# Patient Record
Sex: MALE | Race: WHITE | NOT HISPANIC OR LATINO | Employment: OTHER | URBAN - METROPOLITAN AREA
[De-identification: names, ages, dates, MRNs, and addresses within clinical notes are randomized per-mention and may not be internally consistent; named-entity substitution may affect disease eponyms.]

---

## 2017-09-07 ENCOUNTER — OFFICE VISIT (OUTPATIENT)
Dept: FAMILY MEDICINE CLINIC | Facility: CLINIC | Age: 67
End: 2017-09-07

## 2017-09-07 VITALS
SYSTOLIC BLOOD PRESSURE: 138 MMHG | HEIGHT: 69 IN | OXYGEN SATURATION: 98 % | RESPIRATION RATE: 14 BRPM | TEMPERATURE: 98.3 F | DIASTOLIC BLOOD PRESSURE: 88 MMHG | WEIGHT: 185.2 LBS | BODY MASS INDEX: 27.43 KG/M2 | HEART RATE: 79 BPM

## 2017-09-07 DIAGNOSIS — E78.5 HYPERLIPIDEMIA, UNSPECIFIED HYPERLIPIDEMIA TYPE: ICD-10-CM

## 2017-09-07 DIAGNOSIS — N40.1 BENIGN NON-NODULAR PROSTATIC HYPERPLASIA WITH LOWER URINARY TRACT SYMPTOMS: ICD-10-CM

## 2017-09-07 DIAGNOSIS — I10 ESSENTIAL HYPERTENSION: Primary | ICD-10-CM

## 2017-09-07 LAB
ALBUMIN SERPL-MCNC: 4.6 G/DL (ref 3.2–4.8)
ALBUMIN/GLOB SERPL: 1.6 G/DL (ref 1.5–2.5)
ALP SERPL-CCNC: 93 U/L (ref 25–100)
ALT SERPL-CCNC: 20 U/L (ref 7–40)
AST SERPL-CCNC: 20 U/L (ref 0–33)
BILIRUB SERPL-MCNC: 0.8 MG/DL (ref 0.3–1.2)
BUN SERPL-MCNC: 18 MG/DL (ref 9–23)
BUN/CREAT SERPL: 18 (ref 7–25)
CALCIUM SERPL-MCNC: 10.1 MG/DL (ref 8.7–10.4)
CHLORIDE SERPL-SCNC: 100 MMOL/L (ref 99–109)
CHOLEST SERPL-MCNC: 167 MG/DL (ref 0–200)
CO2 SERPL-SCNC: 29 MMOL/L (ref 20–31)
CREAT SERPL-MCNC: 1 MG/DL (ref 0.6–1.3)
GLOBULIN SER CALC-MCNC: 2.9 GM/DL
GLUCOSE SERPL-MCNC: 102 MG/DL (ref 70–100)
HDLC SERPL-MCNC: 40 MG/DL (ref 40–60)
LDLC SERPL CALC-MCNC: 83 MG/DL (ref 0–100)
POTASSIUM SERPL-SCNC: 4 MMOL/L (ref 3.5–5.5)
PROT SERPL-MCNC: 7.5 G/DL (ref 5.7–8.2)
PSA SERPL-MCNC: 0.84 NG/ML (ref 0–4)
SODIUM SERPL-SCNC: 137 MMOL/L (ref 132–146)
TRIGL SERPL-MCNC: 220 MG/DL (ref 0–150)
VLDLC SERPL CALC-MCNC: 44 MG/DL

## 2017-09-07 PROCEDURE — 99213 OFFICE O/P EST LOW 20 MIN: CPT | Performed by: PHYSICIAN ASSISTANT

## 2017-09-07 RX ORDER — QUINAPRIL 40 MG/1
40 TABLET ORAL DAILY
Qty: 30 TABLET | Refills: 11 | Status: SHIPPED | OUTPATIENT
Start: 2017-09-07 | End: 2017-10-05 | Stop reason: SDUPTHER

## 2017-09-07 RX ORDER — SIMVASTATIN 40 MG
40 TABLET ORAL NIGHTLY
Qty: 30 TABLET | Refills: 11 | Status: SHIPPED | OUTPATIENT
Start: 2017-09-07 | End: 2018-08-20 | Stop reason: SDUPTHER

## 2017-09-07 RX ORDER — HYDROCHLOROTHIAZIDE 25 MG/1
25 TABLET ORAL DAILY
Qty: 30 TABLET | Refills: 11 | Status: SHIPPED | OUTPATIENT
Start: 2017-09-07 | End: 2018-09-17 | Stop reason: SDUPTHER

## 2017-09-07 RX ORDER — ASPIRIN 81 MG/1
81 TABLET ORAL DAILY
COMMUNITY
End: 2019-10-07

## 2017-09-07 RX ORDER — FINASTERIDE 5 MG/1
5 TABLET, FILM COATED ORAL DAILY
Qty: 30 TABLET | Refills: 11 | Status: SHIPPED | OUTPATIENT
Start: 2017-09-07 | End: 2018-09-17 | Stop reason: SDUPTHER

## 2017-09-07 NOTE — PROGRESS NOTES
Subjective   Rhett sIaacs is a 67 y.o. male    History of Present Illness  Patient 67-year-old white male comes in follow-up hypertension doing well no problems or complaints, taking medication as prescribed blood pressure running 140/80.    Patient comes in follow-up of BPH with obstruction symptoms takes Proscar everyday symptoms are controlled medication no dysuria and increased frequency no burning upon urination    Patient comes in follow-up hyperlipidemia takes simvastatin everyday has been trying to watch his diet and exercise no problem or complaints on medication      The following portions of the patient's history were reviewed and updated as appropriate: allergies, current medications, past social history and problem list    Review of Systems   Constitutional: Negative for appetite change, diaphoresis, fatigue and unexpected weight change.   Eyes: Negative for visual disturbance.   Respiratory: Negative for cough, chest tightness and shortness of breath.    Cardiovascular: Negative for chest pain, palpitations and leg swelling.   Gastrointestinal: Negative for diarrhea, nausea and vomiting.   Endocrine: Negative for polydipsia, polyphagia and polyuria.   Skin: Negative for color change and rash.   Neurological: Negative for dizziness, syncope, weakness, light-headedness, numbness and headaches.       Objective     Vitals:    09/07/17 0836   BP: 138/88   Pulse: 79   Resp: 14   Temp: 98.3 °F (36.8 °C)   SpO2: 98%       Physical Exam   Constitutional: He appears well-developed and well-nourished.   Neck: Neck supple. No JVD present. No thyromegaly present.   Cardiovascular: Normal rate, regular rhythm, normal heart sounds, intact distal pulses and normal pulses.    No murmur heard.  Pulmonary/Chest: Effort normal and breath sounds normal. No respiratory distress.   Abdominal: Soft. Bowel sounds are normal. There is no hepatosplenomegaly. There is no tenderness.   Musculoskeletal: He exhibits no edema.    Lymphadenopathy:     He has no cervical adenopathy.   Neurological: No sensory deficit.   Skin: Skin is warm and dry. He is not diaphoretic.   Nursing note and vitals reviewed.      Assessment/Plan     Diagnoses and all orders for this visit:    Essential hypertension  -     quinapril (ACCUPRIL) 40 MG tablet; Take 1 tablet by mouth Daily.  -     hydrochlorothiazide (HYDRODIURIL) 25 MG tablet; Take 1 tablet by mouth Daily.  -     Comprehensive Metabolic Panel    Hyperlipidemia, unspecified hyperlipidemia type  -     simvastatin (ZOCOR) 40 MG tablet; Take 1 tablet by mouth Every Night.  -     Lipid Panel    Benign non-nodular prostatic hyperplasia with lower urinary tract symptoms  -     finasteride (PROSCAR) 5 MG tablet; Take 1 tablet by mouth Daily.  -     PSA; Future    Other orders  -     aspirin 81 MG EC tablet; Take 81 mg by mouth Daily.

## 2017-09-12 ENCOUNTER — RESULTS ENCOUNTER (OUTPATIENT)
Dept: FAMILY MEDICINE CLINIC | Facility: CLINIC | Age: 67
End: 2017-09-12

## 2017-09-12 DIAGNOSIS — N40.1 BENIGN NON-NODULAR PROSTATIC HYPERPLASIA WITH LOWER URINARY TRACT SYMPTOMS: ICD-10-CM

## 2017-09-19 DIAGNOSIS — I10 ESSENTIAL HYPERTENSION: ICD-10-CM

## 2017-09-19 RX ORDER — METOPROLOL SUCCINATE 100 MG/1
TABLET, EXTENDED RELEASE ORAL
Qty: 30 TABLET | Refills: 11 | Status: CANCELLED | OUTPATIENT
Start: 2017-09-19

## 2017-09-21 ENCOUNTER — TELEPHONE (OUTPATIENT)
Dept: FAMILY MEDICINE CLINIC | Facility: CLINIC | Age: 67
End: 2017-09-21

## 2017-09-21 DIAGNOSIS — I10 ESSENTIAL HYPERTENSION: ICD-10-CM

## 2017-09-21 RX ORDER — METOPROLOL SUCCINATE 100 MG/1
100 TABLET, EXTENDED RELEASE ORAL DAILY
Qty: 30 TABLET | Refills: 11 | Status: SHIPPED | OUTPATIENT
Start: 2017-09-21 | End: 2018-09-17 | Stop reason: SDUPTHER

## 2017-09-21 NOTE — TELEPHONE ENCOUNTER
----- Message from Prerna Ny sent at 9/21/2017  9:22 AM EDT -----  Contact: PATIENT  MEDICATION WAS NOT CALLED IN WITH OTHERS LAST WEEK:    metoprolol succinate XL (TOPROL-XL) 100 MG 24 hr tablet 1 9/13/2016           Essential hypertension  - Primary        Pharmacy     [facility]/PHARMACY #[id number] - [address] [phone] [facility] [phone]

## 2017-09-22 DIAGNOSIS — N41.9 PROSTATITIS, UNSPECIFIED PROSTATITIS TYPE: ICD-10-CM

## 2017-09-22 DIAGNOSIS — I10 ESSENTIAL HYPERTENSION: ICD-10-CM

## 2017-09-22 RX ORDER — QUINAPRIL 40 MG/1
TABLET ORAL
Qty: 30 TABLET | Refills: 11 | Status: SHIPPED | OUTPATIENT
Start: 2017-09-22 | End: 2018-09-17 | Stop reason: SDUPTHER

## 2017-09-22 RX ORDER — FINASTERIDE 5 MG/1
TABLET, FILM COATED ORAL
Qty: 30 TABLET | Refills: 10 | Status: SHIPPED | OUTPATIENT
Start: 2017-09-22 | End: 2017-10-05 | Stop reason: SDUPTHER

## 2017-09-22 RX ORDER — HYDROCHLOROTHIAZIDE 25 MG/1
TABLET ORAL
Qty: 30 TABLET | Refills: 10 | Status: SHIPPED | OUTPATIENT
Start: 2017-09-22 | End: 2017-10-05 | Stop reason: SDUPTHER

## 2017-10-05 ENCOUNTER — OFFICE VISIT (OUTPATIENT)
Dept: FAMILY MEDICINE CLINIC | Facility: CLINIC | Age: 67
End: 2017-10-05

## 2017-10-05 ENCOUNTER — LAB REQUISITION (OUTPATIENT)
Dept: LAB | Facility: HOSPITAL | Age: 67
End: 2017-10-05

## 2017-10-05 VITALS
SYSTOLIC BLOOD PRESSURE: 130 MMHG | TEMPERATURE: 97.8 F | HEIGHT: 69 IN | BODY MASS INDEX: 27.34 KG/M2 | RESPIRATION RATE: 16 BRPM | DIASTOLIC BLOOD PRESSURE: 78 MMHG | OXYGEN SATURATION: 98 % | WEIGHT: 184.6 LBS | HEART RATE: 73 BPM

## 2017-10-05 DIAGNOSIS — Z00.00 MEDICARE ANNUAL WELLNESS VISIT, INITIAL: ICD-10-CM

## 2017-10-05 DIAGNOSIS — Z00.00 ROUTINE GENERAL MEDICAL EXAMINATION AT HEALTH CARE FACILITY: Primary | ICD-10-CM

## 2017-10-05 DIAGNOSIS — Z12.11 SCREEN FOR COLON CANCER: ICD-10-CM

## 2017-10-05 DIAGNOSIS — Z23 IMMUNIZATION DUE: ICD-10-CM

## 2017-10-05 DIAGNOSIS — Z00.00 ROUTINE GENERAL MEDICAL EXAMINATION AT A HEALTH CARE FACILITY: ICD-10-CM

## 2017-10-05 LAB
BILIRUB BLD-MCNC: NEGATIVE MG/DL
CLARITY, POC: CLEAR
COLOR UR: YELLOW
GLUCOSE UR STRIP-MCNC: NEGATIVE MG/DL
KETONES UR QL: NEGATIVE
LEUKOCYTE EST, POC: NEGATIVE
NITRITE UR-MCNC: NEGATIVE MG/ML
PH UR: 8 [PH] (ref 5–8)
PROT UR STRIP-MCNC: NEGATIVE MG/DL
RBC # UR STRIP: ABNORMAL /UL
SP GR UR: 1.01 (ref 1–1.03)
UROBILINOGEN UR QL: NORMAL

## 2017-10-05 PROCEDURE — 36415 COLL VENOUS BLD VENIPUNCTURE: CPT | Performed by: PHYSICIAN ASSISTANT

## 2017-10-05 PROCEDURE — 99397 PER PM REEVAL EST PAT 65+ YR: CPT | Performed by: PHYSICIAN ASSISTANT

## 2017-10-05 PROCEDURE — 93000 ELECTROCARDIOGRAM COMPLETE: CPT | Performed by: PHYSICIAN ASSISTANT

## 2017-10-05 PROCEDURE — 96160 PT-FOCUSED HLTH RISK ASSMT: CPT | Performed by: PHYSICIAN ASSISTANT

## 2017-10-05 PROCEDURE — G0439 PPPS, SUBSEQ VISIT: HCPCS | Performed by: PHYSICIAN ASSISTANT

## 2017-10-05 PROCEDURE — 81003 URINALYSIS AUTO W/O SCOPE: CPT | Performed by: PHYSICIAN ASSISTANT

## 2017-10-05 RX ORDER — MULTIVITAMIN WITH IRON
TABLET ORAL
COMMUNITY
End: 2022-10-05 | Stop reason: SDUPTHER

## 2017-10-05 NOTE — PROGRESS NOTES
QUICK REFERENCE INFORMATION:  The ABCs of the Annual Wellness Visit    Initial Medicare Wellness Visit    HEALTH RISK ASSESSMENT    1950    Recent Hospitalizations:  No hospitalization(s) within the last year..        Current Medical Providers:  Patient Care Team:  WINSTON Flores as PCP - General (Family Medicine)        Smoking Status:  History   Smoking Status   • Never Smoker   Smokeless Tobacco   • Never Used       Alcohol Consumption:  History   Alcohol Use No       Depression Screen:   PHQ-2/PHQ-9 Depression Screening 10/5/2017   Little interest or pleasure in doing things 0   Feeling down, depressed, or hopeless 0   Total Score 0       Health Habits and Functional and Cognitive Screening:  Functional & Cognitive Status 10/5/2017   Do you have difficulty preparing food and eating? No   Do you have difficulty bathing yourself? No   Do you have difficulty getting dressed? No   Do you have difficulty using the toilet? No   Do you have difficulty moving around from place to place? No   In the past year have you fallen or experienced a near fall? Yes   Do you need help using the phone?  No   Are you deaf or do you have serious difficulty hearing?  No   Do you need help with transportation? No   Do you need help shopping? No   Do you need help preparing meals?  No   Do you need help with housework?  No   Do you need help with laundry? No   Do you need help taking your medications? No   Do you need help managing money? Yes   Do you have difficulty concentrating, remembering or making decisions? No       Health Habits  Current Diet: Well Balanced Diet  Dental Exam: Up to date  Eye Exam: Up to date  Exercise (times per week): 2 times per week  Current Exercise Activities Include: Bicycling Outdoors (Walking)          Does the patient have evidence of cognitive impairment? Yes    Asiprin use counseling: Taking ASA appropriately as indicated      Recent Lab Results:    Visual Acuity:  No exam data  present    Age-appropriate Screening Schedule:  Refer to the list below for future screening recommendations based on patient's age, sex and/or medical conditions. Orders for these recommended tests are listed in the plan section. The patient has been provided with a written plan.    Health Maintenance   Topic Date Due   • TDAP/TD VACCINES (1 - Tdap) 04/02/1968   • COLONOSCOPY  09/13/2016   • ZOSTER VACCINE  09/13/2016   • INFLUENZA VACCINE  08/01/2017   • PNEUMOCOCCAL VACCINES (65+ LOW/MEDIUM RISK) (2 of 2 - PPSV23) 09/13/2017   • LIPID PANEL  09/07/2018        Subjective   History of Present Illness    Rhett Isaacs is a 67 y.o. male who presents for an Annual Wellness Visit.    The following portions of the patient's history were reviewed and updated as appropriate: allergies, past family history, past medical history, past social history, past surgical history and problem list.    Outpatient Medications Prior to Visit   Medication Sig Dispense Refill   • aspirin 81 MG EC tablet Take 81 mg by mouth Daily.     • finasteride (PROSCAR) 5 MG tablet Take 1 tablet by mouth Daily. 30 tablet 11   • hydrochlorothiazide (HYDRODIURIL) 25 MG tablet Take 1 tablet by mouth Daily. 30 tablet 11   • metoprolol succinate XL (TOPROL-XL) 100 MG 24 hr tablet Take 1 tablet by mouth Daily. 30 tablet 11   • quinapril (ACCUPRIL) 40 MG tablet TAKE 1 TABLET BY MOUTH DAILY. 30 tablet 11   • simvastatin (ZOCOR) 40 MG tablet Take 1 tablet by mouth Every Night. 30 tablet 11   • finasteride (PROSCAR) 5 MG tablet TAKE 1 TABLET BY MOUTH DAILY. 30 tablet 10   • hydrochlorothiazide (HYDRODIURIL) 25 MG tablet TAKE 1 TABLET BY MOUTH DAILY. 30 tablet 10   • quinapril (ACCUPRIL) 40 MG tablet Take 1 tablet by mouth Daily. 30 tablet 11     No facility-administered medications prior to visit.        Patient Active Problem List   Diagnosis   • Benign hypertrophy of prostate   • Mixed hyperlipidemia   • Essential hypertension, benign       Advance Care  Planning:  has an advance directive - a copy HAS NOT been provided. Have asked the patient to send this to us to add to record.    Identification of Risk Factors:  Risk factors include: weight , cardiovascular risk and inactivity.    Review of Systems   Constitutional: Negative.    HENT: Negative.    Eyes: Negative.    Respiratory: Negative.    Cardiovascular: Negative.    Gastrointestinal: Negative.    Endocrine: Negative.    Genitourinary: Negative.    Musculoskeletal: Negative.    Skin: Negative.    Allergic/Immunologic: Negative.    Neurological: Negative.    Hematological: Negative.    Psychiatric/Behavioral: Negative.    All other systems reviewed and are negative.      Compared to one year ago, the patient feels his physical health is better.  Compared to one year ago, the patient feels his mental health is better.    Objective     Physical Exam   Constitutional: He is oriented to person, place, and time. He appears well-developed and well-nourished.   HENT:   Head: Normocephalic and atraumatic.   Right Ear: External ear normal.   Left Ear: External ear normal.   Nose: Nose normal.   Mouth/Throat: Oropharynx is clear and moist.   Eyes: Conjunctivae and EOM are normal. Pupils are equal, round, and reactive to light.   Neck: Normal range of motion. Neck supple. No thyromegaly present.   Cardiovascular: Normal rate, regular rhythm, normal heart sounds and intact distal pulses.    No murmur heard.  Pulmonary/Chest: Effort normal and breath sounds normal.   Abdominal: Soft. Bowel sounds are normal. He exhibits no mass. There is no tenderness.   Genitourinary: Rectum normal, prostate normal and penis normal.   Musculoskeletal: Normal range of motion. He exhibits no edema.   Neurological: He is alert and oriented to person, place, and time. He has normal reflexes. No cranial nerve deficit.   Skin: Skin is warm and dry.   Psychiatric: He has a normal mood and affect.       ECG 12 Lead  Date/Time: 10/5/2017 8:42  "AM  Performed by: MASSIEL VIGIL  Authorized by: MASSIEL VIGIL   Rhythm: sinus rhythm  Rate: normal  ST Segments: ST segments normal  T Waves: T waves normal  QRS axis: normal  Clinical impression: normal ECG          Vitals:    10/05/17 0946   BP: 130/78   Pulse: 73   Resp: 16   Temp: 97.8 °F (36.6 °C)   TempSrc: Oral   SpO2: 98%   Weight: 184 lb 9.6 oz (83.7 kg)   Height: 69\" (175.3 cm)   PainSc: 0-No pain       Body mass index is 27.26 kg/(m^2).  Discussed the patient's BMI with him. The BMI is in the acceptable range.    Assessment/Plan   Patient Self-Management and Personalized Health Advice  The patient has been provided with information about: diet, exercise, weight management and fall prevention and preventive services including:   · Advance directive, Colorectal cancer screening, colonoscopy referral placed, Exercise counseling provided, Fall Risk assessment done.    Visit Diagnoses:    ICD-10-CM ICD-9-CM   1. Routine general medical examination at [facility] care facility Z00.00 V70.0       Orders Placed This Encounter   Procedures   • POCT urinalysis dipstick, automated       Outpatient Encounter Prescriptions as of 10/5/2017   Medication Sig Dispense Refill   • aspirin 81 MG EC tablet Take 81 mg by mouth Daily.     • finasteride (PROSCAR) 5 MG tablet Take 1 tablet by mouth Daily. 30 tablet 11   • hydrochlorothiazide (HYDRODIURIL) 25 MG tablet Take 1 tablet by mouth Daily. 30 tablet 11   • Magnesium 250 MG tablet Take  by mouth.     • metoprolol succinate XL (TOPROL-XL) 100 MG 24 hr tablet Take 1 tablet by mouth Daily. 30 tablet 11   • quinapril (ACCUPRIL) 40 MG tablet TAKE 1 TABLET BY MOUTH DAILY. 30 tablet 11   • simvastatin (ZOCOR) 40 MG tablet Take 1 tablet by mouth Every Night. 30 tablet 11   • [DISCONTINUED] finasteride (PROSCAR) 5 MG tablet TAKE 1 TABLET BY MOUTH DAILY. 30 tablet 10   • [DISCONTINUED] hydrochlorothiazide (HYDRODIURIL) 25 MG tablet TAKE 1 TABLET BY MOUTH DAILY. 30 " tablet 10   • [DISCONTINUED] quinapril (ACCUPRIL) 40 MG tablet Take 1 tablet by mouth Daily. 30 tablet 11     No facility-administered encounter medications on file as of 10/5/2017.      Reviewed use of high risk medication in the elderly: yes  Reviewed for potential of harmful drug interactions in the elderly: yes    Follow Up:  No Follow-up on file.     An After Visit Summary and PPPS with all of these plans were given to the patient.

## 2017-10-06 LAB — HCV AB S/CO SERPL IA: <0.1 S/CO RATIO (ref 0–0.9)

## 2018-04-12 ENCOUNTER — OFFICE VISIT (OUTPATIENT)
Dept: FAMILY MEDICINE CLINIC | Facility: CLINIC | Age: 68
End: 2018-04-12

## 2018-04-12 VITALS
OXYGEN SATURATION: 99 % | HEART RATE: 74 BPM | DIASTOLIC BLOOD PRESSURE: 80 MMHG | SYSTOLIC BLOOD PRESSURE: 134 MMHG | WEIGHT: 181 LBS | TEMPERATURE: 97.3 F | RESPIRATION RATE: 16 BRPM | BODY MASS INDEX: 26.81 KG/M2 | HEIGHT: 69 IN

## 2018-04-12 DIAGNOSIS — R31.9 HEMATURIA, UNSPECIFIED TYPE: Primary | ICD-10-CM

## 2018-04-12 LAB
BILIRUB BLD-MCNC: NEGATIVE MG/DL
CLARITY, POC: CLEAR
COLOR UR: ABNORMAL
GLUCOSE UR STRIP-MCNC: NEGATIVE MG/DL
KETONES UR QL: NEGATIVE
LEUKOCYTE EST, POC: NEGATIVE
NITRITE UR-MCNC: NEGATIVE MG/ML
PH UR: 7 [PH] (ref 5–8)
PROT UR STRIP-MCNC: NEGATIVE MG/DL
RBC # UR STRIP: ABNORMAL /UL
SP GR UR: 1 (ref 1–1.03)
UROBILINOGEN UR QL: NORMAL

## 2018-04-12 PROCEDURE — 99214 OFFICE O/P EST MOD 30 MIN: CPT | Performed by: PHYSICIAN ASSISTANT

## 2018-04-12 PROCEDURE — 81003 URINALYSIS AUTO W/O SCOPE: CPT | Performed by: PHYSICIAN ASSISTANT

## 2018-04-12 RX ORDER — CIPROFLOXACIN 500 MG/1
500 TABLET, FILM COATED ORAL 2 TIMES DAILY
Qty: 20 TABLET | Refills: 0 | Status: SHIPPED | OUTPATIENT
Start: 2018-04-12 | End: 2018-09-13

## 2018-04-12 NOTE — PROGRESS NOTES
Subjective   Rhett Isaacs is a 68 y.o. male  Blood in Urine (Had sharp pain when urinating several times last week with hematuria but has since resolved)      History of Present Illness  Patient is a 68-year-old white male comes in complaining of flank pain dysuria ×3 days he did pass some blood states she has some low back pain he does have a history of benign hematuria in the past.  Patient states she has burning sensation a small clot was passed      The following portions of the patient's history were reviewed and updated as appropriate: allergies, current medications, past social history and problem list    Review of Systems   Constitutional: Negative for fatigue and unexpected weight change.   Respiratory: Negative for cough, chest tightness and shortness of breath.    Cardiovascular: Negative for chest pain, palpitations and leg swelling.   Gastrointestinal: Negative for nausea.   Genitourinary: Positive for dysuria, frequency, hematuria and urgency.   Skin: Negative for color change and rash.   Neurological: Negative for dizziness, syncope, weakness and headaches.       Objective     Vitals:    04/12/18 1122   BP: 134/80   Pulse: 74   Resp: 16   Temp: 97.3 °F (36.3 °C)   SpO2: 99%       Physical Exam   Constitutional: He appears well-developed and well-nourished.   Neck: Neck supple. No JVD present. No thyromegaly present.   Cardiovascular: Normal rate, regular rhythm, normal heart sounds, intact distal pulses and normal pulses.    No murmur heard.  Pulmonary/Chest: Effort normal and breath sounds normal. No respiratory distress.   Abdominal: Soft. Bowel sounds are normal. There is no hepatosplenomegaly. There is no tenderness.   Musculoskeletal: He exhibits no edema.   Lymphadenopathy:     He has no cervical adenopathy.   Neurological: No sensory deficit.   Skin: Skin is warm and dry. He is not diaphoretic.   Nursing note and vitals reviewed.      Assessment/Plan     Diagnoses and all orders for this  visit:    Hematuria, unspecified type  -     POCT urinalysis dipstick, automated  -     ciprofloxacin (CIPRO) 500 MG tablet; Take 1 tablet by mouth 2 (Two) Times a Day.    Follow-up as needed

## 2018-08-18 DIAGNOSIS — N41.9 PROSTATITIS, UNSPECIFIED PROSTATITIS TYPE: ICD-10-CM

## 2018-08-18 DIAGNOSIS — I10 ESSENTIAL HYPERTENSION: ICD-10-CM

## 2018-08-20 DIAGNOSIS — R31.9 HEMATURIA, UNSPECIFIED TYPE: ICD-10-CM

## 2018-08-20 DIAGNOSIS — E78.5 HYPERLIPIDEMIA, UNSPECIFIED HYPERLIPIDEMIA TYPE: ICD-10-CM

## 2018-08-20 RX ORDER — CIPROFLOXACIN 500 MG/1
500 TABLET, FILM COATED ORAL 2 TIMES DAILY
Qty: 20 TABLET | Refills: 0 | OUTPATIENT
Start: 2018-08-20

## 2018-08-20 RX ORDER — HYDROCHLOROTHIAZIDE 25 MG/1
TABLET ORAL
Qty: 30 TABLET | Refills: 2 | Status: SHIPPED | OUTPATIENT
Start: 2018-08-20 | End: 2018-09-17 | Stop reason: SDUPTHER

## 2018-08-20 RX ORDER — FINASTERIDE 5 MG/1
TABLET, FILM COATED ORAL
Qty: 30 TABLET | Refills: 2 | Status: SHIPPED | OUTPATIENT
Start: 2018-08-20 | End: 2018-09-17 | Stop reason: SDUPTHER

## 2018-08-20 RX ORDER — SIMVASTATIN 40 MG
40 TABLET ORAL NIGHTLY
Qty: 30 TABLET | Refills: 2 | Status: SHIPPED | OUTPATIENT
Start: 2018-08-20 | End: 2018-09-17 | Stop reason: SDUPTHER

## 2018-09-13 ENCOUNTER — OFFICE VISIT (OUTPATIENT)
Dept: FAMILY MEDICINE CLINIC | Facility: CLINIC | Age: 68
End: 2018-09-13

## 2018-09-13 ENCOUNTER — LAB REQUISITION (OUTPATIENT)
Dept: LAB | Facility: HOSPITAL | Age: 68
End: 2018-09-13

## 2018-09-13 VITALS
SYSTOLIC BLOOD PRESSURE: 136 MMHG | HEIGHT: 69 IN | OXYGEN SATURATION: 98 % | BODY MASS INDEX: 26.25 KG/M2 | RESPIRATION RATE: 14 BRPM | HEART RATE: 70 BPM | WEIGHT: 177.2 LBS | DIASTOLIC BLOOD PRESSURE: 84 MMHG

## 2018-09-13 DIAGNOSIS — Z00.00 ROUTINE GENERAL MEDICAL EXAMINATION AT A HEALTH CARE FACILITY: ICD-10-CM

## 2018-09-13 DIAGNOSIS — I10 ESSENTIAL HYPERTENSION, BENIGN: Primary | ICD-10-CM

## 2018-09-13 DIAGNOSIS — N40.1 BPH WITH OBSTRUCTION/LOWER URINARY TRACT SYMPTOMS: ICD-10-CM

## 2018-09-13 DIAGNOSIS — N13.8 BPH WITH OBSTRUCTION/LOWER URINARY TRACT SYMPTOMS: ICD-10-CM

## 2018-09-13 DIAGNOSIS — E78.2 MIXED HYPERLIPIDEMIA: ICD-10-CM

## 2018-09-13 LAB
ALBUMIN SERPL-MCNC: 4.62 G/DL (ref 3.2–4.8)
ALBUMIN/GLOB SERPL: 2 G/DL (ref 1.5–2.5)
ALP SERPL-CCNC: 87 U/L (ref 25–100)
ALT SERPL-CCNC: 20 U/L (ref 7–40)
AST SERPL-CCNC: 20 U/L (ref 0–33)
BASOPHILS # BLD AUTO: 0.02 10*3/MM3 (ref 0–0.2)
BASOPHILS NFR BLD AUTO: 0.2 % (ref 0–1)
BILIRUB SERPL-MCNC: 0.9 MG/DL (ref 0.3–1.2)
BUN SERPL-MCNC: 21 MG/DL (ref 9–23)
BUN/CREAT SERPL: 19.6 (ref 7–25)
CALCIUM SERPL-MCNC: 9.7 MG/DL (ref 8.7–10.4)
CHLORIDE SERPL-SCNC: 102 MMOL/L (ref 99–109)
CHOLEST SERPL-MCNC: 138 MG/DL (ref 0–200)
CO2 SERPL-SCNC: 29 MMOL/L (ref 20–31)
CREAT SERPL-MCNC: 1.07 MG/DL (ref 0.6–1.3)
EOSINOPHIL # BLD AUTO: 0.08 10*3/MM3 (ref 0–0.3)
EOSINOPHIL NFR BLD AUTO: 0.9 % (ref 0–3)
ERYTHROCYTE [DISTWIDTH] IN BLOOD BY AUTOMATED COUNT: 14.1 % (ref 11.3–14.5)
GLOBULIN SER CALC-MCNC: 2.3 GM/DL
GLUCOSE SERPL-MCNC: 102 MG/DL (ref 70–100)
HCT VFR BLD AUTO: 44.8 % (ref 38.9–50.9)
HDLC SERPL-MCNC: 43 MG/DL (ref 40–60)
HGB BLD-MCNC: 14.6 G/DL (ref 13.1–17.5)
IMM GRANULOCYTES # BLD: 0.04 10*3/MM3 (ref 0–0.03)
IMM GRANULOCYTES NFR BLD: 0.5 % (ref 0–0.6)
LDLC SERPL CALC-MCNC: 66 MG/DL (ref 0–100)
LYMPHOCYTES # BLD AUTO: 1.85 10*3/MM3 (ref 0.6–4.8)
LYMPHOCYTES NFR BLD AUTO: 21 % (ref 24–44)
MCH RBC QN AUTO: 29 PG (ref 27–31)
MCHC RBC AUTO-ENTMCNC: 32.6 G/DL (ref 32–36)
MCV RBC AUTO: 88.9 FL (ref 80–99)
MONOCYTES # BLD AUTO: 0.79 10*3/MM3 (ref 0–1)
MONOCYTES NFR BLD AUTO: 9 % (ref 0–12)
NEUTROPHILS # BLD AUTO: 6.03 10*3/MM3 (ref 1.5–8.3)
NEUTROPHILS NFR BLD AUTO: 68.4 % (ref 41–71)
PLATELET # BLD AUTO: 245 10*3/MM3 (ref 150–450)
POTASSIUM SERPL-SCNC: 4.4 MMOL/L (ref 3.5–5.5)
PROT SERPL-MCNC: 6.9 G/DL (ref 5.7–8.2)
PSA SERPL-MCNC: 1.05 NG/ML (ref 0–4)
RBC # BLD AUTO: 5.04 10*6/MM3 (ref 4.2–5.76)
SODIUM SERPL-SCNC: 138 MMOL/L (ref 132–146)
TRIGL SERPL-MCNC: 143 MG/DL (ref 0–150)
TSH SERPL DL<=0.005 MIU/L-ACNC: 0.95 MIU/ML (ref 0.35–5.35)
VLDLC SERPL CALC-MCNC: 28.6 MG/DL
WBC # BLD AUTO: 8.81 10*3/MM3 (ref 3.5–10.8)

## 2018-09-13 PROCEDURE — 36415 COLL VENOUS BLD VENIPUNCTURE: CPT | Performed by: PHYSICIAN ASSISTANT

## 2018-09-13 PROCEDURE — 99213 OFFICE O/P EST LOW 20 MIN: CPT | Performed by: PHYSICIAN ASSISTANT

## 2018-09-14 NOTE — PROGRESS NOTES
Subjective   Rhett Isaacs is a 68 y.o. male  Hypertension (RF metoprolol, HCTZ, quinapril); Hyperlipidemia (RF simvastatin. Fasting for labs.); and Benign Prostatic Hypertrophy (RF finasteride)      History of Present Illness  Patient 68-year-old white male who comes in complaining of fevers breath chest pain no problems or complaints meds working well needs refill of blood pressure medication labs, patient also has history of hyperlipidemia which is controlled with diet and exercise patient would like to try something other than Flomax for BPH symptoms and evidence increased frequency trouble stopping starting  The following portions of the patient's history were reviewed and updated as appropriate: allergies, current medications, past social history and problem list    Review of Systems   Constitutional: Negative for appetite change, diaphoresis, fatigue and unexpected weight change.   Eyes: Negative for visual disturbance.   Respiratory: Negative for cough, chest tightness and shortness of breath.    Cardiovascular: Negative for chest pain, palpitations and leg swelling.   Gastrointestinal: Negative for diarrhea, nausea and vomiting.   Endocrine: Negative for polydipsia, polyphagia and polyuria.   Skin: Negative for color change and rash.   Neurological: Negative for dizziness, syncope, weakness, light-headedness, numbness and headaches.       Objective     Vitals:    09/13/18 0950   BP: 136/84   Pulse: 70   Resp: 14   SpO2: 98%       Physical Exam   Constitutional: He appears well-developed and well-nourished.   Neck: Neck supple. No JVD present. No thyromegaly present.   Cardiovascular: Normal rate, regular rhythm, normal heart sounds, intact distal pulses and normal pulses.    No murmur heard.  Pulmonary/Chest: Effort normal and breath sounds normal. No respiratory distress.   Abdominal: Soft. Bowel sounds are normal. There is no hepatosplenomegaly. There is no tenderness.   Musculoskeletal: He exhibits  no edema.   Lymphadenopathy:     He has no cervical adenopathy.   Neurological: No sensory deficit.   Skin: Skin is warm and dry. He is not diaphoretic.   Nursing note and vitals reviewed.      Assessment/Plan     Diagnoses and all orders for this visit:    Essential hypertension, benign  -     Comprehensive Metabolic Panel; Future  -     Lipid Panel; Future  -     TSH; Future  -     CBC & Differential; Future  -     Comprehensive Metabolic Panel  -     TSH  -     CBC & Differential    Mixed hyperlipidemia  -     Lipid Panel    BPH with obstruction/lower urinary tract symptoms  -     Cancel: PSA DIAGNOSTIC; Future  -     PSA DIAGNOSTIC

## 2018-09-17 ENCOUNTER — TELEPHONE (OUTPATIENT)
Dept: FAMILY MEDICINE CLINIC | Facility: CLINIC | Age: 68
End: 2018-09-17

## 2018-09-17 DIAGNOSIS — I10 ESSENTIAL HYPERTENSION: ICD-10-CM

## 2018-09-17 DIAGNOSIS — E78.5 HYPERLIPIDEMIA, UNSPECIFIED HYPERLIPIDEMIA TYPE: ICD-10-CM

## 2018-09-17 DIAGNOSIS — N40.1 BENIGN NON-NODULAR PROSTATIC HYPERPLASIA WITH LOWER URINARY TRACT SYMPTOMS: ICD-10-CM

## 2018-09-17 RX ORDER — SIMVASTATIN 40 MG
40 TABLET ORAL NIGHTLY
Qty: 30 TABLET | Refills: 11 | Status: SHIPPED | OUTPATIENT
Start: 2018-09-17 | End: 2019-10-07 | Stop reason: SDUPTHER

## 2018-09-17 RX ORDER — METOPROLOL SUCCINATE 100 MG/1
100 TABLET, EXTENDED RELEASE ORAL DAILY
Qty: 30 TABLET | Refills: 11 | Status: SHIPPED | OUTPATIENT
Start: 2018-09-17 | End: 2019-09-03 | Stop reason: SDUPTHER

## 2018-09-17 RX ORDER — FINASTERIDE 5 MG/1
5 TABLET, FILM COATED ORAL DAILY
Qty: 30 TABLET | Refills: 11 | Status: SHIPPED | OUTPATIENT
Start: 2018-09-17 | End: 2019-10-07 | Stop reason: SDUPTHER

## 2018-09-17 RX ORDER — QUINAPRIL 40 MG/1
40 TABLET ORAL DAILY
Qty: 30 TABLET | Refills: 11 | Status: SHIPPED | OUTPATIENT
Start: 2018-09-17 | End: 2019-09-03 | Stop reason: SDUPTHER

## 2018-09-17 RX ORDER — HYDROCHLOROTHIAZIDE 25 MG/1
25 TABLET ORAL DAILY
Qty: 30 TABLET | Refills: 11 | Status: SHIPPED | OUTPATIENT
Start: 2018-09-17 | End: 2019-10-07 | Stop reason: SDUPTHER

## 2018-09-17 NOTE — TELEPHONE ENCOUNTER
----- Message from Maritza Carrion sent at 9/17/2018  8:33 AM EDT -----  Contact: PT  SAW NICK Thursday, WAS TO RENEW ALL MEDS. [facility] DOESN'T HAVE ANYTHING.     finasteride (PROSCAR) 5 MG tablet  hydrochlorothiazide (HYDRODIURIL) 25 MG tablet  metoprolol succinate XL (TOPROL-XL) 100 MG 24 hr tablet  quinapril (ACCUPRIL) 40 MG tablet  simvastatin (ZOCOR) 40 MG tablet    [facility]/pharmacy #[id number] - [address] - [phone]  - [phone] (Phone)  [phone] (Fax)

## 2018-09-19 DIAGNOSIS — I10 ESSENTIAL HYPERTENSION: ICD-10-CM

## 2018-09-20 RX ORDER — METOPROLOL SUCCINATE 100 MG/1
100 TABLET, EXTENDED RELEASE ORAL DAILY
Qty: 30 TABLET | Refills: 11 | Status: SHIPPED | OUTPATIENT
Start: 2018-09-20 | End: 2019-09-03 | Stop reason: SDUPTHER

## 2019-09-03 DIAGNOSIS — I10 ESSENTIAL HYPERTENSION: ICD-10-CM

## 2019-09-03 RX ORDER — QUINAPRIL 40 MG/1
40 TABLET ORAL DAILY
Qty: 30 TABLET | Refills: 0 | Status: SHIPPED | OUTPATIENT
Start: 2019-09-03 | End: 2019-09-18 | Stop reason: SDUPTHER

## 2019-09-03 RX ORDER — METOPROLOL SUCCINATE 100 MG/1
100 TABLET, EXTENDED RELEASE ORAL DAILY
Qty: 30 TABLET | Refills: 0 | Status: SHIPPED | OUTPATIENT
Start: 2019-09-03 | End: 2019-10-07 | Stop reason: SDUPTHER

## 2019-09-15 DIAGNOSIS — I10 ESSENTIAL HYPERTENSION: ICD-10-CM

## 2019-09-18 RX ORDER — QUINAPRIL 40 MG/1
TABLET ORAL
Qty: 30 TABLET | Refills: 11 | Status: SHIPPED | OUTPATIENT
Start: 2019-09-18 | End: 2019-10-07 | Stop reason: SDUPTHER

## 2019-10-07 ENCOUNTER — OFFICE VISIT (OUTPATIENT)
Dept: FAMILY MEDICINE CLINIC | Facility: CLINIC | Age: 69
End: 2019-10-07

## 2019-10-07 ENCOUNTER — LAB REQUISITION (OUTPATIENT)
Dept: LAB | Facility: HOSPITAL | Age: 69
End: 2019-10-07

## 2019-10-07 VITALS
WEIGHT: 180.2 LBS | HEIGHT: 69 IN | DIASTOLIC BLOOD PRESSURE: 82 MMHG | TEMPERATURE: 98 F | HEART RATE: 65 BPM | RESPIRATION RATE: 16 BRPM | BODY MASS INDEX: 26.69 KG/M2 | SYSTOLIC BLOOD PRESSURE: 132 MMHG | OXYGEN SATURATION: 98 %

## 2019-10-07 DIAGNOSIS — Z00.00 MEDICARE ANNUAL WELLNESS VISIT, SUBSEQUENT: ICD-10-CM

## 2019-10-07 DIAGNOSIS — N40.1 BENIGN NON-NODULAR PROSTATIC HYPERPLASIA WITH LOWER URINARY TRACT SYMPTOMS: ICD-10-CM

## 2019-10-07 DIAGNOSIS — I10 ESSENTIAL HYPERTENSION: ICD-10-CM

## 2019-10-07 DIAGNOSIS — Z00.00 ROUTINE GENERAL MEDICAL EXAMINATION AT A HEALTH CARE FACILITY: Primary | ICD-10-CM

## 2019-10-07 DIAGNOSIS — Z00.00 ROUTINE GENERAL MEDICAL EXAMINATION AT A HEALTH CARE FACILITY: ICD-10-CM

## 2019-10-07 DIAGNOSIS — E78.5 HYPERLIPIDEMIA, UNSPECIFIED HYPERLIPIDEMIA TYPE: ICD-10-CM

## 2019-10-07 LAB
BILIRUB BLD-MCNC: NEGATIVE MG/DL
CLARITY, POC: CLEAR
COLOR UR: YELLOW
GLUCOSE UR STRIP-MCNC: NEGATIVE MG/DL
KETONES UR QL: NEGATIVE
LEUKOCYTE EST, POC: NEGATIVE
NITRITE UR-MCNC: NEGATIVE MG/ML
PH UR: 6.5 [PH] (ref 5–8)
PROT UR STRIP-MCNC: NEGATIVE MG/DL
RBC # UR STRIP: ABNORMAL /UL
SP GR UR: 1.01 (ref 1–1.03)
UROBILINOGEN UR QL: NORMAL

## 2019-10-07 PROCEDURE — 36415 COLL VENOUS BLD VENIPUNCTURE: CPT | Performed by: PHYSICIAN ASSISTANT

## 2019-10-07 PROCEDURE — 99397 PER PM REEVAL EST PAT 65+ YR: CPT | Performed by: PHYSICIAN ASSISTANT

## 2019-10-07 PROCEDURE — 96160 PT-FOCUSED HLTH RISK ASSMT: CPT | Performed by: PHYSICIAN ASSISTANT

## 2019-10-07 PROCEDURE — G0439 PPPS, SUBSEQ VISIT: HCPCS | Performed by: PHYSICIAN ASSISTANT

## 2019-10-07 PROCEDURE — 93000 ELECTROCARDIOGRAM COMPLETE: CPT | Performed by: PHYSICIAN ASSISTANT

## 2019-10-07 PROCEDURE — 81003 URINALYSIS AUTO W/O SCOPE: CPT | Performed by: PHYSICIAN ASSISTANT

## 2019-10-07 RX ORDER — QUINAPRIL 40 MG/1
40 TABLET ORAL DAILY
Qty: 90 TABLET | Refills: 3 | Status: SHIPPED | OUTPATIENT
Start: 2019-10-07 | End: 2020-09-23 | Stop reason: SDUPTHER

## 2019-10-07 RX ORDER — SIMVASTATIN 40 MG
40 TABLET ORAL NIGHTLY
Qty: 90 TABLET | Refills: 3 | Status: SHIPPED | OUTPATIENT
Start: 2019-10-07 | End: 2020-09-23 | Stop reason: SDUPTHER

## 2019-10-07 RX ORDER — METOPROLOL SUCCINATE 100 MG/1
100 TABLET, EXTENDED RELEASE ORAL DAILY
Qty: 90 TABLET | Refills: 3 | Status: SHIPPED | OUTPATIENT
Start: 2019-10-07 | End: 2020-09-23 | Stop reason: SDUPTHER

## 2019-10-07 RX ORDER — HYDROCHLOROTHIAZIDE 25 MG/1
25 TABLET ORAL DAILY
Qty: 90 TABLET | Refills: 3 | Status: SHIPPED | OUTPATIENT
Start: 2019-10-07 | End: 2020-09-23 | Stop reason: SDUPTHER

## 2019-10-07 RX ORDER — FINASTERIDE 5 MG/1
5 TABLET, FILM COATED ORAL DAILY
Qty: 90 TABLET | Refills: 3 | Status: SHIPPED | OUTPATIENT
Start: 2019-10-07 | End: 2020-09-23 | Stop reason: SDUPTHER

## 2019-10-07 NOTE — PROGRESS NOTES
The ABCs of the Annual Wellness Visit  Subsequent Medicare Wellness Visit    Chief Complaint   Patient presents with   • Annual Exam     Subsequent Medicare Wellness. UTD on colonoscopy. Will get flu and [id number] at pharmacy.       Subjective   History of Present Illness:  Rhett Isaacs is a 69 y.o. male who presents for a Subsequent Medicare Wellness Visit.    HEALTH RISK ASSESSMENT    Recent Hospitalizations:  No hospitalization(s) within the last year.    Current Medical Providers:  Patient Care Team:  Micky Archibald PA as PCP - General (Family Medicine)    Smoking Status:  Social History     Tobacco Use   Smoking Status Never Smoker   Smokeless Tobacco Never Used       Alcohol Consumption:  Social History     Substance and Sexual Activity   Alcohol Use No       Depression Screen:   PHQ-2/PHQ-9 Depression Screening 10/7/2019   Little interest or pleasure in doing things 0   Feeling down, depressed, or hopeless 0   Total Score 0       Fall Risk Screen:  JANI Fall Risk Assessment was completed, and patient is at LOW risk for falls.Assessment completed on:10/7/2019    Health Habits and Functional and Cognitive Screening:  Functional & Cognitive Status 10/7/2019   Do you have difficulty preparing food and eating? No   Do you have difficulty bathing yourself, getting dressed or grooming yourself? No   Do you have difficulty using the toilet? No   Do you have difficulty moving around from place to place? No   Do you have trouble with steps or getting out of a bed or a chair? No   Current Diet Well Balanced Diet   Dental Exam Not up to date   Eye Exam Not up to date   Exercise (times per week) 0 times per week   Current Exercise Activities Include None   Do you need help using the phone?  No   Are you deaf or do you have serious difficulty hearing?  No   Do you need help with transportation? No   Do you need help shopping? No   Do you need help preparing meals?  No   Do you need help with housework?   No   Do you need help with laundry? No   Do you need help taking your medications? No   Do you need help managing money? No   Do you ever drive or ride in a car without wearing a seat belt? No   Have you felt unusual stress, anger or loneliness in the last month? No   Who do you live with? Spouse   If you need help, do you have trouble finding someone available to you? No   Have you been bothered in the last four weeks by sexual problems? No   Do you have difficulty concentrating, remembering or making decisions? No         Does the patient have evidence of cognitive impairment? no  Asprin use counseling:Taking ASA appropriately as indicated    Age-appropriate Screening Schedule:  Refer to the list below for future screening recommendations based on patient's age, sex and/or medical conditions. Orders for these recommended tests are listed in the plan section. The patient has been provided with a written plan.    Health Maintenance   Topic Date Due   • INFLUENZA VACCINE  11/01/2019 (Originally 8/1/2019)   • ZOSTER VACCINE (2 of 2) 11/07/2019   • LIPID PANEL  10/07/2020   • TDAP/TD VACCINES (2 - Td) 10/01/2022   • COLONOSCOPY  10/23/2027   • PNEUMOCOCCAL VACCINES (65+ LOW/MEDIUM RISK)  Completed          The following portions of the patient's history were reviewed and updated as appropriate: allergies, current medications, past family history, past medical history, past social history, past surgical history and problem list.    Outpatient Medications Prior to Visit   Medication Sig Dispense Refill   • Magnesium 250 MG tablet Take  by mouth.     • aspirin 81 MG EC tablet Take 81 mg by mouth Daily.     • finasteride (PROSCAR) 5 MG tablet Take 1 tablet by mouth Daily. 30 tablet 11   • hydrochlorothiazide (HYDRODIURIL) 25 MG tablet Take 1 tablet by mouth Daily. 30 tablet 11   • metoprolol succinate XL (TOPROL-XL) 100 MG 24 hr tablet Take 1 tablet by mouth Daily. 30 tablet 0   • quinapril (ACCUPRIL) 40 MG tablet TAKE 1  "TABLET BY MOUTH EVERY DAY 30 tablet 11   • simvastatin (ZOCOR) 40 MG tablet Take 1 tablet by mouth Every Night. 30 tablet 11     No facility-administered medications prior to visit.        Patient Active Problem List   Diagnosis   • Benign hypertrophy of prostate   • Mixed hyperlipidemia   • Essential hypertension, benign       Advanced Care Planning:  Patient does not have an advance directive - information provided to the patient today    Review of Systems   Constitutional: Negative.    HENT: Negative.    Eyes: Negative.    Respiratory: Negative.    Cardiovascular: Negative.    Gastrointestinal: Negative.    Endocrine: Negative.    Genitourinary: Negative.    Musculoskeletal: Negative.    Skin: Negative.    Allergic/Immunologic: Negative.    Neurological: Negative.    Hematological: Negative.    Psychiatric/Behavioral: Negative.    All other systems reviewed and are negative.      Compared to one year ago, the patient feels his physical health is better.  Compared to one year ago, the patient feels his mental health is better.    Reviewed chart for potential of high risk medication in the elderly: yes  Reviewed chart for potential of harmful drug interactions in the elderly:yes    Objective         Vitals:    10/07/19 0956   BP: 132/82   Pulse: 65   Resp: 16   Temp: 98 °F (36.7 °C)   TempSrc: Oral   SpO2: 98%   Weight: 81.7 kg (180 lb 3.2 oz)   Height: 175.3 cm (69\")   PainSc: 0-No pain       Body mass index is 26.61 kg/m².  Discussed the patient's BMI with him. The BMI is in the acceptable range.    Physical Exam   Constitutional: He is oriented to person, place, and time. He appears well-developed and well-nourished.   HENT:   Head: Normocephalic and atraumatic.   Right Ear: External ear normal.   Left Ear: External ear normal.   Nose: Nose normal.   Mouth/Throat: Oropharynx is clear and moist.   Eyes: Conjunctivae and EOM are normal. Pupils are equal, round, and reactive to light.   Neck: Normal range of " motion. Neck supple. No thyromegaly present.   Cardiovascular: Normal rate, regular rhythm, normal heart sounds and intact distal pulses.   No murmur heard.  Pulmonary/Chest: Effort normal and breath sounds normal.   Abdominal: Soft. Bowel sounds are normal. He exhibits no mass. There is no tenderness.   Genitourinary: Rectum normal, prostate normal and penis normal.   Musculoskeletal: Normal range of motion. He exhibits no edema.   Neurological: He is alert and oriented to person, place, and time. He has normal reflexes. No cranial nerve deficit.   Skin: Skin is warm and dry.   Psychiatric: He has a normal mood and affect.     ECG 12 Lead  Date/Time: 10/7/2019 10:38 AM  Performed by: Micky Archibald PA  Authorized by: Micky Archibald PA   Comparison: not compared with previous ECG   Rhythm: sinus rhythm  Rate: normal  Conduction: conduction normal  ST Segments: ST segments normal  T Waves: T waves normal    Clinical impression: normal ECG                  Assessment/Plan   Medicare Risks and Personalized Health Plan  CMS Preventative Services Quick Reference  Advance Directive Discussion    The above risks/problems have been discussed with the patient.  Pertinent information has been shared with the patient in the After Visit Summary.  Follow up plans and orders are seen below in the Assessment/Plan Section.    Diagnoses and all orders for this visit:    1. Routine general medical examination at a health care facility (Primary)  -     POCT urinalysis dipstick, automated  -     TSH; Future  -     Lipid Panel; Future  -     Comprehensive Metabolic Panel; Future  -     CBC & Differential; Future    2. Medicare annual wellness visit, subsequent    3. Essential hypertension  -     metoprolol succinate XL (TOPROL-XL) 100 MG 24 hr tablet; Take 1 tablet by mouth Daily.  Dispense: 90 tablet; Refill: 3  -     quinapril (ACCUPRIL) 40 MG tablet; Take 1 tablet by mouth Daily.  Dispense: 90 tablet; Refill: 3  -      hydroCHLOROthiazide (HYDRODIURIL) 25 MG tablet; Take 1 tablet by mouth Daily.  Dispense: 90 tablet; Refill: 3    4. Hyperlipidemia, unspecified hyperlipidemia type  -     simvastatin (ZOCOR) 40 MG tablet; Take 1 tablet by mouth Every Night.  Dispense: 90 tablet; Refill: 3    5. Benign non-nodular prostatic hyperplasia with lower urinary tract symptoms  -     finasteride (PROSCAR) 5 MG tablet; Take 1 tablet by mouth Daily.  Dispense: 90 tablet; Refill: 3      Preventive medicine discussed, diet, exercise, healthy living discussed importance of losing weight exercise  Follow Up:  No Follow-up on file.     An After Visit Summary and PPPS were given to the patient.

## 2019-10-08 LAB
ALBUMIN SERPL-MCNC: 4.7 G/DL (ref 3.5–5.2)
ALBUMIN/GLOB SERPL: 1.8 G/DL
ALP SERPL-CCNC: 89 U/L (ref 39–117)
ALT SERPL-CCNC: 14 U/L (ref 1–41)
AST SERPL-CCNC: 15 U/L (ref 1–40)
BASOPHILS # BLD AUTO: 0.01 10*3/MM3 (ref 0–0.2)
BASOPHILS NFR BLD AUTO: 0.1 % (ref 0–1.5)
BILIRUB SERPL-MCNC: 0.5 MG/DL (ref 0.2–1.2)
BUN SERPL-MCNC: 14 MG/DL (ref 8–23)
BUN/CREAT SERPL: 14.4 (ref 7–25)
CALCIUM SERPL-MCNC: 10.1 MG/DL (ref 8.6–10.5)
CHLORIDE SERPL-SCNC: 99 MMOL/L (ref 98–107)
CHOLEST SERPL-MCNC: 138 MG/DL (ref 0–200)
CO2 SERPL-SCNC: 29.4 MMOL/L (ref 22–29)
CREAT SERPL-MCNC: 0.97 MG/DL (ref 0.76–1.27)
EOSINOPHIL # BLD AUTO: 0.06 10*3/MM3 (ref 0–0.4)
EOSINOPHIL NFR BLD AUTO: 0.9 % (ref 0.3–6.2)
ERYTHROCYTE [DISTWIDTH] IN BLOOD BY AUTOMATED COUNT: 13.7 % (ref 12.3–15.4)
GLOBULIN SER CALC-MCNC: 2.6 GM/DL
GLUCOSE SERPL-MCNC: 101 MG/DL (ref 65–99)
HCT VFR BLD AUTO: 43.2 % (ref 37.5–51)
HDLC SERPL-MCNC: 44 MG/DL (ref 40–60)
HGB BLD-MCNC: 14.4 G/DL (ref 13–17.7)
IMM GRANULOCYTES # BLD AUTO: 0.03 10*3/MM3 (ref 0–0.05)
IMM GRANULOCYTES NFR BLD AUTO: 0.4 % (ref 0–0.5)
LDLC SERPL CALC-MCNC: 66 MG/DL (ref 0–100)
LYMPHOCYTES # BLD AUTO: 1.66 10*3/MM3 (ref 0.7–3.1)
LYMPHOCYTES NFR BLD AUTO: 23.9 % (ref 19.6–45.3)
MCH RBC QN AUTO: 29.3 PG (ref 26.6–33)
MCHC RBC AUTO-ENTMCNC: 33.3 G/DL (ref 31.5–35.7)
MCV RBC AUTO: 87.8 FL (ref 79–97)
MONOCYTES # BLD AUTO: 0.68 10*3/MM3 (ref 0.1–0.9)
MONOCYTES NFR BLD AUTO: 9.8 % (ref 5–12)
NEUTROPHILS # BLD AUTO: 4.5 10*3/MM3 (ref 1.7–7)
NEUTROPHILS NFR BLD AUTO: 64.9 % (ref 42.7–76)
NRBC BLD AUTO-RTO: 0.1 /100 WBC (ref 0–0.2)
PLATELET # BLD AUTO: 233 10*3/MM3 (ref 140–450)
POTASSIUM SERPL-SCNC: 4 MMOL/L (ref 3.5–5.2)
PROT SERPL-MCNC: 7.3 G/DL (ref 6–8.5)
RBC # BLD AUTO: 4.92 10*6/MM3 (ref 4.14–5.8)
SODIUM SERPL-SCNC: 141 MMOL/L (ref 136–145)
TRIGL SERPL-MCNC: 141 MG/DL (ref 0–150)
TSH SERPL DL<=0.005 MIU/L-ACNC: 1.49 UIU/ML (ref 0.27–4.2)
VLDLC SERPL CALC-MCNC: 28.2 MG/DL
WBC # BLD AUTO: 6.94 10*3/MM3 (ref 3.4–10.8)

## 2019-10-08 NOTE — PROGRESS NOTES
I have reviewed the notes, assessments, and/or procedures performed by Jose Archibald PA-C, I concur with his documentation of Rhett Isaacs.

## 2019-10-11 ENCOUNTER — TELEPHONE (OUTPATIENT)
Dept: FAMILY MEDICINE CLINIC | Facility: CLINIC | Age: 69
End: 2019-10-11

## 2019-10-14 DIAGNOSIS — N40.0 BENIGN PROSTATIC HYPERPLASIA WITHOUT LOWER URINARY TRACT SYMPTOMS: Primary | ICD-10-CM

## 2019-10-19 ENCOUNTER — RESULTS ENCOUNTER (OUTPATIENT)
Dept: FAMILY MEDICINE CLINIC | Facility: CLINIC | Age: 69
End: 2019-10-19

## 2019-10-19 DIAGNOSIS — N40.0 BENIGN PROSTATIC HYPERPLASIA WITHOUT LOWER URINARY TRACT SYMPTOMS: ICD-10-CM

## 2019-11-11 DIAGNOSIS — N40.1 BPH WITH OBSTRUCTION/LOWER URINARY TRACT SYMPTOMS: Primary | ICD-10-CM

## 2019-11-11 DIAGNOSIS — N13.8 BPH WITH OBSTRUCTION/LOWER URINARY TRACT SYMPTOMS: Primary | ICD-10-CM

## 2019-11-16 ENCOUNTER — RESULTS ENCOUNTER (OUTPATIENT)
Dept: FAMILY MEDICINE CLINIC | Facility: CLINIC | Age: 69
End: 2019-11-16

## 2019-11-16 DIAGNOSIS — N40.1 BPH WITH OBSTRUCTION/LOWER URINARY TRACT SYMPTOMS: ICD-10-CM

## 2019-11-16 DIAGNOSIS — N13.8 BPH WITH OBSTRUCTION/LOWER URINARY TRACT SYMPTOMS: ICD-10-CM

## 2020-09-23 DIAGNOSIS — E78.5 HYPERLIPIDEMIA, UNSPECIFIED HYPERLIPIDEMIA TYPE: ICD-10-CM

## 2020-09-23 DIAGNOSIS — I10 ESSENTIAL HYPERTENSION: ICD-10-CM

## 2020-09-23 DIAGNOSIS — N40.1 BENIGN NON-NODULAR PROSTATIC HYPERPLASIA WITH LOWER URINARY TRACT SYMPTOMS: ICD-10-CM

## 2020-09-23 RX ORDER — QUINAPRIL 40 MG/1
40 TABLET ORAL DAILY
Qty: 90 TABLET | Refills: 3 | Status: SHIPPED | OUTPATIENT
Start: 2020-09-23 | End: 2021-09-24

## 2020-09-23 RX ORDER — METOPROLOL SUCCINATE 100 MG/1
100 TABLET, EXTENDED RELEASE ORAL DAILY
Qty: 90 TABLET | Refills: 3 | Status: SHIPPED | OUTPATIENT
Start: 2020-09-23 | End: 2021-09-24

## 2020-09-23 RX ORDER — FINASTERIDE 5 MG/1
5 TABLET, FILM COATED ORAL DAILY
Qty: 90 TABLET | Refills: 3 | Status: SHIPPED | OUTPATIENT
Start: 2020-09-23 | End: 2021-09-24

## 2020-09-23 RX ORDER — SIMVASTATIN 40 MG
40 TABLET ORAL NIGHTLY
Qty: 90 TABLET | Refills: 3 | Status: SHIPPED | OUTPATIENT
Start: 2020-09-23 | End: 2021-09-24

## 2020-09-23 RX ORDER — HYDROCHLOROTHIAZIDE 25 MG/1
25 TABLET ORAL DAILY
Qty: 90 TABLET | Refills: 3 | Status: SHIPPED | OUTPATIENT
Start: 2020-09-23 | End: 2021-09-24

## 2020-09-23 NOTE — TELEPHONE ENCOUNTER
PATIENT HAS WELLNESS SCHEDULED NEXT MTH WITH [facility], BUT NEEDS THE FOLLOWING REFILLS:     SIMVASTATIN 40 MG 1 DAILY  METOPROLOL 100 MG 1 DAILY  HYDROCHLOROTHIAZIDE 25 MG  QUINAPRIL 40 MG 1 DAILY  FINASTERIDE 5 MG    [facility]      JUSTICE: [phone]

## 2020-10-29 ENCOUNTER — OFFICE VISIT (OUTPATIENT)
Dept: FAMILY MEDICINE CLINIC | Facility: CLINIC | Age: 70
End: 2020-10-29

## 2020-10-29 ENCOUNTER — LAB (OUTPATIENT)
Dept: LAB | Facility: HOSPITAL | Age: 70
End: 2020-10-29

## 2020-10-29 VITALS
OXYGEN SATURATION: 99 % | HEIGHT: 69 IN | WEIGHT: 181.4 LBS | TEMPERATURE: 97.3 F | HEART RATE: 66 BPM | SYSTOLIC BLOOD PRESSURE: 132 MMHG | DIASTOLIC BLOOD PRESSURE: 76 MMHG | RESPIRATION RATE: 14 BRPM | BODY MASS INDEX: 26.87 KG/M2

## 2020-10-29 DIAGNOSIS — Z23 IMMUNIZATION DUE: ICD-10-CM

## 2020-10-29 DIAGNOSIS — N13.8 BPH WITH OBSTRUCTION/LOWER URINARY TRACT SYMPTOMS: ICD-10-CM

## 2020-10-29 DIAGNOSIS — N40.1 BPH WITH OBSTRUCTION/LOWER URINARY TRACT SYMPTOMS: ICD-10-CM

## 2020-10-29 DIAGNOSIS — Z00.00 ROUTINE GENERAL MEDICAL EXAMINATION AT A HEALTH CARE FACILITY: Primary | ICD-10-CM

## 2020-10-29 DIAGNOSIS — Z00.00 MEDICARE ANNUAL WELLNESS VISIT, SUBSEQUENT: ICD-10-CM

## 2020-10-29 LAB
BILIRUB BLD-MCNC: NEGATIVE MG/DL
CLARITY, POC: CLEAR
COLOR UR: YELLOW
GLUCOSE UR STRIP-MCNC: NEGATIVE MG/DL
KETONES UR QL: NEGATIVE
LEUKOCYTE EST, POC: NEGATIVE
NITRITE UR-MCNC: NEGATIVE MG/ML
PH UR: 7.5 [PH] (ref 5–8)
PROT UR STRIP-MCNC: ABNORMAL MG/DL
RBC # UR STRIP: ABNORMAL /UL
SP GR UR: 1.01 (ref 1–1.03)
UROBILINOGEN UR QL: NORMAL

## 2020-10-29 PROCEDURE — 93005 ELECTROCARDIOGRAM TRACING: CPT | Performed by: PHYSICIAN ASSISTANT

## 2020-10-29 PROCEDURE — 96160 PT-FOCUSED HLTH RISK ASSMT: CPT | Performed by: PHYSICIAN ASSISTANT

## 2020-10-29 PROCEDURE — G0439 PPPS, SUBSEQ VISIT: HCPCS | Performed by: PHYSICIAN ASSISTANT

## 2020-10-29 PROCEDURE — 81003 URINALYSIS AUTO W/O SCOPE: CPT | Performed by: PHYSICIAN ASSISTANT

## 2020-10-29 NOTE — PROGRESS NOTES
The ABCs of the Annual Wellness Visit  Subsequent Medicare Wellness Visit    Chief Complaint   Patient presents with   • Medicare Wellness-subsequent     Fasting for labs. UTD on colonoscopy. Due for [id number].        Subjective   History of Present Illness:  Rhett Isaacs is a 70 y.o. male who presents for a Subsequent Medicare Wellness Visit.    HEALTH RISK ASSESSMENT    Recent Hospitalizations:  No hospitalization(s) within the last year.    Current Medical Providers:  Patient Care Team:  Micky Archibald PA as PCP - General (Family Medicine)    Smoking Status:  Social History     Tobacco Use   Smoking Status Never Smoker   Smokeless Tobacco Never Used       Alcohol Consumption:  Social History     Substance and Sexual Activity   Alcohol Use No       Depression Screen:   PHQ-2/PHQ-9 Depression Screening 10/29/2020   Little interest or pleasure in doing things 0   Feeling down, depressed, or hopeless 0   Total Score 0       Fall Risk Screen:  JANI Fall Risk Assessment was completed, and patient is at LOW risk for falls.Assessment completed on:10/29/2020    Health Habits and Functional and Cognitive Screening:  Functional & Cognitive Status 10/29/2020   Do you have difficulty preparing food and eating? No   Do you have difficulty bathing yourself, getting dressed or grooming yourself? No   Do you have difficulty using the toilet? No   Do you have difficulty moving around from place to place? No   Do you have trouble with steps or getting out of a bed or a chair? No   Current Diet Well Balanced Diet   Dental Exam Up to date   Eye Exam Not up to date   Exercise (times per week) 3 times per week   Current Exercise Activities Include Yard Work   Do you need help using the phone?  No   Are you deaf or do you have serious difficulty hearing?  No   Do you need help with transportation? No   Do you need help shopping? No   Do you need help preparing meals?  No   Do you need help with housework?  No   Do you  need help with laundry? No   Do you need help taking your medications? No   Do you need help managing money? No   Do you ever drive or ride in a car without wearing a seat belt? No   Have you felt unusual stress, anger or loneliness in the last month? No   Who do you live with? Spouse   If you need help, do you have trouble finding someone available to you? No   Have you been bothered in the last four weeks by sexual problems? No   Do you have difficulty concentrating, remembering or making decisions? No         Does the patient have evidence of cognitive impairment? No    Asprin use counseling:Taking ASA appropriately as indicated    Age-appropriate Screening Schedule:  Refer to the list below for future screening recommendations based on patient's age, sex and/or medical conditions. Orders for these recommended tests are listed in the plan section. The patient has been provided with a written plan.    Health Maintenance   Topic Date Due   • LIPID PANEL  10/07/2020   • INFLUENZA VACCINE  11/11/2020 (Originally 8/1/2020)   • TDAP/TD VACCINES (2 - Td) 10/01/2022   • COLONOSCOPY  10/23/2027   • ZOSTER VACCINE  Completed          The following portions of the patient's history were reviewed and updated as appropriate: allergies, current medications, past family history, past medical history, past social history, past surgical history and problem list.    Outpatient Medications Prior to Visit   Medication Sig Dispense Refill   • finasteride (PROSCAR) 5 MG tablet Take 1 tablet by mouth Daily. 90 tablet 3   • hydroCHLOROthiazide (HYDRODIURIL) 25 MG tablet Take 1 tablet by mouth Daily. 90 tablet 3   • Magnesium 250 MG tablet Take  by mouth.     • metoprolol succinate XL (TOPROL-XL) 100 MG 24 hr tablet Take 1 tablet by mouth Daily. 90 tablet 3   • quinapril (ACCUPRIL) 40 MG tablet Take 1 tablet by mouth Daily. 90 tablet 3   • simvastatin (ZOCOR) 40 MG tablet Take 1 tablet by mouth Every Night. 90 tablet 3     No  "facility-administered medications prior to visit.        Patient Active Problem List   Diagnosis   • Benign hypertrophy of prostate   • Mixed hyperlipidemia   • Essential hypertension, benign       Advanced Care Planning:  ACP discussion was held with the patient during this visit. Patient does not have an advance directive, information provided.    Review of Systems   Constitutional: Negative.    HENT: Negative.    Eyes: Negative.    Respiratory: Negative.    Cardiovascular: Negative.    Gastrointestinal: Negative.    Endocrine: Negative.    Genitourinary: Negative.    Musculoskeletal: Negative.    Skin: Negative.    Allergic/Immunologic: Negative.    Neurological: Negative.    Hematological: Negative.    Psychiatric/Behavioral: Negative.    All other systems reviewed and are negative.      Compared to one year ago, the patient feels his physical health is better.  Compared to one year ago, the patient feels his mental health is better.    Reviewed chart for potential of high risk medication in the elderly: yes  Reviewed chart for potential of harmful drug interactions in the elderly:yes    Objective         Vitals:    10/29/20 1010   BP: 132/76   Pulse: 66   Resp: 14   Temp: 97.3 °F (36.3 °C)   TempSrc: Infrared   SpO2: 99%   Weight: 82.3 kg (181 lb 6.4 oz)   Height: 175.3 cm (69\")   PainSc: 0-No pain       Body mass index is 26.79 kg/m².  Discussed the patient's BMI with him. The BMI is in the acceptable range.    Physical Exam  Constitutional:       Appearance: He is well-developed.   HENT:      Head: Normocephalic and atraumatic.      Right Ear: External ear normal.      Left Ear: External ear normal.      Nose: Nose normal.   Eyes:      Conjunctiva/sclera: Conjunctivae normal.      Pupils: Pupils are equal, round, and reactive to light.   Neck:      Musculoskeletal: Normal range of motion and neck supple.      Thyroid: No thyromegaly.   Cardiovascular:      Rate and Rhythm: Normal rate and regular rhythm.      " Heart sounds: Normal heart sounds. No murmur.   Pulmonary:      Effort: Pulmonary effort is normal.      Breath sounds: Normal breath sounds.   Abdominal:      General: Bowel sounds are normal.      Palpations: Abdomen is soft. There is no mass.      Tenderness: There is no abdominal tenderness.   Genitourinary:     Penis: Normal.       Prostate: Normal.      Rectum: Normal.   Musculoskeletal: Normal range of motion.   Skin:     General: Skin is warm and dry.   Neurological:      Mental Status: He is alert and oriented to person, place, and time.      Cranial Nerves: No cranial nerve deficit.      Deep Tendon Reflexes: Reflexes are normal and symmetric.       ECG 12 Lead    Date/Time: 10/29/2020 10:52 AM  Performed by: Micky Archibald PA  Authorized by: Micky Archibald PA   Comparison: not compared with previous ECG   Conduction: conduction normal  ST Segments: ST segments normal    Clinical impression: normal ECG                  Assessment/Plan   Medicare Risks and Personalized Health Plan  CMS Preventative Services Quick Reference  Immunizations Discussed/Encouraged (specific immunizations; Influenza and Pneumococcal 23 )    The above risks/problems have been discussed with the patient.  Pertinent information has been shared with the patient in the After Visit Summary.  Follow up plans and orders are seen below in the Assessment/Plan Section.    Diagnoses and all orders for this visit:    1. Routine general medical examination at a health care facility (Primary)  -     POCT urinalysis dipstick, automated  -     Comprehensive Metabolic Panel; Future  -     Lipid Panel; Future  -     TSH; Future  -     CBC & Differential; Future    2. Medicare annual wellness visit, subsequent    3. BPH with obstruction/lower urinary tract symptoms  -     PSA DIAGNOSTIC; Future    Preventive discussed, diet, exercise, healthy living, also discussed living will  Follow Up:  No follow-ups on file.     An After Visit  Summary and PPPS were given to the patient.

## 2020-10-30 LAB
ALBUMIN SERPL-MCNC: 4.9 G/DL (ref 3.5–5.2)
ALBUMIN/GLOB SERPL: 2.1 G/DL
ALP SERPL-CCNC: 109 U/L (ref 39–117)
ALT SERPL-CCNC: 15 U/L (ref 1–41)
AST SERPL-CCNC: 14 U/L (ref 1–40)
BASOPHILS # BLD AUTO: NORMAL 10*3/UL
BILIRUB SERPL-MCNC: 0.4 MG/DL (ref 0–1.2)
BUN SERPL-MCNC: 15 MG/DL (ref 8–23)
BUN/CREAT SERPL: 15.5 (ref 7–25)
CALCIUM SERPL-MCNC: 9.9 MG/DL (ref 8.6–10.5)
CHLORIDE SERPL-SCNC: 99 MMOL/L (ref 98–107)
CHOLEST SERPL-MCNC: 128 MG/DL (ref 0–200)
CO2 SERPL-SCNC: 28.8 MMOL/L (ref 22–29)
CREAT SERPL-MCNC: 0.97 MG/DL (ref 0.76–1.27)
DIFFERENTIAL COMMENT: ABNORMAL
EOSINOPHIL # BLD AUTO: NORMAL 10*3/UL
EOSINOPHIL NFR BLD AUTO: NORMAL %
ERYTHROCYTE [DISTWIDTH] IN BLOOD BY AUTOMATED COUNT: 13.2 % (ref 12.3–15.4)
GLOBULIN SER CALC-MCNC: 2.3 GM/DL
GLUCOSE SERPL-MCNC: 98 MG/DL (ref 65–99)
HCT VFR BLD AUTO: 43.5 % (ref 37.5–51)
HDLC SERPL-MCNC: 43 MG/DL (ref 40–60)
HGB BLD-MCNC: 14.6 G/DL (ref 13–17.7)
LDLC SERPL CALC-MCNC: 62 MG/DL (ref 0–100)
LYMPHOCYTES # BLD AUTO: NORMAL 10*3/UL
LYMPHOCYTES # BLD MANUAL: 1.68 10*3/MM3 (ref 0.7–3.1)
LYMPHOCYTES NFR BLD AUTO: NORMAL %
LYMPHOCYTES NFR BLD MANUAL: 30.3 % (ref 19.6–45.3)
MCH RBC QN AUTO: 28.9 PG (ref 26.6–33)
MCHC RBC AUTO-ENTMCNC: 33.6 G/DL (ref 31.5–35.7)
MCV RBC AUTO: 86.1 FL (ref 79–97)
MONOCYTES # BLD MANUAL: 0.73 10*3/MM3 (ref 0.1–0.9)
MONOCYTES NFR BLD AUTO: NORMAL %
MONOCYTES NFR BLD MANUAL: 13.1 % (ref 5–12)
NEUTROPHILS # BLD MANUAL: 3.14 10*3/MM3 (ref 1.7–7)
NEUTROPHILS NFR BLD AUTO: NORMAL %
NEUTROPHILS NFR BLD MANUAL: 56.6 % (ref 42.7–76)
PLATELET # BLD AUTO: 215 10*3/MM3 (ref 140–450)
PLATELET BLD QL SMEAR: ABNORMAL
POTASSIUM SERPL-SCNC: 4.2 MMOL/L (ref 3.5–5.2)
PROT SERPL-MCNC: 7.2 G/DL (ref 6–8.5)
PSA SERPL-MCNC: 0.82 NG/ML (ref 0–4)
RBC # BLD AUTO: 5.05 10*6/MM3 (ref 4.14–5.8)
RBC MORPH BLD: ABNORMAL
SODIUM SERPL-SCNC: 140 MMOL/L (ref 136–145)
TRIGL SERPL-MCNC: 131 MG/DL (ref 0–150)
TSH SERPL DL<=0.005 MIU/L-ACNC: 1.33 UIU/ML (ref 0.27–4.2)
VLDLC SERPL CALC-MCNC: 23 MG/DL (ref 5–40)
WBC # BLD AUTO: 5.55 10*3/MM3 (ref 3.4–10.8)

## 2021-09-24 DIAGNOSIS — N40.1 BENIGN NON-NODULAR PROSTATIC HYPERPLASIA WITH LOWER URINARY TRACT SYMPTOMS: ICD-10-CM

## 2021-09-24 DIAGNOSIS — I10 ESSENTIAL HYPERTENSION: ICD-10-CM

## 2021-09-24 DIAGNOSIS — E78.5 HYPERLIPIDEMIA, UNSPECIFIED HYPERLIPIDEMIA TYPE: ICD-10-CM

## 2021-09-24 RX ORDER — SIMVASTATIN 40 MG
TABLET ORAL
Qty: 90 TABLET | Refills: 3 | Status: SHIPPED | OUTPATIENT
Start: 2021-09-24 | End: 2022-09-14

## 2021-09-24 RX ORDER — QUINAPRIL 40 MG/1
TABLET ORAL
Qty: 90 TABLET | Refills: 3 | Status: SHIPPED | OUTPATIENT
Start: 2021-09-24 | End: 2022-09-14

## 2021-09-24 RX ORDER — METOPROLOL SUCCINATE 100 MG/1
TABLET, EXTENDED RELEASE ORAL
Qty: 90 TABLET | Refills: 3 | Status: SHIPPED | OUTPATIENT
Start: 2021-09-24 | End: 2022-09-14

## 2021-09-24 RX ORDER — FINASTERIDE 5 MG/1
TABLET, FILM COATED ORAL
Qty: 90 TABLET | Refills: 3 | Status: SHIPPED | OUTPATIENT
Start: 2021-09-24 | End: 2021-11-01 | Stop reason: SDUPTHER

## 2021-09-24 RX ORDER — HYDROCHLOROTHIAZIDE 25 MG/1
TABLET ORAL
Qty: 90 TABLET | Refills: 3 | Status: SHIPPED | OUTPATIENT
Start: 2021-09-24 | End: 2021-11-01 | Stop reason: SDUPTHER

## 2021-11-01 ENCOUNTER — OFFICE VISIT (OUTPATIENT)
Dept: FAMILY MEDICINE CLINIC | Facility: CLINIC | Age: 71
End: 2021-11-01

## 2021-11-01 ENCOUNTER — LAB (OUTPATIENT)
Dept: LAB | Facility: HOSPITAL | Age: 71
End: 2021-11-01

## 2021-11-01 VITALS
RESPIRATION RATE: 14 BRPM | HEART RATE: 69 BPM | WEIGHT: 177.2 LBS | OXYGEN SATURATION: 99 % | HEIGHT: 69 IN | TEMPERATURE: 96.9 F | DIASTOLIC BLOOD PRESSURE: 82 MMHG | BODY MASS INDEX: 26.25 KG/M2 | SYSTOLIC BLOOD PRESSURE: 122 MMHG

## 2021-11-01 DIAGNOSIS — Z00.00 ROUTINE GENERAL MEDICAL EXAMINATION AT A HEALTH CARE FACILITY: ICD-10-CM

## 2021-11-01 DIAGNOSIS — N40.1 BPH ASSOCIATED WITH NOCTURIA: ICD-10-CM

## 2021-11-01 DIAGNOSIS — R35.1 BPH ASSOCIATED WITH NOCTURIA: ICD-10-CM

## 2021-11-01 DIAGNOSIS — Z00.00 MEDICARE ANNUAL WELLNESS VISIT, SUBSEQUENT: Primary | ICD-10-CM

## 2021-11-01 DIAGNOSIS — N40.1 BENIGN NON-NODULAR PROSTATIC HYPERPLASIA WITH LOWER URINARY TRACT SYMPTOMS: ICD-10-CM

## 2021-11-01 DIAGNOSIS — I10 ESSENTIAL HYPERTENSION: ICD-10-CM

## 2021-11-01 PROCEDURE — 1170F FXNL STATUS ASSESSED: CPT | Performed by: PHYSICIAN ASSISTANT

## 2021-11-01 PROCEDURE — G0439 PPPS, SUBSEQ VISIT: HCPCS | Performed by: PHYSICIAN ASSISTANT

## 2021-11-01 PROCEDURE — 1159F MED LIST DOCD IN RCRD: CPT | Performed by: PHYSICIAN ASSISTANT

## 2021-11-01 PROCEDURE — 93000 ELECTROCARDIOGRAM COMPLETE: CPT | Performed by: PHYSICIAN ASSISTANT

## 2021-11-01 PROCEDURE — 1126F AMNT PAIN NOTED NONE PRSNT: CPT | Performed by: PHYSICIAN ASSISTANT

## 2021-11-01 PROCEDURE — 96160 PT-FOCUSED HLTH RISK ASSMT: CPT | Performed by: PHYSICIAN ASSISTANT

## 2021-11-01 RX ORDER — HYDROCHLOROTHIAZIDE 25 MG/1
25 TABLET ORAL DAILY
Qty: 90 TABLET | Refills: 3 | Status: SHIPPED | OUTPATIENT
Start: 2021-11-01 | End: 2022-09-15 | Stop reason: SDUPTHER

## 2021-11-01 RX ORDER — FINASTERIDE 5 MG/1
5 TABLET, FILM COATED ORAL DAILY
Qty: 90 TABLET | Refills: 3 | Status: SHIPPED | OUTPATIENT
Start: 2021-11-01 | End: 2022-09-15 | Stop reason: SDUPTHER

## 2021-11-01 NOTE — PROGRESS NOTES
The ABCs of the Annual Wellness Visit  Subsequent Medicare Wellness Visit    Chief Complaint   Patient presents with   • Medicare Wellness-subsequent     Fasting for labs. Due for colonoscopy.       Subjective   History of Present Illness:  Rhett Isaacs is a 71 y.o. male who presents for a Subsequent Medicare Wellness Visit.    The following portions of the patient's history were reviewed and   updated as appropriate: allergies, current medications, past family history, past medical history, past social history, past surgical history and problem list.    Compared to one year ago, the patient feels his physical   health is better.    Compared to one year ago, the patient feels his mental   health is better.    Recent Hospitalizations:  He was not admitted to the hospital during the last year.       Current Medical Providers:  Patient Care Team:  Micky Archibald PA as PCP - General (Family Medicine)  Salvatore Torres MD as Consulting Physician (Gastroenterology)    Outpatient Medications Prior to Visit   Medication Sig Dispense Refill   • Magnesium 250 MG tablet Take  by mouth.     • metoprolol succinate XL (TOPROL-XL) 100 MG 24 hr tablet TAKE 1 TABLET BY MOUTH EVERY DAY 90 tablet 3   • quinapril (ACCUPRIL) 40 MG tablet TAKE 1 TABLET BY MOUTH EVERY DAY 90 tablet 3   • simvastatin (ZOCOR) 40 MG tablet TAKE 1 TABLET BY MOUTH EVERY DAY AT NIGHT 90 tablet 3   • finasteride (PROSCAR) 5 MG tablet TAKE 1 TABLET BY MOUTH EVERY DAY 90 tablet 3   • hydroCHLOROthiazide (HYDRODIURIL) 25 MG tablet TAKE 1 TABLET BY MOUTH EVERY DAY 90 tablet 3     No facility-administered medications prior to visit.       No opioid medication identified on active medication list. I have reviewed chart for other potential  high risk medication/s and harmful drug interactions in the elderly.          Aspirin is not on active medication list.  Aspirin use is indicated based on review of current medical condition/s. Pros and cons of  "this therapy have been discussed with this patient. Benefits of this medication outweigh potential harm.  Patient has been instructed to start taking this medication..    Patient Active Problem List   Diagnosis   • Benign hypertrophy of prostate   • Mixed hyperlipidemia   • Essential hypertension, benign     Advance Care Planning  has an advanced directive - a copy HAS NOT been provided. Have asked the patient to send this to us to add to record    Review of Systems   Constitutional: Negative.    HENT: Negative.    Eyes: Negative.    Respiratory: Negative.    Cardiovascular: Negative.    Gastrointestinal: Negative.    Endocrine: Negative.    Genitourinary: Negative.    Musculoskeletal: Negative.    Skin: Negative.    Allergic/Immunologic: Negative.    Neurological: Negative.    Hematological: Negative.    Psychiatric/Behavioral: Negative.    All other systems reviewed and are negative.        Objective      Vitals:    11/01/21 0954   BP: 122/82   Pulse: 69   Resp: 14   Temp: 96.9 °F (36.1 °C)   TempSrc: Infrared   SpO2: 99%   Weight: 80.4 kg (177 lb 3.2 oz)   Height: 175.3 cm (69.02\")   PainSc: 0-No pain     BMI Readings from Last 1 Encounters:   11/01/21 26.16 kg/m²   BMI is above normal parameters. Recommendations include: educational material and nutrition counseling    Does the patient have evidence of cognitive impairment? No    Physical Exam  Constitutional:       Appearance: He is well-developed.   HENT:      Head: Normocephalic and atraumatic.      Right Ear: External ear normal.      Left Ear: External ear normal.      Nose: Nose normal.   Eyes:      Conjunctiva/sclera: Conjunctivae normal.      Pupils: Pupils are equal, round, and reactive to light.   Neck:      Thyroid: No thyromegaly.   Cardiovascular:      Rate and Rhythm: Normal rate and regular rhythm.      Heart sounds: Normal heart sounds. No murmur heard.      Pulmonary:      Effort: Pulmonary effort is normal.      Breath sounds: Normal breath " sounds.   Abdominal:      General: Bowel sounds are normal.      Palpations: Abdomen is soft. There is no mass.      Tenderness: There is no abdominal tenderness.   Genitourinary:     Penis: Normal.       Prostate: Normal.      Rectum: Normal.   Musculoskeletal:         General: Normal range of motion.      Cervical back: Normal range of motion and neck supple.   Skin:     General: Skin is warm and dry.   Neurological:      Mental Status: He is alert and oriented to person, place, and time.      Cranial Nerves: No cranial nerve deficit.      Deep Tendon Reflexes: Reflexes are normal and symmetric.               ECG 12 Lead    Date/Time: 11/1/2021 2:24 PM  Performed by: Micky Archibald PA  Authorized by: Micky Archibald PA   Comparison: not compared with previous ECG   Rhythm: sinus rhythm  Rate: normal  Conduction: conduction normal  ST Segments: ST segments normal  QRS axis: normal  Other findings: non-specific ST-T wave changes    Clinical impression: normal ECG            HEALTH RISK ASSESSMENT    Smoking Status:  Social History     Tobacco Use   Smoking Status Never Smoker   Smokeless Tobacco Never Used     Alcohol Consumption:  Social History     Substance and Sexual Activity   Alcohol Use No     Fall Risk Screen:    STEADI Fall Risk Assessment was completed, and patient is at LOW risk for falls.Assessment completed on:11/1/2021    Depression Screening:  PHQ-2/PHQ-9 Depression Screening 11/1/2021   Little interest or pleasure in doing things 0   Feeling down, depressed, or hopeless 0   Total Score 0       Health Habits and Functional and Cognitive Screening:  Functional & Cognitive Status 11/1/2021   Do you have difficulty preparing food and eating? No   Do you have difficulty bathing yourself, getting dressed or grooming yourself? No   Do you have difficulty using the toilet? No   Do you have difficulty moving around from place to place? No   Do you have trouble with steps or getting out of a  bed or a chair? No   Current Diet Well Balanced Diet   Dental Exam Up to date   Eye Exam Not up to date   Exercise (times per week) 7 times per week   Current Exercises Include Yard Work;Walking;Bicycling Outdoors   Current Exercise Activities Include -   Do you need help using the phone?  No   Are you deaf or do you have serious difficulty hearing?  No   Do you need help with transportation? No   Do you need help shopping? No   Do you need help preparing meals?  No   Do you need help with housework?  No   Do you need help with laundry? No   Do you need help taking your medications? No   Do you need help managing money? No   Do you ever drive or ride in a car without wearing a seat belt? No   Have you felt unusual stress, anger or loneliness in the last month? No   Who do you live with? Spouse   If you need help, do you have trouble finding someone available to you? No   Have you been bothered in the last four weeks by sexual problems? No   Do you have difficulty concentrating, remembering or making decisions? No       Age-appropriate Screening Schedule:  Refer to the list below for future screening recommendations based on patient's age, sex and/or medical conditions. Orders for these recommended tests are listed in the plan section. The patient has been provided with a written plan.    Health Maintenance   Topic Date Due   • LIPID PANEL  10/29/2021   • INFLUENZA VACCINE  11/01/2022 (Originally 8/1/2021)   • TDAP/TD VACCINES (2 - Td or Tdap) 10/01/2022   • ZOSTER VACCINE  Completed              Assessment/Plan     CMS Preventative Services Quick Reference  Risk Factors Identified During Encounter  Cardiovascular Disease  The above risks/problems have been discussed with the patient.  Follow up actions/plans if indicated are seen below in the Assessment/Plan Section.  Pertinent information has been shared with the patient in the After Visit Summary.    Diagnoses and all orders for this visit:    1. Medicare annual  wellness visit, subsequent (Primary)    2. Routine general medical examination at a health care facility  -     Comprehensive Metabolic Panel; Future  -     CBC & Differential; Future  -     Lipid Panel; Future  -     TSH; Future    3. BPH associated with nocturia  -     PSA DIAGNOSTIC; Future    4. Benign non-nodular prostatic hyperplasia with lower urinary tract symptoms  -     finasteride (PROSCAR) 5 MG tablet; Take 1 tablet by mouth Daily.  Dispense: 90 tablet; Refill: 3    5. Essential hypertension  -     hydroCHLOROthiazide (HYDRODIURIL) 25 MG tablet; Take 1 tablet by mouth Daily.  Dispense: 90 tablet; Refill: 3      Preventive medicine discussed, diet, exercise, healthy living discussed at length.  Discussed nutrition, physical activity, healthy weight, injury prevention, missed use of tobacco, alcohol and drugs, dental health, mental health, immunizations, screening    Part of this note may be an electronic transcription/translation of spoken language to printed text using the Dragon Dictation System.      Follow Up:  No follow-ups on file.     An After Visit Summary and PPPS were given to the patient.

## 2021-11-02 LAB
ALBUMIN SERPL-MCNC: 4.6 G/DL (ref 3.5–5.2)
ALBUMIN/GLOB SERPL: 1.8 G/DL
ALP SERPL-CCNC: 93 U/L (ref 39–117)
ALT SERPL-CCNC: 20 U/L (ref 1–41)
AST SERPL-CCNC: 29 U/L (ref 1–40)
BASOPHILS # BLD AUTO: NORMAL 10*3/UL
BILIRUB SERPL-MCNC: 0.6 MG/DL (ref 0–1.2)
BUN SERPL-MCNC: 17 MG/DL (ref 8–23)
BUN/CREAT SERPL: 18.3 (ref 7–25)
CALCIUM SERPL-MCNC: 9.9 MG/DL (ref 8.6–10.5)
CHLORIDE SERPL-SCNC: 100 MMOL/L (ref 98–107)
CHOLEST SERPL-MCNC: 137 MG/DL (ref 0–200)
CO2 SERPL-SCNC: 27.7 MMOL/L (ref 22–29)
CREAT SERPL-MCNC: 0.93 MG/DL (ref 0.76–1.27)
DIFFERENTIAL COMMENT: NORMAL
EOSINOPHIL # BLD AUTO: NORMAL 10*3/UL
EOSINOPHIL NFR BLD AUTO: NORMAL %
ERYTHROCYTE [DISTWIDTH] IN BLOOD BY AUTOMATED COUNT: 13.9 % (ref 12.3–15.4)
GLOBULIN SER CALC-MCNC: 2.5 GM/DL
GLUCOSE SERPL-MCNC: 103 MG/DL (ref 65–99)
HCT VFR BLD AUTO: 42.6 % (ref 37.5–51)
HDLC SERPL-MCNC: 41 MG/DL (ref 40–60)
HGB BLD-MCNC: 13.7 G/DL (ref 13–17.7)
LDLC SERPL CALC-MCNC: 72 MG/DL (ref 0–100)
LYMPHOCYTES # BLD AUTO: NORMAL 10*3/UL
LYMPHOCYTES # BLD MANUAL: 1.44 10*3/MM3 (ref 0.7–3.1)
LYMPHOCYTES NFR BLD AUTO: NORMAL %
LYMPHOCYTES NFR BLD MANUAL: 24.7 % (ref 19.6–45.3)
MCH RBC QN AUTO: 27.9 PG (ref 26.6–33)
MCHC RBC AUTO-ENTMCNC: 32.2 G/DL (ref 31.5–35.7)
MCV RBC AUTO: 86.8 FL (ref 79–97)
MONOCYTES # BLD MANUAL: 0.36 10*3/MM3 (ref 0.1–0.9)
MONOCYTES NFR BLD AUTO: NORMAL %
MONOCYTES NFR BLD MANUAL: 6.2 % (ref 5–12)
NEUTROPHILS # BLD MANUAL: 4.04 10*3/MM3 (ref 1.7–7)
NEUTROPHILS NFR BLD AUTO: NORMAL %
NEUTROPHILS NFR BLD MANUAL: 69.1 % (ref 42.7–76)
PLATELET # BLD AUTO: 216 10*3/MM3 (ref 140–450)
PLATELET BLD QL SMEAR: NORMAL
POTASSIUM SERPL-SCNC: 4.5 MMOL/L (ref 3.5–5.2)
PROT SERPL-MCNC: 7.1 G/DL (ref 6–8.5)
PSA SERPL-MCNC: 1.1 NG/ML (ref 0–4)
RBC # BLD AUTO: 4.91 10*6/MM3 (ref 4.14–5.8)
RBC MORPH BLD: NORMAL
SODIUM SERPL-SCNC: 140 MMOL/L (ref 136–145)
TRIGL SERPL-MCNC: 139 MG/DL (ref 0–150)
TSH SERPL DL<=0.005 MIU/L-ACNC: 1.27 UIU/ML (ref 0.27–4.2)
VLDLC SERPL CALC-MCNC: 24 MG/DL (ref 5–40)
WBC # BLD AUTO: 5.85 10*3/MM3 (ref 3.4–10.8)

## 2022-09-14 DIAGNOSIS — E78.5 HYPERLIPIDEMIA, UNSPECIFIED HYPERLIPIDEMIA TYPE: ICD-10-CM

## 2022-09-14 DIAGNOSIS — I10 ESSENTIAL HYPERTENSION: ICD-10-CM

## 2022-09-14 RX ORDER — SIMVASTATIN 40 MG
TABLET ORAL
Qty: 90 TABLET | Refills: 3 | Status: SHIPPED | OUTPATIENT
Start: 2022-09-14

## 2022-09-14 RX ORDER — QUINAPRIL 40 MG/1
TABLET ORAL
Qty: 90 TABLET | Refills: 3 | Status: SHIPPED | OUTPATIENT
Start: 2022-09-14 | End: 2023-03-14 | Stop reason: RX

## 2022-09-14 RX ORDER — METOPROLOL SUCCINATE 100 MG/1
TABLET, EXTENDED RELEASE ORAL
Qty: 90 TABLET | Refills: 3 | Status: SHIPPED | OUTPATIENT
Start: 2022-09-14

## 2022-09-15 ENCOUNTER — TELEPHONE (OUTPATIENT)
Dept: FAMILY MEDICINE CLINIC | Facility: CLINIC | Age: 72
End: 2022-09-15

## 2022-09-15 DIAGNOSIS — N40.1 BENIGN NON-NODULAR PROSTATIC HYPERPLASIA WITH LOWER URINARY TRACT SYMPTOMS: ICD-10-CM

## 2022-09-15 DIAGNOSIS — I10 ESSENTIAL HYPERTENSION: ICD-10-CM

## 2022-09-15 RX ORDER — HYDROCHLOROTHIAZIDE 25 MG/1
25 TABLET ORAL DAILY
Qty: 90 TABLET | Refills: 3 | Status: SHIPPED | OUTPATIENT
Start: 2022-09-15

## 2022-09-15 RX ORDER — FINASTERIDE 5 MG/1
5 TABLET, FILM COATED ORAL DAILY
Qty: 90 TABLET | Refills: 3 | Status: SHIPPED | OUTPATIENT
Start: 2022-09-15

## 2022-09-15 NOTE — TELEPHONE ENCOUNTER
----- Message from Rhett Isaacs sent at 9/14/2022  4:58 PM EDT -----  Regarding: Prescription renewals  I just received a message re:  prescription renewals.  The following three were listed:  Meteprolol succ  mg, Simvistatin 40mg, and Quinipril 40 mg.  Please add Hydrochloride 25 mg and Finasteride 5 mg.  Should have a total of 5 and they are on refilled for 3 months at [facility] pharmacy [address]     Thanks, see you in November for my medicare wellness exam.

## 2022-10-12 ENCOUNTER — OFFICE VISIT (OUTPATIENT)
Dept: FAMILY MEDICINE CLINIC | Facility: CLINIC | Age: 72
End: 2022-10-12

## 2022-10-12 VITALS
HEIGHT: 69 IN | HEART RATE: 71 BPM | OXYGEN SATURATION: 97 % | BODY MASS INDEX: 25.18 KG/M2 | SYSTOLIC BLOOD PRESSURE: 130 MMHG | DIASTOLIC BLOOD PRESSURE: 76 MMHG | WEIGHT: 170 LBS | TEMPERATURE: 97.2 F

## 2022-10-12 DIAGNOSIS — R05.9 COUGH, UNSPECIFIED TYPE: Primary | ICD-10-CM

## 2022-10-12 DIAGNOSIS — J69.0 ASPIRATION PNEUMONIA OF LEFT LUNG DUE TO GASTRIC SECRETIONS, UNSPECIFIED PART OF LUNG: ICD-10-CM

## 2022-10-12 PROCEDURE — 99212 OFFICE O/P EST SF 10 MIN: CPT | Performed by: PHYSICIAN ASSISTANT

## 2022-10-12 NOTE — PROGRESS NOTES
Subjective   Rhett Isaacs is a 72 y.o. male  Pneumonia (Follow up on pneumonia, still having some chest discomfort, and fatigue )      History of Present Illness    Patient is a 72-year-old male seen today for follow-up on pneumonia.    The patient reports that approximately 4 weeks ago he aspirated some stomach acid. He states that he turned over on his stomach and got tired of on his side and back. The patient reports started coughing, choking, and gasping for air for approximately 2 hours before he could get settled down. He states that he has done this before, but it never led to pneumonia, but this time it got him. The patient reports that he was feverish and took his temperature, but did not indicate that he was digital from all that. He denies shortness of breath. The patient reports that he was coughing quite a bit. He states that he had some pain under his left nipple. The patient reports that he needed his amended heart pillow to cough. He denies having heart surgery. The patient reports that he had a home COVID-19 test and it was negative. He states that he feels much better. The patient reports that there is hardly any tenderness in there. He states that he is able to take a full deep breath. The patient reports that he still has a little bit of a dry cough, but nothing like he had. He did finish off the antibiotic yesterday. The patient had walking pneumonia approximately 40 years ago. He states that Dr. Wright did some chest x-rays and some blood work. The patient has been sick so long that his platelets were just about nonexistent. He states that he bought an oximeter and he has been monitoring it. The patient reports that it has been in the low 90s, but since he has been on the medication it has been staying above 95.    The following portions of the patient's history were reviewed and updated as appropriate: allergies, current medications, past social history and problem list    Review of  Systems   Constitutional: Negative for appetite change, chills, diaphoresis, fatigue, fever and unexpected weight change.   HENT: Negative for congestion.    Respiratory: Positive for cough ( much improved). Negative for choking, chest tightness, shortness of breath and wheezing.    Cardiovascular: Negative for chest pain and leg swelling.   Gastrointestinal: Negative.  Negative for abdominal distention, abdominal pain, diarrhea and nausea.        Patient experiencing heartburn/acid reflux     Musculoskeletal: Negative for back pain.   Allergic/Immunologic: Negative for environmental allergies, food allergies and immunocompromised state.       Objective     Vitals:    10/12/22 1606   BP: 130/76   Pulse: 71   Temp: 97.2 °F (36.2 °C)   SpO2: 97%       Physical Exam  Vitals and nursing note reviewed.   Constitutional:       General: He is not in acute distress.     Appearance: Normal appearance. He is well-developed. He is not ill-appearing, toxic-appearing or diaphoretic.   Neck:      Vascular: No carotid bruit or JVD.   Cardiovascular:      Rate and Rhythm: Normal rate and regular rhythm.      Pulses: Normal pulses.      Heart sounds: Normal heart sounds. No murmur heard.  Pulmonary:      Effort: Pulmonary effort is normal. No respiratory distress.      Breath sounds: Normal breath sounds. No wheezing, rhonchi or rales.   Abdominal:      Palpations: Abdomen is soft.      Tenderness: There is no abdominal tenderness.   Skin:     General: Skin is warm and dry.   Neurological:      Mental Status: He is alert.         Assessment & Plan     Diagnoses and all orders for this visit:    1. Cough, unspecified type (Primary)  -     XR Chest PA & Lateral; Future    2. Aspiration pneumonia of left lung due to gastric secretions, unspecified part of lung (HCC)  -     XR Chest PA & Lateral; Future       Will order a follow-up chest x-ray to be completed next week.    Answers for HPI/ROS submitted by the patient on 10/12/2022  What  is the primary reason for your visit?: Other  Please describe your symptoms.: Follow up on pneumonia diagnosis  Have you had these symptoms before?: No  How long have you been having these symptoms?: Greater than 2 weeks  Please list any medications you are currently taking for this condition.: 400 mg Moxifloxacin 1 tablet every day for 7 days  Please describe any probable cause for these symptoms. : x rays confirmed 1/3 of left lung had pneumonia in it    Transcribed from ambient dictation for Tasia Scott PA-C by Gauri Gifford.  10/12/22   17:40 EDT    Patient or patient representative verbalized consent to the visit recording.  I have personally performed the services described in this document as transcribed by the above individual, and it is both accurate and complete.  Tasia Scott PA-C  10/13/2022  13:28 EDT

## 2022-10-18 ENCOUNTER — TRANSCRIBE ORDERS (OUTPATIENT)
Dept: FAMILY MEDICINE CLINIC | Facility: CLINIC | Age: 72
End: 2022-10-18

## 2022-10-18 ENCOUNTER — HOSPITAL ENCOUNTER (OUTPATIENT)
Dept: GENERAL RADIOLOGY | Facility: HOSPITAL | Age: 72
Discharge: HOME | End: 2022-10-18
Admitting: PHYSICIAN ASSISTANT

## 2022-10-18 ENCOUNTER — TELEPHONE (OUTPATIENT)
Dept: FAMILY MEDICINE CLINIC | Facility: CLINIC | Age: 72
End: 2022-10-18

## 2022-10-18 DIAGNOSIS — J12.9 PNEUMONIA DUE TO VIRUS: ICD-10-CM

## 2022-10-18 DIAGNOSIS — J69.0 ASPIRATION PNEUMONIA OF LEFT LUNG DUE TO GASTRIC SECRETIONS, UNSPECIFIED PART OF LUNG: ICD-10-CM

## 2022-10-18 DIAGNOSIS — R06.02 SOB (SHORTNESS OF BREATH): Primary | ICD-10-CM

## 2022-10-18 DIAGNOSIS — R05.9 COUGH, UNSPECIFIED TYPE: ICD-10-CM

## 2022-10-18 PROCEDURE — 71046 X-RAY EXAM CHEST 2 VIEWS: CPT

## 2022-10-18 NOTE — TELEPHONE ENCOUNTER
----- Message from Tasia Scott PA-C sent at 10/18/2022 12:48 PM EDT -----  Please notify patient chest x-ray continues to show a density in his left upper lobe, radiologist recommends a CT scan of chest for further evaluation.  Mary Anne please place this order for me to sign

## 2022-10-19 ENCOUNTER — TELEPHONE (OUTPATIENT)
Dept: FAMILY MEDICINE CLINIC | Facility: CLINIC | Age: 72
End: 2022-10-19

## 2022-10-19 NOTE — TELEPHONE ENCOUNTER
----- Message from Rhett Isaacs sent at 10/19/2022 11:23 AM EDT -----  Regarding: CT scan of lungs  Contact: [phone]  Has the order been sent to [facility] on [address] for my CT scan?

## 2022-10-21 ENCOUNTER — TELEMEDICINE (OUTPATIENT)
Dept: FAMILY MEDICINE CLINIC | Facility: TELEHEALTH | Age: 72
End: 2022-10-21

## 2022-10-21 ENCOUNTER — TELEPHONE (OUTPATIENT)
Dept: FAMILY MEDICINE CLINIC | Facility: CLINIC | Age: 72
End: 2022-10-21

## 2022-10-21 DIAGNOSIS — J22 LOWER RESPIRATORY INFECTION (E.G., BRONCHITIS, PNEUMONIA, PNEUMONITIS, PULMONITIS): Primary | ICD-10-CM

## 2022-10-21 PROCEDURE — 99212 OFFICE O/P EST SF 10 MIN: CPT | Performed by: NURSE PRACTITIONER

## 2022-10-21 RX ORDER — LEVOFLOXACIN 500 MG/1
500 TABLET, FILM COATED ORAL DAILY
Qty: 10 TABLET | Refills: 0 | Status: SHIPPED | OUTPATIENT
Start: 2022-10-21 | End: 2022-11-07

## 2022-10-21 NOTE — TELEPHONE ENCOUNTER
Caller: Rhett Isaacs    Relationship: Self    Best call back number: [phone]    Requested Prescriptions:   Requested Prescriptions      No prescriptions requested or ordered in this encounter      AMOXIFLOXACIN 400 MG QD FOR 7 DAYS.      Pharmacy where request should be sent: [facility][id number] [address] [phone] [facility] [phone] FX     Additional details provided by patient: PATIENT STATES PNEUMONIA BACK AND HE WOULD LIKE A REFILL ON MEDICATION FOR THIS.  COUGH.  THE MEDICATION CLEARED HIM UP BUT NOW IT'S BACK.  NO APPOINTMENTS AVAILABLE.  PLEASE CALL PATIENT.      Does the patient have less than a 3 day supply:  [x] Yes  [] No    Dari Wu   10/21/22 08:13 EDT

## 2022-10-21 NOTE — TELEPHONE ENCOUNTER
Patient was prescribed Levaquin #7 days by urgent care Oct 5 and is requesting another prescription because symptoms are back.    He is scheduled for a CT of chest Oct 26

## 2022-10-21 NOTE — PROGRESS NOTES
HPI  Rhett Isaacs is a 72 y.o. male  presents with 10 day complaint of worsening cough (productive at times). Temp 99.8. Denies SOA, CP, runny nose, congestion. Was diagnosed with aspiration pneumonia on 10/5, prescribed moxifloxacin and symptoms resolved. Had a follow-up CXR on 10/12 which showed pneumonia had improved and was about when symptoms returned. He has chest CT on 10/26 for possible mass. O2 sat 97%.    Review of Systems    Past Medical History:   Diagnosis Date   • Acute bronchitis    • Acute prostatitis    • Acute serous otitis media    • Benign prostatic hyperplasia    • Cancer (HCC) September 2015    Basil cell carcinoma   • Cataract    • Colon polyp October 2017   • Diabetes mellitus (HCC) June 1967   • Diverticulosis 2004    Found in first colonoscopy   • Dysuria    • Fatigue    • GERD (gastroesophageal reflux disease) 2016   • Hyperlipidemia    • Hypertension    • Impacted cerumen    • Prostatitis        Family History   Problem Relation Age of Onset   • Alcohol abuse Father    • Hyperlipidemia Mother    • Hypertension Brother        Social History     Socioeconomic History   • Marital status:    Tobacco Use   • Smoking status: Never   • Smokeless tobacco: Never   Substance and Sexual Activity   • Alcohol use: No   • Drug use: No   • Sexual activity: Never         There were no vitals taken for this visit.    PHYSICAL EXAM  Physical Exam   Constitutional: He appears well-developed and well-nourished.   HENT:   Head: Normocephalic.   Nose: Nose normal.   Neck: Neck normal appearance.  Pulmonary/Chest: Effort normal.   Neurological: He is alert.   Psychiatric: He has a normal mood and affect. His speech is normal.       Diagnoses and all orders for this visit:    1. Lower respiratory infection (e.g., bronchitis, pneumonia, pneumonitis, pulmonitis) (Primary)    *While doing video visit with patient, his PCP office prescribed levaquin. Instructed patient to take as prescribed. He  verbalized understanding.       FOLLOW-UP  As discussed during visit with [facility], if symptoms worsen or fail to improve, follow-up with PCP/Urgent Care/Emergency Department.    Patient verbalizes understanding of medications, instructions for treatment and follow-up.    Mary Pineda, APRN  10/21/2022  12:45 EDT    The use of a video visit has been reviewed with the patient and verbal informed consent has been obtained. Myself and Rhett Isaacs participated in this visit. The patient is located in [place], [place], and I am located in [place], [place]. MyChart and Zoom were utilized.

## 2022-10-26 ENCOUNTER — HOSPITAL ENCOUNTER (OUTPATIENT)
Dept: CT IMAGING | Facility: HOSPITAL | Age: 72
Discharge: HOME | End: 2022-10-26
Admitting: PHYSICIAN ASSISTANT

## 2022-10-26 DIAGNOSIS — J12.9 PNEUMONIA DUE TO VIRUS: ICD-10-CM

## 2022-10-26 DIAGNOSIS — R06.02 SOB (SHORTNESS OF BREATH): ICD-10-CM

## 2022-10-26 LAB — CREAT BLDA-MCNC: 1 MG/DL (ref 0.6–1.3)

## 2022-10-26 PROCEDURE — 25010000002 IOPAMIDOL 61 % SOLUTION: Performed by: PHYSICIAN ASSISTANT

## 2022-10-26 PROCEDURE — 71270 CT THORAX DX C-/C+: CPT

## 2022-10-26 PROCEDURE — 82565 ASSAY OF CREATININE: CPT

## 2022-10-26 RX ADMIN — IOPAMIDOL 50 ML: 612 INJECTION, SOLUTION INTRAVENOUS at 15:25

## 2022-10-27 ENCOUNTER — TELEPHONE (OUTPATIENT)
Dept: FAMILY MEDICINE CLINIC | Facility: CLINIC | Age: 72
End: 2022-10-27

## 2022-10-27 DIAGNOSIS — R07.89 CHEST WALL PAIN: Primary | ICD-10-CM

## 2022-10-27 DIAGNOSIS — R91.8 LUNG MASS: Primary | ICD-10-CM

## 2022-10-27 RX ORDER — TRAMADOL HYDROCHLORIDE 50 MG/1
50 TABLET ORAL EVERY 6 HOURS PRN
Qty: 40 TABLET | Refills: 1 | Status: SHIPPED | OUTPATIENT
Start: 2022-10-27

## 2022-10-27 NOTE — TELEPHONE ENCOUNTER
----- Message from Tasia Scott PA-C sent at 10/27/2022  1:46 PM EDT -----  Spoke with pt regarding CT results and lung mass, I placed an urgent stat referral to pulmonary for eval as well

## 2022-11-01 ENCOUNTER — PREP FOR SURGERY (OUTPATIENT)
Dept: OTHER | Facility: HOSPITAL | Age: 72
End: 2022-11-01

## 2022-11-01 ENCOUNTER — OFFICE VISIT (OUTPATIENT)
Dept: PULMONOLOGY | Facility: CLINIC | Age: 72
End: 2022-11-01

## 2022-11-01 ENCOUNTER — NURSE NAVIGATOR (OUTPATIENT)
Dept: ONCOLOGY | Facility: CLINIC | Age: 72
End: 2022-11-01

## 2022-11-01 VITALS
TEMPERATURE: 97.3 F | HEIGHT: 69 IN | WEIGHT: 170 LBS | HEART RATE: 103 BPM | DIASTOLIC BLOOD PRESSURE: 80 MMHG | BODY MASS INDEX: 25.18 KG/M2 | RESPIRATION RATE: 14 BRPM | OXYGEN SATURATION: 93 % | SYSTOLIC BLOOD PRESSURE: 122 MMHG

## 2022-11-01 DIAGNOSIS — R91.8 LUNG MASS: Primary | ICD-10-CM

## 2022-11-01 DIAGNOSIS — R91.8 MASS OF LINGULA OF LUNG: ICD-10-CM

## 2022-11-01 DIAGNOSIS — J69.0 ASPIRATION PNEUMONIA, UNSPECIFIED ASPIRATION PNEUMONIA TYPE, UNSPECIFIED LATERALITY, UNSPECIFIED PART OF LUNG: ICD-10-CM

## 2022-11-01 DIAGNOSIS — K21.9 GASTROESOPHAGEAL REFLUX DISEASE, UNSPECIFIED WHETHER ESOPHAGITIS PRESENT: ICD-10-CM

## 2022-11-01 DIAGNOSIS — J92.9 PLEURAL THICKENING: ICD-10-CM

## 2022-11-01 DIAGNOSIS — R93.89 ABNORMAL CT OF THE CHEST: Primary | ICD-10-CM

## 2022-11-01 PROCEDURE — 99205 OFFICE O/P NEW HI 60 MIN: CPT | Performed by: INTERNAL MEDICINE

## 2022-11-01 RX ORDER — SODIUM CHLORIDE 9 MG/ML
125 INJECTION, SOLUTION INTRAVENOUS CONTINUOUS
Status: CANCELLED | OUTPATIENT
Start: 2022-11-01

## 2022-11-01 RX ORDER — SODIUM CHLORIDE 0.9 % (FLUSH) 0.9 %
10 SYRINGE (ML) INJECTION AS NEEDED
Status: CANCELLED | OUTPATIENT
Start: 2022-11-01

## 2022-11-01 RX ORDER — SODIUM CHLORIDE 0.9 % (FLUSH) 0.9 %
3 SYRINGE (ML) INJECTION EVERY 12 HOURS SCHEDULED
Status: CANCELLED | OUTPATIENT
Start: 2022-11-01

## 2022-11-01 RX ORDER — LIDOCAINE HYDROCHLORIDE 40 MG/ML
4 INJECTION, SOLUTION RETROBULBAR; TOPICAL ONCE
Status: CANCELLED | OUTPATIENT
Start: 2022-11-01 | End: 2022-11-01

## 2022-11-01 NOTE — H&P (VIEW-ONLY)
Pulmonary Medicine Evaluation    Chief Complaint     New patient evaluation for abnormal CT scan showing left upper lobe (lingula) mass versus pneumonia    History of Present Illness/History Review     Mr. Isaacs is a very nice 72-year-old never smoker with a past history significant for gastroesophageal reflux disease and hypertension he states that he had been in his usual state of health up until about the middle of September when lying in bed he rolled over onto his stomach and believes that he aspirated stomach contents.  This precipitated some choking and coughing as well as shortness of breath.  He states that prior to this he had done this several times before.  He states that he continued to have some cough after that and occasionally felt feverish although does not endorse fevers or chills currently.  This led to a chest x-ray performed on 10/5/2022 which disclosed what appeared to be a left lower lobe infiltrate consistent with pneumonia.  He was suspected to have aspiration pneumonia versus pneumonitis and was treated with a course of moxifloxacin.  He states that he initially got better however this did recur and has continued to have worsening of his symptoms as well as development of left-sided ache as well as pleuritic pain.  He denies any sputum expectoration currently.  He has not had hemoptysis.  His appetite has been decreased over the past several weeks but prior to this he has not had any weight loss.    This led to a repeat chest x-ray on 10/18/2022 which showed persistence of the left lower lobe infiltrate and then was followed with a CT scan of the chest on 10/26/2022.  At some point during this time he was restarted on antibiotics with Levaquin.    I have been able to review the chest x-ray with him.  This discloses an approximately 6 cm mass in the lingula of the left upper lobe with extension to the pleura.  In addition, in the posterior lower areas of the left hemithorax there is some  multilobular pleural thickening without volume loss.  We will mediastinal windows there appears to be an area of central necrosis versus abscess without findings of air in the center of the 6 centimeter mass.  There is a bronchus sign.  Pathologic adenopathy of the left hilum and mediastinum is not appreciated on this study.  He does have a finding of a calcified right lower lobe nodule consistent with prior granulomatous disease.    Patient states that prior to this he really had not had any problems with shortness of breath, chest pain, or weight loss.  He feels that the most recent course of Levaquin has not been helpful and in fact he feels that his symptoms are worsening despite this.  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  No exposure to birds.  No exposure to other industrial toxins or construction materials.      Review of Systems  A full review of systems has been completed and it is negative except as mentioned expressly in HPI and the scanned review sheet    Allergies   Allergen Reactions   • Cardizem [Diltiazem Hcl]    • Penicillins Diarrhea   • Sulfa Antibiotics Rash       Current Outpatient Medications:   •  Diclofenac Sodium (VOLTAREN) 1 % gel gel, Apply 4 g topically to the appropriate area as directed 4 (Four) Times a Day As Needed (musculoskeletal pain)., Disp: 100 g, Rfl: 0  •  finasteride (PROSCAR) 5 MG tablet, Take 1 tablet by mouth Daily., Disp: 90 tablet, Rfl: 3  •  hydroCHLOROthiazide (HYDRODIURIL) 25 MG tablet, Take 1 tablet by mouth Daily., Disp: 90 tablet, Rfl: 3  •  levoFLOXacin (Levaquin) 500 MG tablet, Take 1 tablet by mouth Daily., Disp: 10 tablet, Rfl: 0  •  metoprolol succinate XL (TOPROL-XL) 100 MG 24 hr tablet, TAKE 1 TABLET BY MOUTH EVERY DAY, Disp: 90 tablet, Rfl: 3  •  quinapril (ACCUPRIL) 40 MG tablet, TAKE 1 TABLET BY MOUTH EVERY DAY, Disp: 90 tablet, Rfl: 3  •  simvastatin (ZOCOR) 40 MG tablet, TAKE 1 TABLET BY MOUTH EVERY DAY AT  "NIGHT, Disp: 90 tablet, Rfl: 3  •  traMADol (ULTRAM) 50 MG tablet, Take 1 tablet by mouth Every 6 (Six) Hours As Needed for Moderate Pain., Disp: 40 tablet, Rfl: 1    Past Medical History:   Diagnosis Date   • Acute bronchitis    • Acute prostatitis    • Acute serous otitis media    • Benign prostatic hyperplasia    • Cancer (HCC) September 2015    Basil cell carcinoma   • Cataract    • Colon polyp October 2017   • Diabetes mellitus (HCC) June 1967   • Diverticulosis 2004    Found in first colonoscopy   • Dysuria    • Fatigue    • GERD (gastroesophageal reflux disease) 2016   • Hyperlipidemia    • Hypertension    • Impacted cerumen    • Prostatitis      Family History   Problem Relation Age of Onset   • Alcohol abuse Father    • Hyperlipidemia Mother    • Hypertension Brother      Social History     Tobacco Use   • Smoking status: Never   • Smokeless tobacco: Never   Vaping Use   • Vaping Use: Never used   Substance Use Topics   • Alcohol use: No   • Drug use: No       /80 (BP Location: Right arm, Patient Position: Sitting, Cuff Size: Adult)   Pulse 103   Temp 97.3 °F (36.3 °C) (Infrared)   Resp 14   Ht 175.3 cm (69\")   Wt 77.1 kg (170 lb)   SpO2 93%   BMI 25.10 kg/m²   Physical Exam  Constitutional:       General: He is not in acute distress.     Appearance: Normal appearance. He is not ill-appearing.   HENT:      Mouth/Throat:      Mouth: Mucous membranes are moist.      Pharynx: No oropharyngeal exudate.   Eyes:      General: No scleral icterus.     Conjunctiva/sclera: Conjunctivae normal.      Pupils: Pupils are equal, round, and reactive to light.   Cardiovascular:      Rate and Rhythm: Normal rate and regular rhythm.      Pulses: Normal pulses.      Heart sounds: Normal heart sounds. No murmur heard.  Pulmonary:      Effort: Pulmonary effort is normal. No respiratory distress.      Breath sounds: Normal breath sounds. No stridor. No wheezing, rhonchi or rales.   Chest:      Chest wall: No " tenderness.   Abdominal:      General: Abdomen is flat. Bowel sounds are normal. There is no distension.      Tenderness: There is no abdominal tenderness.   Musculoskeletal:         General: Normal range of motion.      Cervical back: Normal range of motion.      Right lower leg: No edema.      Left lower leg: No edema.   Lymphadenopathy:      Head:      Right side of head: No submental or submandibular adenopathy.      Left side of head: No submental or submandibular adenopathy.      Cervical: No cervical adenopathy.      Right cervical: No superficial cervical adenopathy.     Left cervical: No superficial cervical adenopathy.      Upper Body:      Right upper body: No supraclavicular adenopathy.      Left upper body: No supraclavicular adenopathy.   Skin:     General: Skin is warm.      Capillary Refill: Capillary refill takes less than 2 seconds.   Neurological:      General: No focal deficit present.      Mental Status: He is alert and oriented to person, place, and time.   Psychiatric:         Mood and Affect: Mood normal.         Results     CT scan as per HPI    Problem List       ICD-10-CM ICD-9-CM   1. Abnormal CT of the chest  R93.89 793.2   2. Mass of lingula of lung  R91.8 786.6   3. Aspiration pneumonia, possible  J69.0 507.0   4. Gastroesophageal reflux disease, unspecified whether esophagitis present  K21.9 530.81   5. Pleural thickening  J92.9 511.0       Impression and Plan     Mr. Isaacs presents with almost 2 months post an aspiration event in the middle of September with complaints of worsening respiratory symptoms including pleuritic pain, cough, and now some decreased appetite.  The chest imaging is very concerning for primary bronchogenic carcinoma however there are some characteristics of this which could suggest aspiration pneumonia and possibly even lung abscess.  He was asymptomatic prior to this aspiration event.  Unfortunately we do not have imaging prior to that to review.  Overall he  does not appear toxic but this could be simply smoldering infection which is partially treated.  I did explain to him that typically stomach bugs, particularly anaerobes, would not necessarily be fully treated by oral antibiotics.  That said, I cannot rule out the possibility that we are dealing with cancer at this point and I feel that observation is not a reasonable approach.  The patient and his wife agree.    I have suggested bronchoscopy for airway examination, lavage for cultures, and possibly transbronchial lung biopsy for tissue diagnosis of this possible mass.  I have explained the risk, benefits and alternatives to him and he has agreed to proceed with this plan at the earliest opportunity.    To that end, I would like to proceed with bronchoscopy under general anesthesia and at that time we will be able to do both a lavage of the left upper lobe as well as proceed with transbronchial lung biopsies of the mass.  I will also plan at that time to perform endobronchial ultrasound to survey the mediastinum and danna.    At this point it is unclear to me what is causing the pleural reaction in the posterior left hemithorax.  This could be related to possible infection but could also be related to malignancy.  Certainly I believe that the mass and this thickening is related.    We will set him up at the earliest opportunity for bronchoscopy and make further plans after we have a bit more information.    Thank you very much for asking me to help in the care of this very nice patient.    I spent a total of 65 minutes with Mr. Isaacs with greater than 70% that time spent consultation regarding his diagnoses, review the data, and formulation of the plan moving forward.  Jeremias Hughes MD, [facility]    CC: Elieser Delvalle MD

## 2022-11-01 NOTE — PROGRESS NOTES
Pulmonary Medicine Evaluation    Chief Complaint     New patient evaluation for abnormal CT scan showing left upper lobe (lingula) mass versus pneumonia    History of Present Illness/History Review     Mr. Isaacs is a very nice 72-year-old never smoker with a past history significant for gastroesophageal reflux disease and hypertension he states that he had been in his usual state of health up until about the middle of September when lying in bed he rolled over onto his stomach and believes that he aspirated stomach contents.  This precipitated some choking and coughing as well as shortness of breath.  He states that prior to this he had done this several times before.  He states that he continued to have some cough after that and occasionally felt feverish although does not endorse fevers or chills currently.  This led to a chest x-ray performed on 10/5/2022 which disclosed what appeared to be a left lower lobe infiltrate consistent with pneumonia.  He was suspected to have aspiration pneumonia versus pneumonitis and was treated with a course of moxifloxacin.  He states that he initially got better however this did recur and has continued to have worsening of his symptoms as well as development of left-sided ache as well as pleuritic pain.  He denies any sputum expectoration currently.  He has not had hemoptysis.  His appetite has been decreased over the past several weeks but prior to this he has not had any weight loss.    This led to a repeat chest x-ray on 10/18/2022 which showed persistence of the left lower lobe infiltrate and then was followed with a CT scan of the chest on 10/26/2022.  At some point during this time he was restarted on antibiotics with Levaquin.    I have been able to review the chest x-ray with him.  This discloses an approximately 6 cm mass in the lingula of the left upper lobe with extension to the pleura.  In addition, in the posterior lower areas of the left hemithorax there is some  multilobular pleural thickening without volume loss.  We will mediastinal windows there appears to be an area of central necrosis versus abscess without findings of air in the center of the 6 centimeter mass.  There is a bronchus sign.  Pathologic adenopathy of the left hilum and mediastinum is not appreciated on this study.  He does have a finding of a calcified right lower lobe nodule consistent with prior granulomatous disease.    Patient states that prior to this he really had not had any problems with shortness of breath, chest pain, or weight loss.  He feels that the most recent course of Levaquin has not been helpful and in fact he feels that his symptoms are worsening despite this.  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  No exposure to birds.  No exposure to other industrial toxins or construction materials.      Review of Systems  A full review of systems has been completed and it is negative except as mentioned expressly in HPI and the scanned review sheet    Allergies   Allergen Reactions   • Cardizem [Diltiazem Hcl]    • Penicillins Diarrhea   • Sulfa Antibiotics Rash       Current Outpatient Medications:   •  Diclofenac Sodium (VOLTAREN) 1 % gel gel, Apply 4 g topically to the appropriate area as directed 4 (Four) Times a Day As Needed (musculoskeletal pain)., Disp: 100 g, Rfl: 0  •  finasteride (PROSCAR) 5 MG tablet, Take 1 tablet by mouth Daily., Disp: 90 tablet, Rfl: 3  •  hydroCHLOROthiazide (HYDRODIURIL) 25 MG tablet, Take 1 tablet by mouth Daily., Disp: 90 tablet, Rfl: 3  •  levoFLOXacin (Levaquin) 500 MG tablet, Take 1 tablet by mouth Daily., Disp: 10 tablet, Rfl: 0  •  metoprolol succinate XL (TOPROL-XL) 100 MG 24 hr tablet, TAKE 1 TABLET BY MOUTH EVERY DAY, Disp: 90 tablet, Rfl: 3  •  quinapril (ACCUPRIL) 40 MG tablet, TAKE 1 TABLET BY MOUTH EVERY DAY, Disp: 90 tablet, Rfl: 3  •  simvastatin (ZOCOR) 40 MG tablet, TAKE 1 TABLET BY MOUTH EVERY DAY AT  "NIGHT, Disp: 90 tablet, Rfl: 3  •  traMADol (ULTRAM) 50 MG tablet, Take 1 tablet by mouth Every 6 (Six) Hours As Needed for Moderate Pain., Disp: 40 tablet, Rfl: 1    Past Medical History:   Diagnosis Date   • Acute bronchitis    • Acute prostatitis    • Acute serous otitis media    • Benign prostatic hyperplasia    • Cancer (HCC) September 2015    Basil cell carcinoma   • Cataract    • Colon polyp October 2017   • Diabetes mellitus (HCC) June 1967   • Diverticulosis 2004    Found in first colonoscopy   • Dysuria    • Fatigue    • GERD (gastroesophageal reflux disease) 2016   • Hyperlipidemia    • Hypertension    • Impacted cerumen    • Prostatitis      Family History   Problem Relation Age of Onset   • Alcohol abuse Father    • Hyperlipidemia Mother    • Hypertension Brother      Social History     Tobacco Use   • Smoking status: Never   • Smokeless tobacco: Never   Vaping Use   • Vaping Use: Never used   Substance Use Topics   • Alcohol use: No   • Drug use: No       /80 (BP Location: Right arm, Patient Position: Sitting, Cuff Size: Adult)   Pulse 103   Temp 97.3 °F (36.3 °C) (Infrared)   Resp 14   Ht 175.3 cm (69\")   Wt 77.1 kg (170 lb)   SpO2 93%   BMI 25.10 kg/m²   Physical Exam  Constitutional:       General: He is not in acute distress.     Appearance: Normal appearance. He is not ill-appearing.   HENT:      Mouth/Throat:      Mouth: Mucous membranes are moist.      Pharynx: No oropharyngeal exudate.   Eyes:      General: No scleral icterus.     Conjunctiva/sclera: Conjunctivae normal.      Pupils: Pupils are equal, round, and reactive to light.   Cardiovascular:      Rate and Rhythm: Normal rate and regular rhythm.      Pulses: Normal pulses.      Heart sounds: Normal heart sounds. No murmur heard.  Pulmonary:      Effort: Pulmonary effort is normal. No respiratory distress.      Breath sounds: Normal breath sounds. No stridor. No wheezing, rhonchi or rales.   Chest:      Chest wall: No " tenderness.   Abdominal:      General: Abdomen is flat. Bowel sounds are normal. There is no distension.      Tenderness: There is no abdominal tenderness.   Musculoskeletal:         General: Normal range of motion.      Cervical back: Normal range of motion.      Right lower leg: No edema.      Left lower leg: No edema.   Lymphadenopathy:      Head:      Right side of head: No submental or submandibular adenopathy.      Left side of head: No submental or submandibular adenopathy.      Cervical: No cervical adenopathy.      Right cervical: No superficial cervical adenopathy.     Left cervical: No superficial cervical adenopathy.      Upper Body:      Right upper body: No supraclavicular adenopathy.      Left upper body: No supraclavicular adenopathy.   Skin:     General: Skin is warm.      Capillary Refill: Capillary refill takes less than 2 seconds.   Neurological:      General: No focal deficit present.      Mental Status: He is alert and oriented to person, place, and time.   Psychiatric:         Mood and Affect: Mood normal.         Results     CT scan as per HPI    Problem List       ICD-10-CM ICD-9-CM   1. Abnormal CT of the chest  R93.89 793.2   2. Mass of lingula of lung  R91.8 786.6   3. Aspiration pneumonia, possible  J69.0 507.0   4. Gastroesophageal reflux disease, unspecified whether esophagitis present  K21.9 530.81   5. Pleural thickening  J92.9 511.0       Impression and Plan     Mr. Isaacs presents with almost 2 months post an aspiration event in the middle of September with complaints of worsening respiratory symptoms including pleuritic pain, cough, and now some decreased appetite.  The chest imaging is very concerning for primary bronchogenic carcinoma however there are some characteristics of this which could suggest aspiration pneumonia and possibly even lung abscess.  He was asymptomatic prior to this aspiration event.  Unfortunately we do not have imaging prior to that to review.  Overall he  does not appear toxic but this could be simply smoldering infection which is partially treated.  I did explain to him that typically stomach bugs, particularly anaerobes, would not necessarily be fully treated by oral antibiotics.  That said, I cannot rule out the possibility that we are dealing with cancer at this point and I feel that observation is not a reasonable approach.  The patient and his wife agree.    I have suggested bronchoscopy for airway examination, lavage for cultures, and possibly transbronchial lung biopsy for tissue diagnosis of this possible mass.  I have explained the risk, benefits and alternatives to him and he has agreed to proceed with this plan at the earliest opportunity.    To that end, I would like to proceed with bronchoscopy under general anesthesia and at that time we will be able to do both a lavage of the left upper lobe as well as proceed with transbronchial lung biopsies of the mass.  I will also plan at that time to perform endobronchial ultrasound to survey the mediastinum and danna.    At this point it is unclear to me what is causing the pleural reaction in the posterior left hemithorax.  This could be related to possible infection but could also be related to malignancy.  Certainly I believe that the mass and this thickening is related.    We will set him up at the earliest opportunity for bronchoscopy and make further plans after we have a bit more information.    Thank you very much for asking me to help in the care of this very nice patient.    I spent a total of 65 minutes with Mr. Isaacs with greater than 70% that time spent consultation regarding his diagnoses, review the data, and formulation of the plan moving forward.  Jeremias Hughes MD, [facility]    CC: Elieser Delvalle MD

## 2022-11-01 NOTE — PROGRESS NOTES
Met patient and wife in lung nodule clinic with Dr. Hughes. He is retired from [employer] as a . Reports early September he slept on his stomach and woke with terrible coughing and aspirating stomach contents. Since then he has been on 2 rounds of antibiotics and has experienced chest wall pain as well as cough. He did have low grade fever right after episode but denies any since. Wife reports he is not improving despite antibiotics and in fact is worsening. CT chest obtained revealing 6.3cm DIEGO mass and mediastinal adenopathy. Scans reviewed. Per Dr. Hughes bronch now. He v/u and agreeable to plan. Introduced lung navigator role and provided contact information. He knows to call with questions or concerns.

## 2022-11-07 ENCOUNTER — PRE-ADMISSION TESTING (OUTPATIENT)
Dept: PREADMISSION TESTING | Facility: HOSPITAL | Age: 72
End: 2022-11-07

## 2022-11-07 VITALS — WEIGHT: 161.16 LBS | HEIGHT: 69 IN | BODY MASS INDEX: 23.87 KG/M2

## 2022-11-07 DIAGNOSIS — R91.8 LUNG MASS: ICD-10-CM

## 2022-11-07 LAB
ALBUMIN SERPL-MCNC: 2.7 G/DL (ref 3.5–5.2)
ALBUMIN/GLOB SERPL: 0.5 G/DL
ALP SERPL-CCNC: 269 U/L (ref 39–117)
ALT SERPL W P-5'-P-CCNC: 29 U/L (ref 1–41)
ANION GAP SERPL CALCULATED.3IONS-SCNC: 8 MMOL/L (ref 5–15)
APTT PPP: 38.3 SECONDS (ref 22–39)
AST SERPL-CCNC: 28 U/L (ref 1–40)
BASOPHILS # BLD AUTO: 0.04 10*3/MM3 (ref 0–0.2)
BASOPHILS NFR BLD AUTO: 0.2 % (ref 0–1.5)
BILIRUB SERPL-MCNC: 0.7 MG/DL (ref 0–1.2)
BUN SERPL-MCNC: 22 MG/DL (ref 8–23)
BUN/CREAT SERPL: 24.4 (ref 7–25)
CALCIUM SPEC-SCNC: 9.8 MG/DL (ref 8.6–10.5)
CHLORIDE SERPL-SCNC: 82 MMOL/L (ref 98–107)
CO2 SERPL-SCNC: 32 MMOL/L (ref 22–29)
CREAT SERPL-MCNC: 0.9 MG/DL (ref 0.76–1.27)
DEPRECATED RDW RBC AUTO: 45.1 FL (ref 37–54)
EGFRCR SERPLBLD CKD-EPI 2021: 90.2 ML/MIN/1.73
EOSINOPHIL # BLD AUTO: 0 10*3/MM3 (ref 0–0.4)
EOSINOPHIL NFR BLD AUTO: 0 % (ref 0.3–6.2)
ERYTHROCYTE [DISTWIDTH] IN BLOOD BY AUTOMATED COUNT: 15.2 % (ref 12.3–15.4)
GLOBULIN UR ELPH-MCNC: 5.4 GM/DL
GLUCOSE SERPL-MCNC: 175 MG/DL (ref 65–99)
HCT VFR BLD AUTO: 30.5 % (ref 37.5–51)
HGB BLD-MCNC: 9.8 G/DL (ref 13–17.7)
IMM GRANULOCYTES # BLD AUTO: 0.53 10*3/MM3 (ref 0–0.05)
IMM GRANULOCYTES NFR BLD AUTO: 2.2 % (ref 0–0.5)
INR PPP: 1.35 (ref 0.84–1.13)
LYMPHOCYTES # BLD AUTO: 1.81 10*3/MM3 (ref 0.7–3.1)
LYMPHOCYTES NFR BLD AUTO: 7.7 % (ref 19.6–45.3)
MCH RBC QN AUTO: 26.1 PG (ref 26.6–33)
MCHC RBC AUTO-ENTMCNC: 32.1 G/DL (ref 31.5–35.7)
MCV RBC AUTO: 81.1 FL (ref 79–97)
MONOCYTES # BLD AUTO: 2.8 10*3/MM3 (ref 0.1–0.9)
MONOCYTES NFR BLD AUTO: 11.9 % (ref 5–12)
NEUTROPHILS NFR BLD AUTO: 18.39 10*3/MM3 (ref 1.7–7)
NEUTROPHILS NFR BLD AUTO: 78 % (ref 42.7–76)
NRBC BLD AUTO-RTO: 0 /100 WBC (ref 0–0.2)
PLATELET # BLD AUTO: 540 10*3/MM3 (ref 140–450)
PMV BLD AUTO: 9.4 FL (ref 6–12)
POTASSIUM SERPL-SCNC: 3.1 MMOL/L (ref 3.5–5.2)
PROT SERPL-MCNC: 8.1 G/DL (ref 6–8.5)
PROTHROMBIN TIME: 16.6 SECONDS (ref 11.4–14.4)
QT INTERVAL: 364 MS
QTC INTERVAL: 440 MS
RBC # BLD AUTO: 3.76 10*6/MM3 (ref 4.14–5.8)
SODIUM SERPL-SCNC: 122 MMOL/L (ref 136–145)
WBC NRBC COR # BLD: 23.57 10*3/MM3 (ref 3.4–10.8)

## 2022-11-07 PROCEDURE — 85025 COMPLETE CBC W/AUTO DIFF WBC: CPT

## 2022-11-07 PROCEDURE — 93005 ELECTROCARDIOGRAM TRACING: CPT

## 2022-11-07 PROCEDURE — 93010 ELECTROCARDIOGRAM REPORT: CPT | Performed by: INTERNAL MEDICINE

## 2022-11-07 PROCEDURE — 85730 THROMBOPLASTIN TIME PARTIAL: CPT

## 2022-11-07 PROCEDURE — 80053 COMPREHEN METABOLIC PANEL: CPT

## 2022-11-07 PROCEDURE — 85610 PROTHROMBIN TIME: CPT

## 2022-11-07 PROCEDURE — 36415 COLL VENOUS BLD VENIPUNCTURE: CPT

## 2022-11-07 NOTE — PAT
Patient viewed general PAT education video as instructed in their preoperative information received from their surgeon.  Patient stated the general PAT education video was viewed in its entirety and survey completed.  Copies of [facility] general education handouts (Incentive Spirometry, Meds to Beds Program, Patient Belongings, Pre-op skin preparation instructions, Blood Glucose testing, Visitor policy, Surgery FAQ, Code H) distributed to patient if not printed. Education related to the PAT pass and skin preparation for surgery (if applicable) completed in PAT as a reinforcement to PAT education video. Patient instructed to return PAT pass provided today as well as completed skin preparation sheet (if applicable) on the day of procedure.     Additionally if patient had not viewed video yet but intended to view it at home or in our waiting area, then referred them to the handout with QR code/link provided during PAT visit.  Instructed patient to complete survey after viewing the video in its entirety.  Encouraged patient/family to read [facility] general education handouts thoroughly and notify PAT staff with any questions or concerns. Patient verbalized understanding of all information and priority content.    Per Anesthesia Request, patient instructed not to take their ACE/ARB medications on the AM of surgery.

## 2022-11-09 ENCOUNTER — APPOINTMENT (OUTPATIENT)
Dept: GENERAL RADIOLOGY | Facility: HOSPITAL | Age: 72
End: 2022-11-09

## 2022-11-09 ENCOUNTER — ANESTHESIA EVENT (OUTPATIENT)
Dept: GASTROENTEROLOGY | Facility: HOSPITAL | Age: 72
End: 2022-11-09

## 2022-11-09 ENCOUNTER — ANESTHESIA (OUTPATIENT)
Dept: GASTROENTEROLOGY | Facility: HOSPITAL | Age: 72
End: 2022-11-09

## 2022-11-09 ENCOUNTER — HOSPITAL ENCOUNTER (OUTPATIENT)
Facility: HOSPITAL | Age: 72
Setting detail: HOSPITAL OUTPATIENT SURGERY
Discharge: HOME | End: 2022-11-09
Attending: INTERNAL MEDICINE | Admitting: INTERNAL MEDICINE

## 2022-11-09 VITALS
RESPIRATION RATE: 20 BRPM | OXYGEN SATURATION: 89 % | TEMPERATURE: 97.5 F | DIASTOLIC BLOOD PRESSURE: 70 MMHG | HEART RATE: 106 BPM | SYSTOLIC BLOOD PRESSURE: 112 MMHG

## 2022-11-09 DIAGNOSIS — R91.8 LUNG MASS: ICD-10-CM

## 2022-11-09 LAB — GLUCOSE BLDC GLUCOMTR-MCNC: 142 MG/DL (ref 70–130)

## 2022-11-09 PROCEDURE — 25010000002 PROPOFOL 10 MG/ML EMULSION: Performed by: NURSE ANESTHETIST, CERTIFIED REGISTERED

## 2022-11-09 PROCEDURE — 87205 SMEAR GRAM STAIN: CPT | Performed by: INTERNAL MEDICINE

## 2022-11-09 PROCEDURE — 25010000002 DEXAMETHASONE PER 1 MG: Performed by: NURSE ANESTHETIST, CERTIFIED REGISTERED

## 2022-11-09 PROCEDURE — C1726 CATH, BAL DIL, NON-VASCULAR: HCPCS | Performed by: INTERNAL MEDICINE

## 2022-11-09 PROCEDURE — 82962 GLUCOSE BLOOD TEST: CPT

## 2022-11-09 PROCEDURE — 71045 X-RAY EXAM CHEST 1 VIEW: CPT

## 2022-11-09 PROCEDURE — 76000 FLUOROSCOPY <1 HR PHYS/QHP: CPT

## 2022-11-09 PROCEDURE — 87116 MYCOBACTERIA CULTURE: CPT | Performed by: INTERNAL MEDICINE

## 2022-11-09 PROCEDURE — 88305 TISSUE EXAM BY PATHOLOGIST: CPT | Performed by: INTERNAL MEDICINE

## 2022-11-09 PROCEDURE — 87070 CULTURE OTHR SPECIMN AEROBIC: CPT | Performed by: INTERNAL MEDICINE

## 2022-11-09 PROCEDURE — 31628 BRONCHOSCOPY/LUNG BX EACH: CPT | Performed by: INTERNAL MEDICINE

## 2022-11-09 PROCEDURE — 87102 FUNGUS ISOLATION CULTURE: CPT | Performed by: INTERNAL MEDICINE

## 2022-11-09 PROCEDURE — 94640 AIRWAY INHALATION TREATMENT: CPT

## 2022-11-09 PROCEDURE — 31654 BRONCH EBUS IVNTJ PERPH LES: CPT | Performed by: INTERNAL MEDICINE

## 2022-11-09 PROCEDURE — 31652 BRONCH EBUS SAMPLNG 1/2 NODE: CPT | Performed by: INTERNAL MEDICINE

## 2022-11-09 PROCEDURE — 87206 SMEAR FLUORESCENT/ACID STAI: CPT | Performed by: INTERNAL MEDICINE

## 2022-11-09 PROCEDURE — 25010000002 ONDANSETRON PER 1 MG: Performed by: NURSE ANESTHETIST, CERTIFIED REGISTERED

## 2022-11-09 PROCEDURE — 88312 SPECIAL STAINS GROUP 1: CPT | Performed by: INTERNAL MEDICINE

## 2022-11-09 PROCEDURE — 94799 UNLISTED PULMONARY SVC/PX: CPT

## 2022-11-09 RX ORDER — ROCURONIUM BROMIDE 10 MG/ML
INJECTION, SOLUTION INTRAVENOUS AS NEEDED
Status: DISCONTINUED | OUTPATIENT
Start: 2022-11-09 | End: 2022-11-09 | Stop reason: SURG

## 2022-11-09 RX ORDER — FAMOTIDINE 20 MG/1
20 TABLET, FILM COATED ORAL ONCE
Status: CANCELLED | OUTPATIENT
Start: 2022-11-09 | End: 2022-11-09

## 2022-11-09 RX ORDER — SODIUM CHLORIDE 0.9 % (FLUSH) 0.9 %
10 SYRINGE (ML) INJECTION AS NEEDED
Status: DISCONTINUED | OUTPATIENT
Start: 2022-11-09 | End: 2022-11-18 | Stop reason: HOSPADM

## 2022-11-09 RX ORDER — PHENYLEPHRINE HCL IN 0.9% NACL 1 MG/10 ML
SYRINGE (ML) INTRAVENOUS AS NEEDED
Status: DISCONTINUED | OUTPATIENT
Start: 2022-11-09 | End: 2022-11-09 | Stop reason: SURG

## 2022-11-09 RX ORDER — ONDANSETRON 2 MG/ML
INJECTION INTRAMUSCULAR; INTRAVENOUS AS NEEDED
Status: DISCONTINUED | OUTPATIENT
Start: 2022-11-09 | End: 2022-11-09 | Stop reason: SURG

## 2022-11-09 RX ORDER — LIDOCAINE HYDROCHLORIDE 10 MG/ML
INJECTION, SOLUTION EPIDURAL; INFILTRATION; INTRACAUDAL; PERINEURAL AS NEEDED
Status: DISCONTINUED | OUTPATIENT
Start: 2022-11-09 | End: 2022-11-09 | Stop reason: SURG

## 2022-11-09 RX ORDER — LIDOCAINE HYDROCHLORIDE 10 MG/ML
0.5 INJECTION, SOLUTION EPIDURAL; INFILTRATION; INTRACAUDAL; PERINEURAL ONCE AS NEEDED
Status: CANCELLED | OUTPATIENT
Start: 2022-11-09

## 2022-11-09 RX ORDER — DEXAMETHASONE SODIUM PHOSPHATE 4 MG/ML
INJECTION, SOLUTION INTRA-ARTICULAR; INTRALESIONAL; INTRAMUSCULAR; INTRAVENOUS; SOFT TISSUE AS NEEDED
Status: DISCONTINUED | OUTPATIENT
Start: 2022-11-09 | End: 2022-11-09 | Stop reason: SURG

## 2022-11-09 RX ORDER — FAMOTIDINE 10 MG/ML
20 INJECTION, SOLUTION INTRAVENOUS ONCE
Status: COMPLETED | OUTPATIENT
Start: 2022-11-09 | End: 2022-11-09

## 2022-11-09 RX ORDER — PROPOFOL 10 MG/ML
VIAL (ML) INTRAVENOUS AS NEEDED
Status: DISCONTINUED | OUTPATIENT
Start: 2022-11-09 | End: 2022-11-09 | Stop reason: SURG

## 2022-11-09 RX ORDER — SODIUM CHLORIDE, SODIUM LACTATE, POTASSIUM CHLORIDE, CALCIUM CHLORIDE 600; 310; 30; 20 MG/100ML; MG/100ML; MG/100ML; MG/100ML
9 INJECTION, SOLUTION INTRAVENOUS CONTINUOUS
Status: DISCONTINUED | OUTPATIENT
Start: 2022-11-09 | End: 2022-11-18 | Stop reason: HOSPADM

## 2022-11-09 RX ORDER — ONDANSETRON 2 MG/ML
4 INJECTION INTRAMUSCULAR; INTRAVENOUS ONCE AS NEEDED
Status: DISCONTINUED | OUTPATIENT
Start: 2022-11-09 | End: 2022-11-18 | Stop reason: HOSPADM

## 2022-11-09 RX ORDER — SODIUM CHLORIDE 9 MG/ML
125 INJECTION, SOLUTION INTRAVENOUS CONTINUOUS
Status: DISCONTINUED | OUTPATIENT
Start: 2022-11-09 | End: 2022-11-18 | Stop reason: HOSPADM

## 2022-11-09 RX ORDER — SODIUM CHLORIDE 0.9 % (FLUSH) 0.9 %
10 SYRINGE (ML) INJECTION EVERY 12 HOURS SCHEDULED
Status: CANCELLED | OUTPATIENT
Start: 2022-11-09

## 2022-11-09 RX ORDER — MIDAZOLAM HYDROCHLORIDE 1 MG/ML
0.5 INJECTION INTRAMUSCULAR; INTRAVENOUS
Status: DISCONTINUED | OUTPATIENT
Start: 2022-11-09 | End: 2022-11-18 | Stop reason: HOSPADM

## 2022-11-09 RX ORDER — BENZONATATE 100 MG/1
100 CAPSULE ORAL 3 TIMES DAILY PRN
Qty: 42 CAPSULE | Refills: 3 | Status: SHIPPED | OUTPATIENT
Start: 2022-11-09

## 2022-11-09 RX ORDER — IPRATROPIUM BROMIDE AND ALBUTEROL SULFATE 2.5; .5 MG/3ML; MG/3ML
3 SOLUTION RESPIRATORY (INHALATION) ONCE AS NEEDED
Status: COMPLETED | OUTPATIENT
Start: 2022-11-09 | End: 2022-11-09

## 2022-11-09 RX ADMIN — Medication 10 ML: at 14:11

## 2022-11-09 RX ADMIN — ONDANSETRON 4 MG: 2 INJECTION INTRAMUSCULAR; INTRAVENOUS at 15:36

## 2022-11-09 RX ADMIN — FAMOTIDINE 20 MG: 10 INJECTION INTRAVENOUS at 14:12

## 2022-11-09 RX ADMIN — Medication 100 MCG: at 15:35

## 2022-11-09 RX ADMIN — ROCURONIUM BROMIDE 40 MG: 10 INJECTION INTRAVENOUS at 15:16

## 2022-11-09 RX ADMIN — IPRATROPIUM BROMIDE AND ALBUTEROL SULFATE 3 ML: 2.5; .5 SOLUTION RESPIRATORY (INHALATION) at 16:31

## 2022-11-09 RX ADMIN — DEXAMETHASONE SODIUM PHOSPHATE 4 MG: 4 INJECTION, SOLUTION INTRA-ARTICULAR; INTRALESIONAL; INTRAMUSCULAR; INTRAVENOUS; SOFT TISSUE at 15:22

## 2022-11-09 RX ADMIN — PROPOFOL 150 MG: 10 INJECTION, EMULSION INTRAVENOUS at 15:16

## 2022-11-09 RX ADMIN — SODIUM CHLORIDE, POTASSIUM CHLORIDE, SODIUM LACTATE AND CALCIUM CHLORIDE 9 ML/HR: 600; 310; 30; 20 INJECTION, SOLUTION INTRAVENOUS at 14:11

## 2022-11-09 RX ADMIN — LIDOCAINE HYDROCHLORIDE 50 MG: 10 INJECTION, SOLUTION EPIDURAL; INFILTRATION; INTRACAUDAL; PERINEURAL at 15:16

## 2022-11-09 NOTE — OP NOTE
Bronchoscopy Procedural Note       Date of Operation: 11/9/2022    Indication: This is a 72 y.o. patient who has a history of aspiration pneumonia and was found to have a large mass in the left lower lobe.  He is here today for endobronchial examination via bronchoscopy and hopefully tissue diagnosis.    Pre-op Diagnosis: Left lower lobe lung mass    Post-op Diagnosis: Same    Surgeon: Jeremias Hughes MD    Procedures Performed:  Flexible fiberoptic bronchoscopy  Bronchial washings  Linear endobronchial ultrasound with transbronchial needle aspiration of station 7  Radial endobronchial ultrasound of peripheral lesion left lower lobe  Transbronchial lung biopsy left lower lobe under fluoroscopy    Anesthesia: General    Estimated Blood Loss: <15 ml    Description of Procedure:  After obtaining informed consent and completing a procedural pause, the flexible fiberoptic bronchoscope was passed without difficulty through the pre-existing endotracheal tube.  The tube is approximately 5 cm above the yohana.  The yohana splayed slightly to the right.  I first proceeded to the right side and surveyed the right upper, the right middle, and right lower lobe.  There are minimal retained secretions primarily in the right middle lobe which were easily suctioned.  No endobronchial lesions are noted.  I then proceeded to the left side and encountered some yellow and white secretions mainly pooling in the left lower lobe as well as the left mainstem.  These were suctioned without difficulty.  I surveyed the left upper, the lingula, and the left lower lobes.  No endobronchial lesions were noted to the level of the subsegments.    I then switched to the linear ultrasound scope and surveyed the bilateral danna and mediastinum.  There was an approximately 1 cm subcarinal soft tissue density consistent with lymph node and I was able to take multiple transbronchial needle aspirations of this.  Hemostasis was good.  There was an  approximately 1 cm soft tissue density at approximately station 10 L/11 L however this was not accessible due to an overlying pulmonary artery.  No other acceptable targets were noted.    The scope was switched out for the light scope and I proceeded to the basal segments of the left lower lobe.  Utilizing fluoroscopy as well as radial endobronchial ultrasound to confirm the location of the left lower lobe lung mass, I proceeded to take multiple transbronchial lung biopsies of said mass with good return of material.  Hemostasis was good.  Washings were also taken of this area and sent for cytological and microbiological studies.  The airway was suctioned clean and the patient was turned over to anesthesia for postoperative care.    There were no immediate complications.  Postoperative chest film did not show any sign of pneumothorax.  I was able to discuss the case with the patient's wife and daughter.    Findings and Recommendations:  We will await studies including transbronchial lung biopsies as well as cultures.  I am still concerned that this could represent primary bronchogenic carcinoma due to the patient's weight loss and the appearance on imaging though I believe it is still possible we are dealing with indolent infection.  I did discuss this with his family and I have let them know that I will give him a call as soon as I have information available and we will make further plans.    I am going to go ahead and send the patient home with a prescription for Tessalon Perles to be taken on an as-needed basis to control his cough.      Jeremias Hughes MD, [facility]  Pulmonary and Critical Care Medicine   11/09/22 16:34 EST

## 2022-11-09 NOTE — ANESTHESIA PREPROCEDURE EVALUATION
Anesthesia Evaluation     Patient summary reviewed and Nursing notes reviewed   NPO Solid Status: > 8 hours  NPO Liquid Status: > 8 hours           Airway   Mallampati: II  TM distance: >3 FB  Neck ROM: full  No difficulty expected  Dental - normal exam     Pulmonary     breath sounds clear to auscultation  Cardiovascular   Exercise tolerance: good (4-7 METS)    Rhythm: regular  Rate: normal        Neuro/Psych  GI/Hepatic/Renal/Endo      Musculoskeletal     Abdominal    Substance History      OB/GYN          Other                        Anesthesia Plan    ASA 2     general     intravenous induction           CODE STATUS:

## 2022-11-09 NOTE — ANESTHESIA PROCEDURE NOTES
Airway  Urgency: elective    Date/Time: 11/9/2022 3:18 PM  Airway not difficult    General Information and Staff    Patient location during procedure: OR  CRNA/CAA: Guerrero Garcia CRNA    Indications and Patient Condition  Indications for airway management: airway protection    Preoxygenated: yes  MILS not maintained throughout  Mask difficulty assessment: 1 - vent by mask    Final Airway Details  Final airway type: endotracheal airway      Successful airway: ETT  Cuffed: yes   Successful intubation technique: direct laryngoscopy  Facilitating devices/methods: Bougie  Endotracheal tube insertion site: oral  Blade: Cardenas  Blade size: 2  ETT size (mm): 8.5  Cormack-Lehane Classification: grade IIa - partial view of glottis  Placement verified by: chest auscultation and capnometry   Measured from: lips  ETT/EBT  to lips (cm): 20  Number of attempts at approach: 1  Assessment: lips, teeth, and gum same as pre-op and atraumatic intubation    Additional Comments  Negative epigastric sounds, Breath sound equal bilaterally with symmetric chest rise and fall

## 2022-11-09 NOTE — ANESTHESIA POSTPROCEDURE EVALUATION
Patient: Rhett Isaacs    Procedure Summary     Date: 11/09/22 [facility] / [facility]:  [facility]    Anesthesia Start: 1509 Anesthesia Stop: 1611    Procedure: BRONCHOSCOPY WITH ENDOBRONCHIAL ULTRASOUND WITH FLUOROSCOPY (Bronchus) Diagnosis:       Lung mass      (Lung mass [R91.8])    Surgeons: Jeremias Hughes MD Provider: Ronaldo Sandoval MD    Anesthesia Type: general ASA Status: 2          Anesthesia Type: general    Vitals  Vitals Value Taken Time   /70 11/09/22 1655   Temp 97.5 °F (36.4 °C) 11/09/22 1610   Pulse 106 11/09/22 1659   Resp 20 11/09/22 1645   SpO2 89 % 11/09/22 1703           Post Anesthesia Care and Evaluation    Patient location during evaluation: PACU  Patient participation: complete - patient participated  Level of consciousness: awake and alert  Pain management: adequate    Airway patency: patent  Anesthetic complications: No anesthetic complications  PONV Status: none  Cardiovascular status: hemodynamically stable and acceptable  Respiratory status: nonlabored ventilation, acceptable and nasal cannula  Hydration status: acceptable

## 2022-11-09 NOTE — INTERVAL H&P NOTE
No change the patient's physical examination as attached.  Patient states that he has not had any fevers or chills.  He has had a nagging cough recently.  He also feels like he is continuing to lose weight and really does not have much in the way of an appetite.  I have been able to answer all of his questions to the best of my ability.  We will plan to proceed with bronchoscopy.    Electronically signed by Jeremias Hughes MD, 11/09/22, 3:02 PM EST.

## 2022-11-10 LAB — GIE STN SPEC: NORMAL

## 2022-11-11 LAB
BACTERIA SPEC RESP CULT: NORMAL
GRAM STN SPEC: NORMAL
REF LAB TEST METHOD: NORMAL

## 2022-11-15 ENCOUNTER — TELEPHONE (OUTPATIENT)
Dept: FAMILY MEDICINE CLINIC | Facility: CLINIC | Age: 72
End: 2022-11-15

## 2022-11-15 ENCOUNTER — PREP FOR SURGERY (OUTPATIENT)
Dept: OTHER | Facility: HOSPITAL | Age: 72
End: 2022-11-15

## 2022-11-15 ENCOUNTER — TELEPHONE (OUTPATIENT)
Dept: PULMONOLOGY | Facility: CLINIC | Age: 72
End: 2022-11-15

## 2022-11-15 DIAGNOSIS — R91.8 LUNG MASS: Primary | ICD-10-CM

## 2022-11-15 LAB
CYTO UR: NORMAL
LAB AP CASE REPORT: NORMAL
LAB AP CLINICAL INFORMATION: NORMAL
LAB AP DIAGNOSIS COMMENT: NORMAL
LAB AP SPECIAL STAINS: NORMAL
PATH REPORT.FINAL DX SPEC: NORMAL
PATH REPORT.GROSS SPEC: NORMAL

## 2022-11-15 NOTE — TELEPHONE ENCOUNTER
Caller: Rhett Isaacs JANET MCGUIRE    Relationship: Self    Best call back number: PATIENT: [phone] OR WIFE:[phone]-OK TO LEAVE DETAILED MESSAGE IF NO ANSWER    Caller requesting test results: LABS, SCOPE THAT WENT DOWN THE LUNGS, CT SCAN,     What test was performed: IN DR PETERS'S OFFICE    When was the test performed: SCOPE 11/9/22, CT 2 WEEKS AGO,     Where was the test performed:     Additional notes: WIFE ASKING THAT DR BECKHAM REVIEW THE RESULTS OF THE TESTING THAT WAS DONE THROUGH DR PETERS. WAS TREATING FOR PNEUMONIA AND FOUND A MASS IN HIS LUNG. THE PA OF DR PETERS CALLED AND SAID THEY DON'T KNOW WHAT TO DO. HE COULD WAIT UNTIL January TO DO ANOTHER SCAN. WIFE ASKING FOR HELP AND ADVICE. THEY ALSO OFFERED TO DO A NEEDLE BIOPSY. WIFE TRUSTS DR BECKHAM AND WANTS HIM TO REVIEW AND ADVISE.

## 2022-11-16 NOTE — TELEPHONE ENCOUNTER
Samples from the bronchoscopy and biopsy were negative for cancer but they are still worried.  He is being scheduled for a CT-guided needle biopsy of the mass.

## 2022-12-08 ENCOUNTER — HOSPITAL ENCOUNTER (INPATIENT)
Facility: HOSPITAL | Age: 72
LOS: 5 days | Discharge: HOME | End: 2022-12-13
Attending: HOSPITALIST | Admitting: INTERNAL MEDICINE

## 2022-12-08 ENCOUNTER — HOSPITAL ENCOUNTER (OUTPATIENT)
Dept: CT IMAGING | Facility: HOSPITAL | Age: 72
Discharge: HOME | End: 2022-12-08

## 2022-12-08 VITALS
SYSTOLIC BLOOD PRESSURE: 105 MMHG | OXYGEN SATURATION: 98 % | DIASTOLIC BLOOD PRESSURE: 62 MMHG | TEMPERATURE: 96.8 F | HEART RATE: 100 BPM | WEIGHT: 143.8 LBS | HEIGHT: 69 IN | RESPIRATION RATE: 20 BRPM | BODY MASS INDEX: 21.3 KG/M2

## 2022-12-08 VITALS
SYSTOLIC BLOOD PRESSURE: 113 MMHG | DIASTOLIC BLOOD PRESSURE: 69 MMHG | OXYGEN SATURATION: 98 % | RESPIRATION RATE: 18 BRPM | HEART RATE: 100 BPM

## 2022-12-08 DIAGNOSIS — J86.9 EMPYEMA LUNG: ICD-10-CM

## 2022-12-08 DIAGNOSIS — R91.8 LUNG MASS: ICD-10-CM

## 2022-12-08 PROBLEM — Z96.89 CHEST TUBE IN PLACE: Status: ACTIVE | Noted: 2022-12-08

## 2022-12-08 PROBLEM — R63.4 WEIGHT LOSS: Status: ACTIVE | Noted: 2022-12-08

## 2022-12-08 PROBLEM — J69.1: Status: ACTIVE | Noted: 2022-12-08

## 2022-12-08 LAB
ANION GAP SERPL CALCULATED.3IONS-SCNC: 12 MMOL/L (ref 5–15)
BASOPHILS # BLD AUTO: 0.02 10*3/MM3 (ref 0–0.2)
BASOPHILS NFR BLD AUTO: 0.2 % (ref 0–1.5)
BUN SERPL-MCNC: 10 MG/DL (ref 8–23)
BUN/CREAT SERPL: 18.2 (ref 7–25)
CALCIUM SPEC-SCNC: 9.4 MG/DL (ref 8.6–10.5)
CHLORIDE SERPL-SCNC: 93 MMOL/L (ref 98–107)
CO2 SERPL-SCNC: 25 MMOL/L (ref 22–29)
CREAT SERPL-MCNC: 0.55 MG/DL (ref 0.76–1.27)
CRP SERPL-MCNC: 10.34 MG/DL (ref 0–0.5)
DEPRECATED RDW RBC AUTO: 49.2 FL (ref 37–54)
EGFRCR SERPLBLD CKD-EPI 2021: 104.6 ML/MIN/1.73
EOSINOPHIL # BLD AUTO: 0.06 10*3/MM3 (ref 0–0.4)
EOSINOPHIL NFR BLD AUTO: 0.6 % (ref 0.3–6.2)
ERYTHROCYTE [DISTWIDTH] IN BLOOD BY AUTOMATED COUNT: 17.2 % (ref 12.3–15.4)
ERYTHROCYTE [SEDIMENTATION RATE] IN BLOOD: 87 MM/HR (ref 0–20)
FERRITIN SERPL-MCNC: 869.2 NG/ML (ref 30–400)
GLUCOSE SERPL-MCNC: 131 MG/DL (ref 65–99)
HCT VFR BLD AUTO: 29.7 % (ref 37.5–51)
HGB BLD-MCNC: 9.4 G/DL (ref 13–17.7)
IMM GRANULOCYTES # BLD AUTO: 0.18 10*3/MM3 (ref 0–0.05)
IMM GRANULOCYTES NFR BLD AUTO: 1.9 % (ref 0–0.5)
INR PPP: 1.22 (ref 0.84–1.13)
IRON 24H UR-MRATE: 19 MCG/DL (ref 59–158)
IRON SATN MFR SERPL: 7 % (ref 20–50)
LYMPHOCYTES # BLD AUTO: 1.75 10*3/MM3 (ref 0.7–3.1)
LYMPHOCYTES NFR BLD AUTO: 18.4 % (ref 19.6–45.3)
LYMPHOCYTES NFR FLD MANUAL: 1 %
MACROPHAGE FLUID: 4 %
MCH RBC QN AUTO: 25 PG (ref 26.6–33)
MCHC RBC AUTO-ENTMCNC: 31.6 G/DL (ref 31.5–35.7)
MCV RBC AUTO: 79 FL (ref 79–97)
MESOTHL CELL NFR FLD MANUAL: 3 %
MONOCYTES # BLD AUTO: 1.13 10*3/MM3 (ref 0.1–0.9)
MONOCYTES NFR BLD AUTO: 11.9 % (ref 5–12)
MONOCYTES NFR FLD: 3 %
NEUTROPHILS NFR BLD AUTO: 6.38 10*3/MM3 (ref 1.7–7)
NEUTROPHILS NFR BLD AUTO: 67 % (ref 42.7–76)
NEUTROPHILS NFR FLD MANUAL: 89 %
NRBC BLD AUTO-RTO: 0 /100 WBC (ref 0–0.2)
PLATELET # BLD AUTO: 390 10*3/MM3 (ref 140–450)
PMV BLD AUTO: 8.8 FL (ref 6–12)
POTASSIUM SERPL-SCNC: 4 MMOL/L (ref 3.5–5.2)
PROTHROMBIN TIME: 15.3 SECONDS (ref 11.4–14.4)
RBC # BLD AUTO: 3.76 10*6/MM3 (ref 4.14–5.8)
SODIUM SERPL-SCNC: 130 MMOL/L (ref 136–145)
TIBC SERPL-MCNC: 259 MCG/DL (ref 298–536)
TRANSFERRIN SERPL-MCNC: 174 MG/DL (ref 200–360)
TSH SERPL DL<=0.05 MIU/L-ACNC: 1.41 UIU/ML (ref 0.27–4.2)
WBC NRBC COR # BLD: 9.52 10*3/MM3 (ref 3.4–10.8)

## 2022-12-08 PROCEDURE — 99152 MOD SED SAME PHYS/QHP 5/>YRS: CPT

## 2022-12-08 PROCEDURE — 85610 PROTHROMBIN TIME: CPT | Performed by: RADIOLOGY

## 2022-12-08 PROCEDURE — 87205 SMEAR GRAM STAIN: CPT | Performed by: RADIOLOGY

## 2022-12-08 PROCEDURE — 0B9G3ZX DRAINAGE OF LEFT UPPER LUNG LOBE, PERCUTANEOUS APPROACH, DIAGNOSTIC: ICD-10-PCS | Performed by: RADIOLOGY

## 2022-12-08 PROCEDURE — G0378 HOSPITAL OBSERVATION PER HR: HCPCS

## 2022-12-08 PROCEDURE — 87116 MYCOBACTERIA CULTURE: CPT | Performed by: INTERNAL MEDICINE

## 2022-12-08 PROCEDURE — 87070 CULTURE OTHR SPECIMN AEROBIC: CPT | Performed by: RADIOLOGY

## 2022-12-08 PROCEDURE — 85652 RBC SED RATE AUTOMATED: CPT | Performed by: INTERNAL MEDICINE

## 2022-12-08 PROCEDURE — 87181 SC STD AGAR DILUTION PER AGT: CPT | Performed by: RADIOLOGY

## 2022-12-08 PROCEDURE — 84443 ASSAY THYROID STIM HORMONE: CPT | Performed by: INTERNAL MEDICINE

## 2022-12-08 PROCEDURE — 84466 ASSAY OF TRANSFERRIN: CPT | Performed by: INTERNAL MEDICINE

## 2022-12-08 PROCEDURE — 75989 ABSCESS DRAINAGE UNDER X-RAY: CPT

## 2022-12-08 PROCEDURE — 82728 ASSAY OF FERRITIN: CPT | Performed by: INTERNAL MEDICINE

## 2022-12-08 PROCEDURE — 0 LIDOCAINE 1 % SOLUTION: Performed by: RADIOLOGY

## 2022-12-08 PROCEDURE — 87075 CULTR BACTERIA EXCEPT BLOOD: CPT | Performed by: RADIOLOGY

## 2022-12-08 PROCEDURE — 99153 MOD SED SAME PHYS/QHP EA: CPT

## 2022-12-08 PROCEDURE — 25010000002 LINEZOLID 600 MG/300ML SOLUTION: Performed by: INTERNAL MEDICINE

## 2022-12-08 PROCEDURE — 87186 SC STD MICRODIL/AGAR DIL: CPT | Performed by: RADIOLOGY

## 2022-12-08 PROCEDURE — A9270 NON-COVERED ITEM OR SERVICE: HCPCS | Performed by: RADIOLOGY

## 2022-12-08 PROCEDURE — 83540 ASSAY OF IRON: CPT | Performed by: INTERNAL MEDICINE

## 2022-12-08 PROCEDURE — 87102 FUNGUS ISOLATION CULTURE: CPT | Performed by: INTERNAL MEDICINE

## 2022-12-08 PROCEDURE — C1729 CATH, DRAINAGE: HCPCS

## 2022-12-08 PROCEDURE — 87206 SMEAR FLUORESCENT/ACID STAI: CPT | Performed by: INTERNAL MEDICINE

## 2022-12-08 PROCEDURE — 63710000001 HYDROCODONE-ACETAMINOPHEN 5-325 MG TABLET: Performed by: RADIOLOGY

## 2022-12-08 PROCEDURE — 0W9B30Z DRAINAGE OF LEFT PLEURAL CAVITY WITH DRAINAGE DEVICE, PERCUTANEOUS APPROACH: ICD-10-PCS | Performed by: RADIOLOGY

## 2022-12-08 PROCEDURE — 25010000002 PIPERACILLIN SOD-TAZOBACTAM PER 1 G: Performed by: INTERNAL MEDICINE

## 2022-12-08 PROCEDURE — 80048 BASIC METABOLIC PNL TOTAL CA: CPT | Performed by: RADIOLOGY

## 2022-12-08 PROCEDURE — 92610 EVALUATE SWALLOWING FUNCTION: CPT

## 2022-12-08 PROCEDURE — 86140 C-REACTIVE PROTEIN: CPT | Performed by: INTERNAL MEDICINE

## 2022-12-08 PROCEDURE — 85025 COMPLETE CBC W/AUTO DIFF WBC: CPT | Performed by: RADIOLOGY

## 2022-12-08 PROCEDURE — 25010000002 FENTANYL CITRATE (PF) 50 MCG/ML SOLUTION: Performed by: RADIOLOGY

## 2022-12-08 PROCEDURE — 99223 1ST HOSP IP/OBS HIGH 75: CPT | Performed by: INTERNAL MEDICINE

## 2022-12-08 PROCEDURE — 89051 BODY FLUID CELL COUNT: CPT | Performed by: INTERNAL MEDICINE

## 2022-12-08 PROCEDURE — 25010000002 MIDAZOLAM PER 1 MG: Performed by: RADIOLOGY

## 2022-12-08 RX ORDER — FINASTERIDE 5 MG/1
5 TABLET, FILM COATED ORAL DAILY
Status: DISCONTINUED | OUTPATIENT
Start: 2022-12-08 | End: 2022-12-13 | Stop reason: HOSPADM

## 2022-12-08 RX ORDER — HYDROCODONE BITARTRATE AND ACETAMINOPHEN 5; 325 MG/1; MG/1
TABLET ORAL
Status: DISPENSED
Start: 2022-12-08 | End: 2022-12-08

## 2022-12-08 RX ORDER — ACETAMINOPHEN 160 MG/5ML
650 SOLUTION ORAL EVERY 4 HOURS PRN
Status: DISCONTINUED | OUTPATIENT
Start: 2022-12-08 | End: 2022-12-13 | Stop reason: HOSPADM

## 2022-12-08 RX ORDER — HYDROCHLOROTHIAZIDE 25 MG/1
25 TABLET ORAL DAILY
Status: DISCONTINUED | OUTPATIENT
Start: 2022-12-08 | End: 2022-12-08

## 2022-12-08 RX ORDER — MIDAZOLAM HYDROCHLORIDE 1 MG/ML
INJECTION INTRAMUSCULAR; INTRAVENOUS AS NEEDED
Status: COMPLETED | OUTPATIENT
Start: 2022-12-08 | End: 2022-12-08

## 2022-12-08 RX ORDER — LINEZOLID 2 MG/ML
600 INJECTION, SOLUTION INTRAVENOUS EVERY 12 HOURS
Status: DISCONTINUED | OUTPATIENT
Start: 2022-12-08 | End: 2022-12-09

## 2022-12-08 RX ORDER — LISINOPRIL 40 MG/1
40 TABLET ORAL
Status: DISCONTINUED | OUTPATIENT
Start: 2022-12-09 | End: 2022-12-13 | Stop reason: HOSPADM

## 2022-12-08 RX ORDER — SODIUM CHLORIDE 9 MG/ML
40 INJECTION, SOLUTION INTRAVENOUS AS NEEDED
Status: DISCONTINUED | OUTPATIENT
Start: 2022-12-08 | End: 2022-12-09 | Stop reason: HOSPADM

## 2022-12-08 RX ORDER — METOPROLOL SUCCINATE 100 MG/1
100 TABLET, EXTENDED RELEASE ORAL DAILY
Status: DISCONTINUED | OUTPATIENT
Start: 2022-12-08 | End: 2022-12-13 | Stop reason: HOSPADM

## 2022-12-08 RX ORDER — LISINOPRIL 40 MG/1
40 TABLET ORAL
Status: DISCONTINUED | OUTPATIENT
Start: 2022-12-08 | End: 2022-12-08

## 2022-12-08 RX ORDER — ONDANSETRON 2 MG/ML
4 INJECTION INTRAMUSCULAR; INTRAVENOUS EVERY 6 HOURS PRN
Status: DISCONTINUED | OUTPATIENT
Start: 2022-12-08 | End: 2022-12-13 | Stop reason: HOSPADM

## 2022-12-08 RX ORDER — LIDOCAINE HYDROCHLORIDE 10 MG/ML
20 INJECTION, SOLUTION INFILTRATION; PERINEURAL ONCE
Status: COMPLETED | OUTPATIENT
Start: 2022-12-08 | End: 2022-12-08

## 2022-12-08 RX ORDER — SODIUM CHLORIDE 9 MG/ML
100 INJECTION, SOLUTION INTRAVENOUS CONTINUOUS
Status: ACTIVE | OUTPATIENT
Start: 2022-12-08 | End: 2022-12-08

## 2022-12-08 RX ORDER — FENTANYL CITRATE 50 UG/ML
INJECTION, SOLUTION INTRAMUSCULAR; INTRAVENOUS
Status: DISPENSED
Start: 2022-12-08 | End: 2022-12-08

## 2022-12-08 RX ORDER — DEXTROSE MONOHYDRATE 25 G/50ML
50 INJECTION, SOLUTION INTRAVENOUS ONCE
Status: DISCONTINUED | OUTPATIENT
Start: 2022-12-08 | End: 2022-12-09 | Stop reason: HOSPADM

## 2022-12-08 RX ORDER — MIDAZOLAM HYDROCHLORIDE 1 MG/ML
INJECTION INTRAMUSCULAR; INTRAVENOUS
Status: DISPENSED
Start: 2022-12-08 | End: 2022-12-08

## 2022-12-08 RX ORDER — SODIUM CHLORIDE 0.9 % (FLUSH) 0.9 %
10 SYRINGE (ML) INJECTION AS NEEDED
Status: DISCONTINUED | OUTPATIENT
Start: 2022-12-08 | End: 2022-12-09 | Stop reason: HOSPADM

## 2022-12-08 RX ORDER — FENTANYL CITRATE 50 UG/ML
INJECTION, SOLUTION INTRAMUSCULAR; INTRAVENOUS AS NEEDED
Status: COMPLETED | OUTPATIENT
Start: 2022-12-08 | End: 2022-12-08

## 2022-12-08 RX ORDER — ONDANSETRON 4 MG/1
4 TABLET, FILM COATED ORAL EVERY 6 HOURS PRN
Status: DISCONTINUED | OUTPATIENT
Start: 2022-12-08 | End: 2022-12-13 | Stop reason: HOSPADM

## 2022-12-08 RX ORDER — ATORVASTATIN CALCIUM 20 MG/1
20 TABLET, FILM COATED ORAL DAILY
Status: DISCONTINUED | OUTPATIENT
Start: 2022-12-08 | End: 2022-12-13 | Stop reason: HOSPADM

## 2022-12-08 RX ORDER — HYDROCODONE BITARTRATE AND ACETAMINOPHEN 5; 325 MG/1; MG/1
1 TABLET ORAL EVERY 4 HOURS PRN
Status: DISCONTINUED | OUTPATIENT
Start: 2022-12-08 | End: 2022-12-09 | Stop reason: HOSPADM

## 2022-12-08 RX ORDER — SODIUM CHLORIDE 0.9 % (FLUSH) 0.9 %
3 SYRINGE (ML) INJECTION EVERY 12 HOURS SCHEDULED
Status: DISCONTINUED | OUTPATIENT
Start: 2022-12-08 | End: 2022-12-09 | Stop reason: HOSPADM

## 2022-12-08 RX ORDER — MORPHINE SULFATE 2 MG/ML
2 INJECTION, SOLUTION INTRAMUSCULAR; INTRAVENOUS
Status: DISCONTINUED | OUTPATIENT
Start: 2022-12-08 | End: 2022-12-09 | Stop reason: HOSPADM

## 2022-12-08 RX ORDER — ACETAMINOPHEN 650 MG/1
650 SUPPOSITORY RECTAL EVERY 4 HOURS PRN
Status: DISCONTINUED | OUTPATIENT
Start: 2022-12-08 | End: 2022-12-13 | Stop reason: HOSPADM

## 2022-12-08 RX ORDER — SODIUM CHLORIDE 0.9 % (FLUSH) 0.9 %
10 SYRINGE (ML) INJECTION EVERY 12 HOURS SCHEDULED
Status: DISCONTINUED | OUTPATIENT
Start: 2022-12-08 | End: 2022-12-13 | Stop reason: HOSPADM

## 2022-12-08 RX ORDER — ACETAMINOPHEN 325 MG/1
650 TABLET ORAL EVERY 4 HOURS PRN
Status: DISCONTINUED | OUTPATIENT
Start: 2022-12-08 | End: 2022-12-13 | Stop reason: HOSPADM

## 2022-12-08 RX ORDER — SODIUM CHLORIDE 9 MG/ML
40 INJECTION, SOLUTION INTRAVENOUS AS NEEDED
Status: DISCONTINUED | OUTPATIENT
Start: 2022-12-08 | End: 2022-12-13 | Stop reason: HOSPADM

## 2022-12-08 RX ORDER — SODIUM CHLORIDE 0.9 % (FLUSH) 0.9 %
10 SYRINGE (ML) INJECTION AS NEEDED
Status: DISCONTINUED | OUTPATIENT
Start: 2022-12-08 | End: 2022-12-13 | Stop reason: HOSPADM

## 2022-12-08 RX ORDER — TRAMADOL HYDROCHLORIDE 50 MG/1
50 TABLET ORAL EVERY 6 HOURS PRN
Status: DISCONTINUED | OUTPATIENT
Start: 2022-12-08 | End: 2022-12-13 | Stop reason: HOSPADM

## 2022-12-08 RX ADMIN — ATORVASTATIN CALCIUM 20 MG: 20 TABLET, FILM COATED ORAL at 13:55

## 2022-12-08 RX ADMIN — METOPROLOL SUCCINATE 100 MG: 100 TABLET, EXTENDED RELEASE ORAL at 13:55

## 2022-12-08 RX ADMIN — MIDAZOLAM HYDROCHLORIDE 1 MG: 1 INJECTION, SOLUTION INTRAMUSCULAR; INTRAVENOUS at 09:47

## 2022-12-08 RX ADMIN — FENTANYL CITRATE 50 MCG: 50 INJECTION, SOLUTION INTRAMUSCULAR; INTRAVENOUS at 09:53

## 2022-12-08 RX ADMIN — Medication 10 ML: at 13:56

## 2022-12-08 RX ADMIN — TAZOBACTAM SODIUM AND PIPERACILLIN SODIUM 4.5 G: 500; 4 INJECTION, SOLUTION INTRAVENOUS at 15:36

## 2022-12-08 RX ADMIN — FENTANYL CITRATE 50 MCG: 50 INJECTION, SOLUTION INTRAMUSCULAR; INTRAVENOUS at 09:39

## 2022-12-08 RX ADMIN — TRAMADOL HYDROCHLORIDE 50 MG: 50 TABLET, COATED ORAL at 18:46

## 2022-12-08 RX ADMIN — MIDAZOLAM HYDROCHLORIDE 1 MG: 1 INJECTION, SOLUTION INTRAMUSCULAR; INTRAVENOUS at 09:30

## 2022-12-08 RX ADMIN — FINASTERIDE 5 MG: 5 TABLET, FILM COATED ORAL at 13:56

## 2022-12-08 RX ADMIN — LIDOCAINE HYDROCHLORIDE 20 ML: 10 INJECTION, SOLUTION INFILTRATION; PERINEURAL at 09:25

## 2022-12-08 RX ADMIN — FENTANYL CITRATE 50 MCG: 50 INJECTION, SOLUTION INTRAMUSCULAR; INTRAVENOUS at 09:30

## 2022-12-08 RX ADMIN — SODIUM CHLORIDE 100 ML/HR: 9 INJECTION, SOLUTION INTRAVENOUS at 13:56

## 2022-12-08 RX ADMIN — LINEZOLID 600 MG: 600 INJECTION, SOLUTION INTRAVENOUS at 17:20

## 2022-12-08 RX ADMIN — HYDROCODONE BITARTRATE AND ACETAMINOPHEN 1 TABLET: 5; 325 TABLET ORAL at 11:24

## 2022-12-08 RX ADMIN — TAZOBACTAM SODIUM AND PIPERACILLIN SODIUM 4.5 G: 500; 4 INJECTION, SOLUTION INTRAVENOUS at 20:08

## 2022-12-08 NOTE — PRE-PROCEDURE NOTE
[facility]   Vascular Interventional Radiology  History & Physicial    Pertinent information including components of history, physical examination, review of systems, lab and imaging data, and impression and plan from this source was also reviewed for the creation of the following pre-procedural note: H&P (View-Only) by Jeremias Hughes MD (2022 11:00)       Patient Name:Rhett Isaacs    : 1950    MRN: [id number]    Primary Care Physician: Elieser Delvalle MD    Referring Physician: Rashmi Peraza, EAN     Date of admission: 2022    Subjective     Reason for Consult: Left lung biopsy    History of Present Illness     Rhett Isaacs is a 72 y.o. male referred to IR as noted above.      Active Hospital Problems:  There are no active hospital problems to display for this patient.      Personal History     Past Medical History:   Diagnosis Date   • Acute bronchitis    • Acute prostatitis    • Acute serous otitis media    • Benign prostatic hyperplasia    • Cancer (HCC) 2015    Basil cell carcinoma   • Cataract     still have them- bilat   • Colon polyp 10/2017   • Diabetes mellitus (HCC) 1967    doesnt checks sugar   • Diverticulosis 2004    Found in first colonoscopy   • Dysuria    • Fatigue    • GERD (gastroesophageal reflux disease)    • Hyperlipidemia    • Hypertension    • Impacted cerumen    • Prostatitis    • PVC (premature ventricular contraction)    • Wears glasses     readers       Past Surgical History:   Procedure Laterality Date   • ADENOIDECTOMY     • BASAL CELL CARCINOMA EXCISION     • BRONCHOSCOPY N/A 2022    Procedure: BRONCHOSCOPY WITH ENDOBRONCHIAL ULTRASOUND WITH FLUOROSCOPY;  Surgeon: Jeremias Hughes MD;  Location: [facility];  Service: Pulmonary;  Laterality: N/A;  EBUS balloon removed and intact   • COLONOSCOPY  10/23/2017   • EYE SURGERY  2015    Basil cell carcenoma   • TONSILLECTOMY     • WISDOM TOOTH  "EXTRACTION         Family History: His family history includes Alcohol abuse in his father; Hyperlipidemia in his mother; Hypertension in his brother.     Social History: He  reports that he has never smoked. He has never used smokeless tobacco. He reports that he does not drink alcohol and does not use drugs.    Home Medications:  benzonatate, finasteride, hydroCHLOROthiazide, metoprolol succinate XL, quinapril, simvastatin, and traMADol    Current Medications:  •  dextrose  •  fentaNYL citrate (PF)  •  midazolam  •  sodium chloride  •  sodium chloride  •  sodium chloride     Allergies:  He is allergic to penicillins, cardizem [diltiazem hcl], and sulfa antibiotics.    Review of Systems    IR Procedure pertinent significant findings are mentioned in the PMH and PSH above.    Objective     Visit Vitals  /80   Pulse 112   Temp 96.8 °F (36 °C)   Resp 16   Ht 175.3 cm (69\")   Wt 65.2 kg (143 lb 12.8 oz)   SpO2 96%   BMI 21.24 kg/m²        Physical Exam    A&Ox3.   Able to communicate  No Apparent Distress  Average physique   CVS: Regular rate and rhythm  Respiratory: Non labored breathing. No signs of respiratory compromise.    Result Review      I have personally reviewed the results from the time of this admission to 12/8/2022 09:04 EST and agree with these findings.  [x]  Laboratory  []  Microbiology  [x]  Radiology  []  EKG/Telemetry   []  Cardiology/Vascular   []  Pathology  []  Old records  []  Other:    Most notable findings include: As noted:    Results from last 7 days   Lab Units 12/08/22  0722   INR  1.22*   HEMOGLOBIN g/dL 9.4*   HEMATOCRIT % 29.7*   PLATELETS 10*3/mm3 390       Estimated Creatinine Clearance: 110.3 mL/min (A) (by C-G formula based on SCr of 0.55 mg/dL (L)).   Creatinine   Date Value Ref Range Status   12/08/2022 0.55 (L) 0.76 - 1.27 mg/dL Final       COVID19   Date Value Ref Range Status   10/05/2022 Not Detected Not Detected - Ref. Range Final        No results found for: " PREGTESTUR, PREGSERUM, HCG, HCGQUANT     ASA SCALE ASSESSMENT (applicable ONLY if sedation planned):  2-Mild to moderate systemic disease, medically well controlled, with no functional limitation     MALLAMPATI CLASSIFICATION (applicable ONLY if sedation planned):  1-Able to visualize the soft palate, fauces, uvula, anterior & posterior tonsilar pillars.    Assessment / Plan     Rhett Isaacs is a 72 y.o. male referred to the IR service with above problem.    Plan:   As above.    Notice: The note was created before the performance of the procedure. It might have been left in the pending status and signed off after the procedure. The time stamp on the note may be misleading.    Ollie Segura MD   Vascular Interventional Radiology  12/08/22   9:01 AM EST

## 2022-12-08 NOTE — THERAPY EVALUATION
Acute Care - Speech Language Pathology   Swallow Initial Evaluation [facility]   Clinical Swallow Evaluation     Patient Name: Rhett Isaacs  : 1950  MRN: [id number]  Today's Date: 2022               Admit Date: 2022    Visit Dx:   No diagnosis found.  Patient Active Problem List   Diagnosis   • Benign hypertrophy of prostate   • Mixed hyperlipidemia   • Essential hypertension, benign   • Lung mass   • Lipoid pneumonia (HCC)   • Chest tube in place   • Weight loss     Past Medical History:   Diagnosis Date   • Acute bronchitis    • Acute prostatitis    • Acute serous otitis media    • Benign prostatic hyperplasia    • Cancer (HCC) 2015    Basil cell carcinoma   • Cataract     still have them- bilat   • Colon polyp 10/2017   • Diabetes mellitus (HCC) 1967    doesnt checks sugar   • Diverticulosis 2004    Found in first colonoscopy   • Dysuria    • Fatigue    • GERD (gastroesophageal reflux disease)    • Hyperlipidemia    • Hypertension    • Impacted cerumen    • Prostatitis    • PVC (premature ventricular contraction)    • Wears glasses     readers     Past Surgical History:   Procedure Laterality Date   • ADENOIDECTOMY     • BASAL CELL CARCINOMA EXCISION     • BRONCHOSCOPY N/A 2022    Procedure: BRONCHOSCOPY WITH ENDOBRONCHIAL ULTRASOUND WITH FLUOROSCOPY;  Surgeon: Jeremias Hughes MD;  Location[facility] END[facility]COPY;  Service: Pulmonary;  Laterality: N/A;  EBUS balloon removed and intact   • COLONOSCOPY  10/23/2017   • EYE SURGERY  2015    Basil cell carcenoma   • TONSILLECTOMY     • WISDOM TOOTH EXTRACTION         SLP Recommendation and Plan  SLP Swallowing Diagnosis: swallow WFL/no suspected pharyngeal impairment (22 1530)  SLP Diet Recommendation: regular textures, thin liquids (22 1530)  Recommended Precautions and Strategies: upright posture during/after eating, general aspiration precautions (22 1530)  SLP Rec. for Method of Medication  Administration: as tolerated (12/08/22 1530)     Monitor for Signs of Aspiration: notify SLP if any concerns (12/08/22 1530)     Swallow Criteria for Skilled Therapeutic Interventions Met: no problems identified which require skilled intervention (12/08/22 1530)  Anticipated Discharge Disposition (SLP): home (12/08/22 1530)     Therapy Frequency (Swallow): evaluation only (12/08/22 1530)                                           Plan of Care Reviewed With: patient, spouse      SWALLOW EVALUATION (last 72 hours)     SLP Adult Swallow Evaluation     Row Name 12/08/22 1530                   Rehab Evaluation    Document Type evaluation  -MP        Subjective Information no complaints  -MP        Patient Observations alert;cooperative  -MP        Patient/Family/Caregiver Comments/Observations Spouse present  -MP        Patient Effort good  -MP           General Information    Patient Profile Reviewed yes  -MP        Pertinent History Of Current Problem Adm 128 from IR after chest tube placement. Adm in September when concerned for aspiration event while sleeping - tx w/ abx w/o improvmt - CT scan revealed mass in inferior DIEGO & loculated pleural effusion. underwent bronch w/ bx 11/9/22 - path c/w lipoid PNA. Still concern for malignancy & presented for CT guided needle bx today. Hx GERD, intolerant of PPIs.  -MP        Current Method of Nutrition regular textures;thin liquids  -MP        Precautions/Limitations, Vision WFL  -MP        Precautions/Limitations, Hearing WFL  -MP        Prior Level of Function-Communication WFL  -MP        Prior Level of Function-Swallowing no diet consistency restrictions;esophageal concerns  -MP        Plans/Goals Discussed with patient;spouse/S.O.;agreed upon  -MP        Barriers to Rehab medically complex  -MP        Patient's Goals for Discharge patient did not state  -MP        Family Goals for Discharge family did not state  -MP           Pain    Additional Documentation Pain Scale:  FACES Pre/Post-Treatment (Group)  -MP           Pain Scale: FACES Pre/Post-Treatment    Pain: FACES Scale, Pretreatment 0-->no hurt  -MP        Posttreatment Pain Rating 0-->no hurt  -MP           Oral Motor Structure and Function    Dentition Assessment natural, present and adequate  -MP        Secretion Management WNL/WFL  -MP        Mucosal Quality moist, healthy  -MP           Oral Musculature and Cranial Nerve Assessment    Oral Motor General Assessment WFL  -MP           General Eating/Swallowing Observations    Respiratory Support Currently in Use room air  -MP        Eating/Swallowing Skills fed by SLP  -MP        Positioning During Eating upright in bed  -MP        Utensils Used spoon;cup;straw  -MP        Consistencies Trialed thin liquids;pureed;regular textures  -MP           Clinical Swallow Eval    Pharyngeal Phase no overt signs/symptoms of pharyngeal impairment  -MP           SLP Evaluation Clinical Impression    SLP Swallowing Diagnosis swallow WFL/no suspected pharyngeal impairment  -MP        Swallow Criteria for Skilled Therapeutic Interventions Met no problems identified which require skilled intervention  -MP           Recommendations    Therapy Frequency (Swallow) evaluation only  -MP        SLP Diet Recommendation regular textures;thin liquids  -MP        Recommended Precautions and Strategies upright posture during/after eating;general aspiration precautions  -MP        Oral Care Recommendations Oral Care BID/PRN  -MP        SLP Rec. for Method of Medication Administration as tolerated  -MP        Monitor for Signs of Aspiration notify SLP if any concerns  -MP        Anticipated Discharge Disposition (SLP) home  -MP              User Key  (r) = Recorded By, (t) = Taken By, (c) = Cosigned By    Initials Name Effective Dates    MP Torin Alcala, MS CCC-SLP 12/28/21 -                 EDUCATION  The patient has been educated in the following areas:   Dysphagia (Swallowing Impairment).               Time Calculation:    Time Calculation- SLP     Row Name 12/08/22 1549             Time Calculation- SLP    SLP Start Time 1530  -MP      SLP Received On 12/08/22  -MP         Untimed Charges    81362-AL Eval Oral Pharyng Swallow Minutes 40  -MP         Total Minutes    Untimed Charges Total Minutes 40  -MP       Total Minutes 40  -MP            User Key  (r) = Recorded By, (t) = Taken By, (c) = Cosigned By    Initials Name Provider Type     Torin Alcala, MS CCC-SLP Speech and Language Pathologist                Therapy Charges for Today     Code Description Service Date Service Provider Modifiers Qty    [id number]  ST EVAL ORAL PHARYNG SWALLOW 3 12/8/2022 Torin Alcala MS CCC-SLP GN 1               Torin Escobedo MS CCC-CARISSA  12/8/2022

## 2022-12-08 NOTE — PLAN OF CARE
Goal Outcome Evaluation:  Plan of Care Reviewed With: patient, spouse            SLP evaluation completed. Will sign-off. Please see note for further details and recommendations.

## 2022-12-08 NOTE — NURSING NOTE
Image guided left lung aspiration 10ml purulent drainage, preformed by MD Segura and sent to lab for testing. Patient positioned on left side with bedrest orders. Patient was sedated with 1 mg Versed and 100 mcg Fentanyl; with a sedation time of x12 minutes. Patient tolerated well. Report called to nurse.  DLP

## 2022-12-08 NOTE — H&P
[facility] Medicine Services  HISTORY AND PHYSICAL    Patient Name: Rhett Isaacs  : 1950  MRN: [id number]  Primary Care Physician: Elieser Delvalle MD  Date of admission: 2022      Subjective   Subjective     Chief Complaint:  Admit from IR for chest tube    HPI:  Rhett Isaacs is a 72 y.o. male w/ BPH, HTN, HLD, non-smoker who was admitted from the IR suite after chest tube was placed. In September he had a vomiting event while sleeping w/ concern for aspiration, treated w/ 2 rounds of Abx w/o improvement, CT scan w/ 6.3cm mass in the inferior DIEGO and loculated pleural effusion. Referred to pulm and underwent bronch w/ Bx 22 by Dr. Hughes - path was c/w lipoid PNA, Cx were negative including acid fast. There remained concern for endobronchial malignancy and CT guided needle Bx was planned which was performed today. On CT imaging the pleural effusion previously identified has inc in size and there was concern for empyema, pulm was contacted and a chest tube was placed by IR.    The patient reports chronic GERD syx, infrequent night time vomiting, and is intolerant of PPI's for more than 2-3 days (intractable nausea). He sleeps propped up with multiple pillows. He has lost ~40lbs in the last 3 months due to lack of appetite. He has now chronic nonproductive cough. Last colonoscopy was ~5 years ago w/ 1 polyp and told to repeat in 3 years. He has never had an EGD. Dr. Torres is his GI.      Review of Systems   Gen- No fevers, chills  CV- No chest pain, palpitations  Resp- yes cough, no dyspnea  GI- No N/V/D, abd pain  All other systems reviewed and are negative.     Personal History     Past Medical History:   Diagnosis Date   • Acute bronchitis    • Acute prostatitis    • Acute serous otitis media    • Benign prostatic hyperplasia    • Cancer (HCC) 2015    Basil cell carcinoma   • Cataract     still have them- bilat   • Colon polyp 10/2017   •  Diabetes mellitus (HCC) 06/1967    doesnt checks sugar   • Diverticulosis 2004    Found in first colonoscopy   • Dysuria    • Fatigue    • GERD (gastroesophageal reflux disease) 2016   • Hyperlipidemia    • Hypertension    • Impacted cerumen    • Prostatitis    • PVC (premature ventricular contraction)    • Wears glasses     readers             Past Surgical History:   Procedure Laterality Date   • ADENOIDECTOMY  1956   • BASAL CELL CARCINOMA EXCISION  2015   • BRONCHOSCOPY N/A 11/9/2022    Procedure: BRONCHOSCOPY WITH ENDOBRONCHIAL ULTRASOUND WITH FLUOROSCOPY;  Surgeon: Jeremias Hughes MD;  Location: [facility] ENDOSCOPY;  Service: Pulmonary;  Laterality: N/A;  EBUS balloon removed and intact   • COLONOSCOPY  10/23/2017   • EYE SURGERY  September 2015    Basil cell carcenoma   • TONSILLECTOMY     • WISDOM TOOTH EXTRACTION         Family History:  family history includes Alcohol abuse in his father; Hyperlipidemia in his mother; Hypertension in his brother. Otherwise pertinent FHx was reviewed and unremarkable.     Social History:  reports that he has never smoked. He has never used smokeless tobacco. He reports that he does not drink alcohol and does not use drugs.  Social History     Social History Narrative   • Not on file       Medications:  Available home medication information reviewed.  Medications Prior to Admission   Medication Sig Dispense Refill Last Dose   • finasteride (PROSCAR) 5 MG tablet Take 1 tablet by mouth Daily. 90 tablet 3 12/7/2022   • quinapril (ACCUPRIL) 40 MG tablet TAKE 1 TABLET BY MOUTH EVERY DAY (Patient taking differently: Take 40 mg by mouth Daily.) 90 tablet 3 12/8/2022   • simvastatin (ZOCOR) 40 MG tablet TAKE 1 TABLET BY MOUTH EVERY DAY AT NIGHT (Patient taking differently: Take 40 mg by mouth Every Night.) 90 tablet 3 12/7/2022   • benzonatate (TESSALON) 100 MG capsule Take 1 capsule by mouth 3 (Three) Times a Day As Needed for Cough. 42 capsule 3    • hydroCHLOROthiazide  (HYDRODIURIL) 25 MG tablet Take 1 tablet by mouth Daily. 90 tablet 3    • metoprolol succinate XL (TOPROL-XL) 100 MG 24 hr tablet TAKE 1 TABLET BY MOUTH EVERY DAY (Patient taking differently: 100 mg Daily.) 90 tablet 3 Unknown   • traMADol (ULTRAM) 50 MG tablet Take 1 tablet by mouth Every 6 (Six) Hours As Needed for Moderate Pain. 40 tablet 1 Unknown       Allergies   Allergen Reactions   • Penicillins Diarrhea   • Cardizem [Diltiazem Hcl] Rash   • Sulfa Antibiotics Rash       Objective   Objective     Vital Signs:   Temp:  [96.8 °F (36 °C)-98.6 °F (37 °C)] 98.6 °F (37 °C)  Heart Rate:  [] 94  Resp:  [14-22] 18  BP: (105-132)/(60-88) 117/75  Flow (L/min):  [2] 2       Physical Exam   Constitutional: Awake, alert, laying in bed in NAD  Eyes: PERRL, sclerae anicteric, no conjunctival injection  HENT: NCAT, mucous membranes moist  Neck: Supple, trachea midline  Respiratory: Clear to auscultation bilaterally, nonlabored respirations   Cardiovascular: RRR, no murmurs, rubs, or gallops, palpable radial pulses bilaterally  Gastrointestinal: Positive bowel sounds, soft, nontender, nondistended  Musculoskeletal: Overall appearance of diminished muscle mass, LT chest tube with small volume milky material, no peripheral edema  Psychiatric: Appropriate affect, cooperative  Neurologic: Oriented x 3, strength symmetric in all extremities, speech clear    Result Review:  I have personally reviewed the results from the time of this admission to 12/8/2022 12:58 EST and agree with these findings:  []  Laboratory list / accordion  []  Microbiology  []  Radiology  []  EKG/Telemetry   []  Cardiology/Vascular   []  Pathology  [x]  Old records  []  Other:  Most notable findings include: ongoing workup for lung mass        LAB RESULTS:      Lab 12/08/22  0722   WBC 9.52   HEMOGLOBIN 9.4*   HEMATOCRIT 29.7*   PLATELETS 390   NEUTROS ABS 6.38   IMMATURE GRANS (ABS) 0.18*   LYMPHS ABS 1.75   MONOS ABS 1.13*   EOS ABS 0.06   MCV 79.0    PROTIME 15.3*   INR 1.22*         Lab 12/08/22  0722   SODIUM 130*   POTASSIUM 4.0   CHLORIDE 93*   CO2 25.0   ANION GAP 12.0   BUN 10   CREATININE 0.55*   EGFR 104.6   GLUCOSE 131*   CALCIUM 9.4                             Microbiology Results (last 10 days)     ** No results found for the last 240 hours. **          CT Guided Chest Tube    Result Date: 12/8/2022  CT GUIDED ABSCESS DRAIN LUNG-, CT GUIDED CHEST TUBE PLACEMENT-                                                         History: left lung mass; R91.8-Other nonspecific abnormal finding of lung field    : Ollie Segura MD.                                                                            Modality: Computed tomography  Radiation dose for this procedure was 463 mGy-cm.           DOSE REDUCTION: The examination was performed according to departmental dose-optimization program which includes automated exposure control, adjustment of the mA and/or kV according to patient size and/or use of iterative reconstruction technique.                                                                                         SEDATION: Moderate sedation was administered. 2 milligram of Versed and 150 micrograms of fentanyl IV was used for moderate sedation monitored under my direction. Total intra service time of sedation was 35 minutes. The patient's vital signs were monitored throughout the procedure and recorded in the patient's medical record by the nurse.  Anesthesia: Lidocaine, local infiltration.  Estimated blood loss:  < 5 cc.          Technique: A thorough discussion of the risks, benefits, and alternatives of the procedure, and if applicable, moderate sedation, was carried out with the patient. They were encouraged to ask any questions. Any questions were answered. They verbalized understanding. A written informed consent was then signed.   A multi-component timeout was performed prior to starting the procedure using the departmental  protocol.  The procedure room personnel used personal protective equipment. The operators used sterile gloves and if indicated, sterile gowns. The surgical site was prepped with chlorhexidine gluconate  and draped in the maximal applicable sterile fashion.  The patient was positioned supine on the CT table. A preliminary CT was performed to assess the target and determine a safe access site, distance and angle to the target. A left anterior intercostal intercostal access site was selected. The site was sterilely prepped with chlorhexidine gluconate and draped. After local anesthesia infiltration, a dermatotomy was performed.  Using aseptic precautions, under CT guidance, the target lesion was accessed with a 17-gauge guide. The lesion is apparently necrotic on the CT. Therefore I aspirated first and recovered approximately 13 cc of thick yellowish-greenish pus. The specimen was labeled and sent to the lab. The guide was withdrawn and an aseptic dressing applied.  The left pleural effusion seen on 10/26/2022 chest CT has progressed into a large left posterior/lateral loculated fluid collection, most likely an empyema with thick walls. I discussed the case with Dr. Barksdale and we agreed to place a small bore pleural drain. The patient was rolled up on the right side. Using CT guidance, after local anesthesia and dermatotomy, an 18-gauge Chiba needle was advanced into the collection with recovery of pus. Over a guidewire, an 8.5 English locking pigtail catheter was advanced into the empyema, the pigtail locked and the catheter connected to a Pleur-evac. The catheter was secured to skin with nonabsorbable suture and stay fix.  The patient tolerated the procedure well. After recovery, the patient was discharged from the department in stable condition. The agreed-upon plan is to admit the patient to the hospitalist service with follow-up by pulmonary service.           Complications: None immediate. There is replacement of  the pus in the left lingular lobe lung abscess by air. The abscess appears much smaller. The pigtail catheter is in good position.  Cores: Number of Cores if obtained: 2  Specimen: The specimen was labeled and sent to the lab in appropriate carriers & containers if such was requested by the ordering provider.                                                               Impression: Impression:                                                              Successful CT guided aspiration of a left lung mass that turned out to be a lung abscess, likely related to the history of prior aspiration.  Successful CT-guided placement of an 8.5 Arabic drainage catheter in the left empyema.  Thank you for the opportunity to assist in the care of your patient.  This report was finalized on 12/8/2022 11:58 AM by Ollie Segura MD.      CT Guided Abscess Drain Lung    Result Date: 12/8/2022  CT GUIDED ABSCESS DRAIN LUNG-, CT GUIDED CHEST TUBE PLACEMENT-                                                         History: left lung mass; R91.8-Other nonspecific abnormal finding of lung field    : Ollie Segura MD.                                                                            Modality: Computed tomography  Radiation dose for this procedure was 463 mGy-cm.           DOSE REDUCTION: The examination was performed according to departmental dose-optimization program which includes automated exposure control, adjustment of the mA and/or kV according to patient size and/or use of iterative reconstruction technique.                                                                                         SEDATION: Moderate sedation was administered. 2 milligram of Versed and 150 micrograms of fentanyl IV was used for moderate sedation monitored under my direction. Total intra service time of sedation was 35 minutes. The patient's vital signs were monitored throughout the procedure and recorded in the patient's medical  record by the nurse.  Anesthesia: Lidocaine, local infiltration.  Estimated blood loss:  < 5 cc.          Technique: A thorough discussion of the risks, benefits, and alternatives of the procedure, and if applicable, moderate sedation, was carried out with the patient. They were encouraged to ask any questions. Any questions were answered. They verbalized understanding. A written informed consent was then signed.   A multi-component timeout was performed prior to starting the procedure using the departmental protocol.  The procedure room personnel used personal protective equipment. The operators used sterile gloves and if indicated, sterile gowns. The surgical site was prepped with chlorhexidine gluconate  and draped in the maximal applicable sterile fashion.  The patient was positioned supine on the CT table. A preliminary CT was performed to assess the target and determine a safe access site, distance and angle to the target. A left anterior intercostal intercostal access site was selected. The site was sterilely prepped with chlorhexidine gluconate and draped. After local anesthesia infiltration, a dermatotomy was performed.  Using aseptic precautions, under CT guidance, the target lesion was accessed with a 17-gauge guide. The lesion is apparently necrotic on the CT. Therefore I aspirated first and recovered approximately 13 cc of thick yellowish-greenish pus. The specimen was labeled and sent to the lab. The guide was withdrawn and an aseptic dressing applied.  The left pleural effusion seen on 10/26/2022 chest CT has progressed into a large left posterior/lateral loculated fluid collection, most likely an empyema with thick walls. I discussed the case with Dr. Barksdale and we agreed to place a small bore pleural drain. The patient was rolled up on the right side. Using CT guidance, after local anesthesia and dermatotomy, an 18-gauge Chiba needle was advanced into the collection with recovery of pus. Over a  guidewire, an 8.5 Malagasy locking pigtail catheter was advanced into the empyema, the pigtail locked and the catheter connected to a Pleur-evac. The catheter was secured to skin with nonabsorbable suture and stay fix.  The patient tolerated the procedure well. After recovery, the patient was discharged from the department in stable condition. The agreed-upon plan is to admit the patient to the hospitalist service with follow-up by pulmonary service.           Complications: None immediate. There is replacement of the pus in the left lingular lobe lung abscess by air. The abscess appears much smaller. The pigtail catheter is in good position.  Cores: Number of Cores if obtained: 2  Specimen: The specimen was labeled and sent to the lab in appropriate carriers & containers if such was requested by the ordering provider.                                                               Impression: Impression:                                                              Successful CT guided aspiration of a left lung mass that turned out to be a lung abscess, likely related to the history of prior aspiration.  Successful CT-guided placement of an 8.5 Malagasy drainage catheter in the left empyema.  Thank you for the opportunity to assist in the care of your patient.  This report was finalized on 12/8/2022 11:58 AM by Ollie Segura MD.            Assessment & Plan   Assessment & Plan     Active Hospital Problems    Diagnosis  POA   • **Lipoid pneumonia (HCC) [J69.1]  Yes   • Chest tube in place [Z96.89]  Yes   • Weight loss [R63.4]  Yes   • Benign hypertrophy of prostate [N40.0]  Yes   • Mixed hyperlipidemia [E78.2]  Yes   • Essential hypertension, benign [I10]  Yes     Summary: This is a 71 y/o male, nonsmoker, w/ severe GERD syx, occasional nocturnal vomiting/reflux, who has been evaluated since September 2022 for LT lung lesion now s/p needle aspiration with concern for abscess and subsequent chest tube  placement    Assessment/Plan    LT lung lesion  Loculated LT pleural effusion s/p aspiration and chest tube  Lipoid pneumonia?  -s/p bronch w/ Bx 11/9/22 - path c/w lipoid pna, cx data negative  -chest tube placed by IR 12/8/22 w/ milky white drainage  -cultures sent and pending  -SLP consult  -attempt to add on labs to fluid collected during procedure  -pulm consult, previously seen by Dr. Hughes, hold abx pending their eval (vitals stable, WBC count normal, prev path c/w lipoid aspiration, problem chronic in nature w/ prev neg Cx)    Severe GERD w/ persistent symptoms  Intolerance to PPI's  Abnormal weight loss  New microcytic anemia  -approx 40lb wght loss over 3 months  -needs EGD to r/o GI malignancy, pt/wife prefer opt f/u w/ Dr. Torres  -check TSH, inflammatory markers, iron profile    Mild hyponatremia of unclear significance  -poor oral intake for weeks/months  -IVF x1 bag  -hold HCTZ  -labs in the AM    HTN  HLD  BPH  -home atorvastatin, finasteride, lisinopril, metoprolol xl    DVT prophylaxis:  SCD's    CODE STATUS:    Code Status and Medical Interventions:   Ordered at: 12/08/22 1258     Code Status (Patient has no pulse and is not breathing):    CPR (Attempt to Resuscitate)     Medical Interventions (Patient has pulse or is breathing):    Full Support         Kaleb Chapman, DO  12/08/22

## 2022-12-08 NOTE — PROGRESS NOTES
Pulmonary update:    I was called and alerted to the patient's admission by one of my partners.  I previously saw Mr. Isaacs for evaluation of the left upper lobe mass.  Bronchoscopy was relatively unrevealing and showed only inflammatory changes.  The patient did have a very strong history of aspiration prior to his initial respiratory issues leading to my evaluation and the initial CT scan showing the mass.  He underwent evaluation by interventional radiology today.  I have reviewed the films and the report.  The aforementioned mass in the left upper lobe has actually decreased in size.  There has however been subsequent development of a worsened effusion on the left.  Biopsy of the left upper lobe remaining mass was undertaken and a chest tube was placed within the posterior effusion.  It was reported that thick pus was aspirated.  The patient was subsequently admitted to the hospital for further care.    Fluid studies are currently pending.  I have not been by to see the patient yet although we will visit with him later this evening.  At this time, I would recommend the following:    Initiate broad-spectrum antibiotics to cover for lung abscess/empyema.  I am going to go ahead and start linezolid and Zosyn for now.  We will follow cultures and stains.  Check MRSA probe for now with hopes that we can back off on some of this coverage.  I will ask for the infectious disease service to consult on the case for further guidance.  Chest x-ray will be ordered for tomorrow morning.  Maintain chest tube with 20 cm of water suction for now.    I will continue to follow along very closely with you.    Jeremias Hughes MD

## 2022-12-08 NOTE — NURSING NOTE
Image guided left lung aspiration preformed by MD Segura, and sent to lab for testing.Patient was sedated with 2mg Versed and 150mcg Fentanyl; with a sedation time of 35 minutes. Patient tolerated well. Report called to nurse.  [id number]

## 2022-12-09 ENCOUNTER — APPOINTMENT (OUTPATIENT)
Dept: GENERAL RADIOLOGY | Facility: HOSPITAL | Age: 72
End: 2022-12-09

## 2022-12-09 PROBLEM — J18.0 BRONCHIAL PNEUMONIA: Status: ACTIVE | Noted: 2022-12-08

## 2022-12-09 PROBLEM — J86.9 EMPYEMA LUNG: Status: ACTIVE | Noted: 2022-12-09

## 2022-12-09 LAB
ALBUMIN SERPL-MCNC: 2.6 G/DL (ref 3.5–5.2)
ALBUMIN/GLOB SERPL: 0.6 G/DL
ALP SERPL-CCNC: 96 U/L (ref 39–117)
ALT SERPL W P-5'-P-CCNC: 23 U/L (ref 1–41)
ANION GAP SERPL CALCULATED.3IONS-SCNC: 7 MMOL/L (ref 5–15)
AST SERPL-CCNC: 17 U/L (ref 1–40)
BASOPHILS # BLD AUTO: 0.01 10*3/MM3 (ref 0–0.2)
BASOPHILS NFR BLD AUTO: 0.1 % (ref 0–1.5)
BILIRUB SERPL-MCNC: 0.4 MG/DL (ref 0–1.2)
BUN SERPL-MCNC: 7 MG/DL (ref 8–23)
BUN/CREAT SERPL: 9.5 (ref 7–25)
CALCIUM SPEC-SCNC: 9.1 MG/DL (ref 8.6–10.5)
CHLORIDE SERPL-SCNC: 94 MMOL/L (ref 98–107)
CO2 SERPL-SCNC: 27 MMOL/L (ref 22–29)
CREAT SERPL-MCNC: 0.74 MG/DL (ref 0.76–1.27)
DEPRECATED RDW RBC AUTO: 49.1 FL (ref 37–54)
EGFRCR SERPLBLD CKD-EPI 2021: 95.7 ML/MIN/1.73
EOSINOPHIL # BLD AUTO: 0.12 10*3/MM3 (ref 0–0.4)
EOSINOPHIL NFR BLD AUTO: 1.3 % (ref 0.3–6.2)
ERYTHROCYTE [DISTWIDTH] IN BLOOD BY AUTOMATED COUNT: 17 % (ref 12.3–15.4)
GLOBULIN UR ELPH-MCNC: 4.7 GM/DL
GLUCOSE SERPL-MCNC: 123 MG/DL (ref 65–99)
HCT VFR BLD AUTO: 27.3 % (ref 37.5–51)
HGB BLD-MCNC: 8.4 G/DL (ref 13–17.7)
IMM GRANULOCYTES # BLD AUTO: 0.27 10*3/MM3 (ref 0–0.05)
IMM GRANULOCYTES NFR BLD AUTO: 3 % (ref 0–0.5)
LYMPHOCYTES # BLD AUTO: 2 10*3/MM3 (ref 0.7–3.1)
LYMPHOCYTES NFR BLD AUTO: 22.2 % (ref 19.6–45.3)
MCH RBC QN AUTO: 24.3 PG (ref 26.6–33)
MCHC RBC AUTO-ENTMCNC: 30.8 G/DL (ref 31.5–35.7)
MCV RBC AUTO: 79.1 FL (ref 79–97)
MONOCYTES # BLD AUTO: 1.21 10*3/MM3 (ref 0.1–0.9)
MONOCYTES NFR BLD AUTO: 13.4 % (ref 5–12)
MRSA DNA SPEC QL NAA+PROBE: NEGATIVE
NEUTROPHILS NFR BLD AUTO: 5.4 10*3/MM3 (ref 1.7–7)
NEUTROPHILS NFR BLD AUTO: 60 % (ref 42.7–76)
NRBC BLD AUTO-RTO: 0 /100 WBC (ref 0–0.2)
PLATELET # BLD AUTO: 367 10*3/MM3 (ref 140–450)
PMV BLD AUTO: 8.8 FL (ref 6–12)
POTASSIUM SERPL-SCNC: 4.4 MMOL/L (ref 3.5–5.2)
PROT SERPL-MCNC: 7.3 G/DL (ref 6–8.5)
RBC # BLD AUTO: 3.45 10*6/MM3 (ref 4.14–5.8)
SODIUM SERPL-SCNC: 128 MMOL/L (ref 136–145)
WBC NRBC COR # BLD: 9.01 10*3/MM3 (ref 3.4–10.8)

## 2022-12-09 PROCEDURE — 25010000002 LINEZOLID 600 MG/300ML SOLUTION: Performed by: INTERNAL MEDICINE

## 2022-12-09 PROCEDURE — 25010000002 ALTEPLASE 2 MG RECONSTITUTED SOLUTION: Performed by: INTERNAL MEDICINE

## 2022-12-09 PROCEDURE — 97165 OT EVAL LOW COMPLEX 30 MIN: CPT

## 2022-12-09 PROCEDURE — 85025 COMPLETE CBC W/AUTO DIFF WBC: CPT | Performed by: INTERNAL MEDICINE

## 2022-12-09 PROCEDURE — 99232 SBSQ HOSP IP/OBS MODERATE 35: CPT | Performed by: INTERNAL MEDICINE

## 2022-12-09 PROCEDURE — 25010000002 PIPERACILLIN SOD-TAZOBACTAM PER 1 G: Performed by: INTERNAL MEDICINE

## 2022-12-09 PROCEDURE — 97161 PT EVAL LOW COMPLEX 20 MIN: CPT

## 2022-12-09 PROCEDURE — 87641 MR-STAPH DNA AMP PROBE: CPT | Performed by: INTERNAL MEDICINE

## 2022-12-09 PROCEDURE — 99233 SBSQ HOSP IP/OBS HIGH 50: CPT | Performed by: INTERNAL MEDICINE

## 2022-12-09 PROCEDURE — 25010000002 CEFTRIAXONE PER 250 MG: Performed by: INTERNAL MEDICINE

## 2022-12-09 PROCEDURE — 80053 COMPREHEN METABOLIC PANEL: CPT | Performed by: INTERNAL MEDICINE

## 2022-12-09 PROCEDURE — 71045 X-RAY EXAM CHEST 1 VIEW: CPT

## 2022-12-09 RX ORDER — METRONIDAZOLE 500 MG/1
500 TABLET ORAL EVERY 8 HOURS SCHEDULED
Status: DISCONTINUED | OUTPATIENT
Start: 2022-12-09 | End: 2022-12-12

## 2022-12-09 RX ADMIN — Medication 10 ML: at 08:11

## 2022-12-09 RX ADMIN — METRONIDAZOLE 500 MG: 500 TABLET ORAL at 22:18

## 2022-12-09 RX ADMIN — TRAMADOL HYDROCHLORIDE 50 MG: 50 TABLET, COATED ORAL at 23:39

## 2022-12-09 RX ADMIN — TAZOBACTAM SODIUM AND PIPERACILLIN SODIUM 4.5 G: 500; 4 INJECTION, SOLUTION INTRAVENOUS at 13:53

## 2022-12-09 RX ADMIN — LINEZOLID 600 MG: 600 INJECTION, SOLUTION INTRAVENOUS at 03:33

## 2022-12-09 RX ADMIN — DORNASE ALFA 5 MG: 1 SOLUTION RESPIRATORY (INHALATION) at 23:51

## 2022-12-09 RX ADMIN — CEFTRIAXONE 2 G: 2 INJECTION, POWDER, FOR SOLUTION INTRAMUSCULAR; INTRAVENOUS at 21:18

## 2022-12-09 RX ADMIN — ATORVASTATIN CALCIUM 20 MG: 20 TABLET, FILM COATED ORAL at 08:11

## 2022-12-09 RX ADMIN — FINASTERIDE 5 MG: 5 TABLET, FILM COATED ORAL at 08:10

## 2022-12-09 RX ADMIN — METOPROLOL SUCCINATE 100 MG: 100 TABLET, EXTENDED RELEASE ORAL at 08:10

## 2022-12-09 RX ADMIN — LINEZOLID 600 MG: 600 INJECTION, SOLUTION INTRAVENOUS at 16:59

## 2022-12-09 RX ADMIN — DORNASE ALFA 5 MG: 1 SOLUTION RESPIRATORY (INHALATION) at 13:20

## 2022-12-09 RX ADMIN — ACETAMINOPHEN 650 MG: 325 TABLET, FILM COATED ORAL at 13:53

## 2022-12-09 RX ADMIN — LISINOPRIL 40 MG: 40 TABLET ORAL at 08:11

## 2022-12-09 RX ADMIN — TAZOBACTAM SODIUM AND PIPERACILLIN SODIUM 4.5 G: 500; 4 INJECTION, SOLUTION INTRAVENOUS at 04:59

## 2022-12-09 NOTE — PROGRESS NOTES
[facility] Medicine Services  PROGRESS NOTE    Patient Name: Rhett Isaacs  : 1950  MRN: [id number]    Date of Admission: 2022  Primary Care Physician: Elieser Delvalle MD    Subjective   Subjective     CC:  S/p chest tube    HPI:  Doing okay this am, denies any issues overnight.     ROS:  Gen- No fevers, chills  CV- No chest pain, palpitations  Resp- No cough, dyspnea  GI- No N/V/D, abd pain        Objective   Objective     Vital Signs:   Temp:  [96.8 °F (36 °C)-98.8 °F (37.1 °C)] 98.8 °F (37.1 °C)  Heart Rate:  [] 96  Resp:  [14-22] 20  BP: (105-135)/(60-89) 132/77  Flow (L/min):  [2] 2     Physical Exam:  Constitutional: No acute distress, awake, alert  HENT: NCAT, mucous membranes moist  Respiratory: Clear to auscultation bilaterally, respiratory effort normal, left CT in place  Cardiovascular: RRR, no murmurs, rubs, or gallops  Gastrointestinal: Positive bowel sounds, soft, nontender, nondistended  Musculoskeletal: No bilateral ankle edema  Psychiatric: Appropriate affect, cooperative  Neurologic: Oriented x 3, strength symmetric in all extremities, Cranial Nerves grossly intact to confrontation, speech clear  Skin: No rashes    Results Reviewed:  LAB RESULTS:      Lab 22  0722   WBC 9.01 9.52   HEMOGLOBIN 8.4* 9.4*   HEMATOCRIT 27.3* 29.7*   PLATELETS 367 390   NEUTROS ABS 5.40 6.38   IMMATURE GRANS (ABS) 0.27* 0.18*   LYMPHS ABS 2.00 1.75   MONOS ABS 1.21* 1.13*   EOS ABS 0.12 0.06   MCV 79.1 79.0   SED RATE  --  87*   CRP  --  10.34*   PROTIME  --  15.3*         Lab 22  0523 22  0722   SODIUM 128* 130*   POTASSIUM 4.4 4.0   CHLORIDE 94* 93*   CO2 27.0 25.0   ANION GAP 7.0 12.0   BUN 7* 10   CREATININE 0.74* 0.55*   EGFR 95.7 104.6   GLUCOSE 123* 131*   CALCIUM 9.1 9.4   TSH  --  1.410             Lab 22   PROTIME 15.3*   INR 1.22*             Lab 22   IRON 19*   IRON SATURATION 7*   TIBC 259*    TRANSFERRIN 174*   FERRITIN 869.20*         Brief Urine Lab Results     None          Microbiology Results Abnormal     None          CT Guided Chest Tube    Result Date: 12/8/2022  CT GUIDED ABSCESS DRAIN LUNG-, CT GUIDED CHEST TUBE PLACEMENT-                                                         History: left lung mass; R91.8-Other nonspecific abnormal finding of lung field    : Ollie Segura MD.                                                                            Modality: Computed tomography  Radiation dose for this procedure was 463 mGy-cm.           DOSE REDUCTION: The examination was performed according to departmental dose-optimization program which includes automated exposure control, adjustment of the mA and/or kV according to patient size and/or use of iterative reconstruction technique.                                                                                         SEDATION: Moderate sedation was administered. 2 milligram of Versed and 150 micrograms of fentanyl IV was used for moderate sedation monitored under my direction. Total intra service time of sedation was 35 minutes. The patient's vital signs were monitored throughout the procedure and recorded in the patient's medical record by the nurse.  Anesthesia: Lidocaine, local infiltration.  Estimated blood loss:  < 5 cc.          Technique: A thorough discussion of the risks, benefits, and alternatives of the procedure, and if applicable, moderate sedation, was carried out with the patient. They were encouraged to ask any questions. Any questions were answered. They verbalized understanding. A written informed consent was then signed.   A multi-component timeout was performed prior to starting the procedure using the departmental protocol.  The procedure room personnel used personal protective equipment. The operators used sterile gloves and if indicated, sterile gowns. The surgical site was prepped with  chlorhexidine gluconate  and draped in the maximal applicable sterile fashion.  The patient was positioned supine on the CT table. A preliminary CT was performed to assess the target and determine a safe access site, distance and angle to the target. A left anterior intercostal intercostal access site was selected. The site was sterilely prepped with chlorhexidine gluconate and draped. After local anesthesia infiltration, a dermatotomy was performed.  Using aseptic precautions, under CT guidance, the target lesion was accessed with a 17-gauge guide. The lesion is apparently necrotic on the CT. Therefore I aspirated first and recovered approximately 13 cc of thick yellowish-greenish pus. The specimen was labeled and sent to the lab. The guide was withdrawn and an aseptic dressing applied.  The left pleural effusion seen on 10/26/2022 chest CT has progressed into a large left posterior/lateral loculated fluid collection, most likely an empyema with thick walls. I discussed the case with Dr. Barksdale and we agreed to place a small bore pleural drain. The patient was rolled up on the right side. Using CT guidance, after local anesthesia and dermatotomy, an 18-gauge Chiba needle was advanced into the collection with recovery of pus. Over a guidewire, an 8.5 Qatari locking pigtail catheter was advanced into the empyema, the pigtail locked and the catheter connected to a Pleur-evac. The catheter was secured to skin with nonabsorbable suture and stay fix.  The patient tolerated the procedure well. After recovery, the patient was discharged from the department in stable condition. The agreed-upon plan is to admit the patient to the hospitalist service with follow-up by pulmonary service.           Complications: None immediate. There is replacement of the pus in the left lingular lobe lung abscess by air. The abscess appears much smaller. The pigtail catheter is in good position.  Cores: Number of Cores if obtained: 2   Specimen: The specimen was labeled and sent to the lab in appropriate carriers & containers if such was requested by the ordering provider.                                                               Impression: Impression:                                                              Successful CT guided aspiration of a left lung mass that turned out to be a lung abscess, likely related to the history of prior aspiration.  Successful CT-guided placement of an 8.5 Uzbek drainage catheter in the left empyema.  Thank you for the opportunity to assist in the care of your patient.  This report was finalized on 12/8/2022 11:58 AM by Ollie Segura MD.      CT Guided Abscess Drain Lung    Result Date: 12/8/2022  CT GUIDED ABSCESS DRAIN LUNG-, CT GUIDED CHEST TUBE PLACEMENT-                                                         History: left lung mass; R91.8-Other nonspecific abnormal finding of lung field    : Ollie Segura MD.                                                                            Modality: Computed tomography  Radiation dose for this procedure was 463 mGy-cm.           DOSE REDUCTION: The examination was performed according to departmental dose-optimization program which includes automated exposure control, adjustment of the mA and/or kV according to patient size and/or use of iterative reconstruction technique.                                                                                         SEDATION: Moderate sedation was administered. 2 milligram of Versed and 150 micrograms of fentanyl IV was used for moderate sedation monitored under my direction. Total intra service time of sedation was 35 minutes. The patient's vital signs were monitored throughout the procedure and recorded in the patient's medical record by the nurse.  Anesthesia: Lidocaine, local infiltration.  Estimated blood loss:  < 5 cc.          Technique: A thorough discussion of the risks, benefits, and  alternatives of the procedure, and if applicable, moderate sedation, was carried out with the patient. They were encouraged to ask any questions. Any questions were answered. They verbalized understanding. A written informed consent was then signed.   A multi-component timeout was performed prior to starting the procedure using the departmental protocol.  The procedure room personnel used personal protective equipment. The operators used sterile gloves and if indicated, sterile gowns. The surgical site was prepped with chlorhexidine gluconate  and draped in the maximal applicable sterile fashion.  The patient was positioned supine on the CT table. A preliminary CT was performed to assess the target and determine a safe access site, distance and angle to the target. A left anterior intercostal intercostal access site was selected. The site was sterilely prepped with chlorhexidine gluconate and draped. After local anesthesia infiltration, a dermatotomy was performed.  Using aseptic precautions, under CT guidance, the target lesion was accessed with a 17-gauge guide. The lesion is apparently necrotic on the CT. Therefore I aspirated first and recovered approximately 13 cc of thick yellowish-greenish pus. The specimen was labeled and sent to the lab. The guide was withdrawn and an aseptic dressing applied.  The left pleural effusion seen on 10/26/2022 chest CT has progressed into a large left posterior/lateral loculated fluid collection, most likely an empyema with thick walls. I discussed the case with Dr. Barksdale and we agreed to place a small bore pleural drain. The patient was rolled up on the right side. Using CT guidance, after local anesthesia and dermatotomy, an 18-gauge Chiba needle was advanced into the collection with recovery of pus. Over a guidewire, an 8.5 Croatian locking pigtail catheter was advanced into the empyema, the pigtail locked and the catheter connected to a Pleur-evac. The catheter was secured  to skin with nonabsorbable suture and stay fix.  The patient tolerated the procedure well. After recovery, the patient was discharged from the department in stable condition. The agreed-upon plan is to admit the patient to the hospitalist service with follow-up by pulmonary service.           Complications: None immediate. There is replacement of the pus in the left lingular lobe lung abscess by air. The abscess appears much smaller. The pigtail catheter is in good position.  Cores: Number of Cores if obtained: 2  Specimen: The specimen was labeled and sent to the lab in appropriate carriers & containers if such was requested by the ordering provider.                                                               Impression: Impression:                                                              Successful CT guided aspiration of a left lung mass that turned out to be a lung abscess, likely related to the history of prior aspiration.  Successful CT-guided placement of an 8.5 Turkish drainage catheter in the left empyema.  Thank you for the opportunity to assist in the care of your patient.  This report was finalized on 12/8/2022 11:58 AM by Ollie Segura MD.            I have reviewed the medications:  Scheduled Meds:atorvastatin, 20 mg, Oral, Daily  finasteride, 5 mg, Oral, Daily  Linezolid, 600 mg, Intravenous, Q12H  lisinopril, 40 mg, Oral, Q24H  metoprolol succinate XL, 100 mg, Oral, Daily  piperacillin-tazobactam, 4.5 g, Intravenous, Q8H  sodium chloride, 10 mL, Intravenous, Q12H      Continuous Infusions:   PRN Meds:.•  acetaminophen **OR** acetaminophen **OR** acetaminophen  •  ondansetron **OR** ondansetron  •  sodium chloride  •  sodium chloride  •  traMADol    Assessment & Plan   Assessment & Plan     Active Hospital Problems    Diagnosis  POA   • **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        Brief Hospital Course to date:  Rhett Isaacs is a 72 y.o. male nonsmoker w/ PMH of HTN, HLD, BPH and severe GERD symptoms occasional nocturnal vomiting/reflux, who has been evaluated since September 2022 for LT lung lesion now s/p needle aspiration with concern for abscess and subsequent chest tube placement     Assessment/Plan     LT lung lesion  Loculated LT pleural effusion s/p aspiration and chest tube  Lipoid pneumonia?  -s/p bronch w/ Bx 11/9/22 - path c/w lipoid pna, cx data negative  -chest tube placed by IR 12/8/22 w/ milky white drainage, repeat CXR this am shows CT in place per my read.  -cultures sent and pending  -SLP consulted and have signed off  -pulm consulted, Dr. Waldrop added Linezolid and Zosyn.  If MRSA PCR negative, then will d/c Linezolid.     Severe GERD w/ persistent symptoms  Intolerance to PPIs  Abnormal weight loss  New microcytic anemia  -approx 40lb weight loss over 3 months  -needs EGD to r/o GI malignancy, pt/wife prefer opt f/u w/ Dr. Torres  - TSH wnl, CRP elevated, iron profile c/w AOCD     Mild hyponatremia of unclear significance  -poor oral intake for weeks/months  -s/p IVF x1 bag  -hold HCTZ  -- Na slightly worse this am, will monitor for now  -labs in the AM     HTN  HLD  BPH  -home atorvastatin, finasteride, lisinopril, metoprolol xl       Expected Discharge Location and Transportation: home  Expected Discharge Date: 12/12/22    DVT prophylaxis:  Mechanical DVT prophylaxis orders are present.          CODE STATUS:   Code Status and Medical Interventions:   Ordered at: 12/08/22 1258     Code Status (Patient has no pulse and is not breathing):    CPR (Attempt to Resuscitate)     Medical Interventions (Patient has pulse or is breathing):    Full Support       Alissa Ashley MD  12/09/22

## 2022-12-09 NOTE — CASE MANAGEMENT/SOCIAL WORK
Discharge Planning Assessment  [facility]     Patient Name: Rhett Isaacs  MRN: [id number]  Today's Date: 12/9/2022    Admit Date: 12/8/2022    Plan: Home   Discharge Needs Assessment     Row Name 12/09/22 0943       Living Environment    People in Home spouse    Current Living Arrangements home    Primary Care Provided by self    Provides Primary Care For no one    Family Caregiver if Needed spouse    Quality of Family Relationships involved;supportive    Able to Return to Prior Arrangements yes       Resource/Environmental Concerns    Resource/Environmental Concerns none       Transition Planning    Patient/Family Anticipates Transition to home with family    Patient/Family Anticipated Services at Transition ;rehabilitation services    Transportation Anticipated family or friend will provide       Discharge Needs Assessment    Readmission Within the Last 30 Days no previous admission in last 30 days    Equipment Currently Used at Home walker, rolling    Concerns to be Addressed discharge planning    Anticipated Changes Related to Illness none               Discharge Plan     Row Name 12/09/22 0944       Plan    Plan Home    Patient/Family in Agreement with Plan yes    Plan Comments Spoke with patient in room to initiate discharge planning.  He lives with his wife in [facility] [place].  Prior to admission, he was independent with ADL's, ocassionally using a rolling walker to assist with ambulation.  He has no other DME at home and is not current with home health.  He does not have an advanced directive.  His PCP is Jose Delvalle.  Mr. Isaacs has RX coverage and has his scripts filled at [facility] [place].  His goal is to return home at discharge.  Will await therapy recommendations to determine proper discharge placement.  CM will continue to follow.    Final Discharge Disposition Code 01 - home or self-care              Continued Care and Services - Admitted Since 12/8/2022    Coordination has not been  started for this encounter.       Expected Discharge Date and Time     Expected Discharge Date Expected Discharge Time    Dec 14, 2022          Demographic Summary     Row Name 12/09/22 0942       General Information    Admission Type inpatient    Arrived From other (see comments)  [facility]    Referral Source admission list    Reason for Consult discharge planning    Preferred Language English               Functional Status     Row Name 12/09/22 0943       Functional Status    Usual Activity Tolerance fair    Current Activity Tolerance fair       Functional Status, IADL    Medications assistive person    Meal Preparation completely dependent    Housekeeping completely dependent    Laundry completely dependent    Shopping completely dependent               Psychosocial    No documentation.                Abuse/Neglect    No documentation.                Legal    No documentation.                Substance Abuse    No documentation.                Patient Forms    No documentation.                   Michelle Steward RN

## 2022-12-09 NOTE — THERAPY EVALUATION
Patient Name: Rhett Isaacs  : 1950    MRN: [id number]                              Today's Date: 2022       Admit Date: 2022    Visit Dx: No diagnosis found.  Patient Active Problem List   Diagnosis   • Benign hypertrophy of prostate   • Mixed hyperlipidemia   • Essential hypertension, benign   • Lung mass   • Lipoid pneumonia (HCC)   • Chest tube in place   • Weight loss   • Empyema lung (HCC)     Past Medical History:   Diagnosis Date   • Acute bronchitis    • Acute prostatitis    • Acute serous otitis media    • Benign prostatic hyperplasia    • Cancer (HCC) 2015    Basil cell carcinoma   • Cataract     still have them- bilat   • Colon polyp 10/2017   • Diabetes mellitus (HCC) 1967    doesnt checks sugar   • Diverticulosis 2004    Found in first colonoscopy   • Dysuria    • Fatigue    • GERD (gastroesophageal reflux disease)    • Hyperlipidemia    • Hypertension    • Impacted cerumen    • Prostatitis    • PVC (premature ventricular contraction)    • Wears glasses     readers     Past Surgical History:   Procedure Laterality Date   • ADENOIDECTOMY     • BASAL CELL CARCINOMA EXCISION     • BRONCHOSCOPY N/A 2022    Procedure: BRONCHOSCOPY WITH ENDOBRONCHIAL ULTRASOUND WITH FLUOROSCOPY;  Surgeon: Jeremias Hughes MD;  Location: [facility];  Service: Pulmonary;  Laterality: N/A;  EBUS balloon removed and intact   • COLONOSCOPY  10/23/2017   • EYE SURGERY  2015    Basil cell carcenoma   • TONSILLECTOMY     • WISDOM TOOTH EXTRACTION        General Information     Row Name 22          Physical Therapy Time and Intention    Document Type evaluation  -CM     Mode of Treatment physical therapy  -CM     Row Name 22          General Information    Patient Profile Reviewed yes  -CM     Prior Level of Function independent:;all household mobility;ADL's;min assist:;home management;dependent:;driving  independent with FWW  -CM     Existing  Precautions/Restrictions fall  L chest tube  -CM     Barriers to Rehab none identified  -CM     Row Name 12/09/22 0934          Living Environment    People in Home spouse  -CM     Row Name 12/09/22 0934          Home Main Entrance    Number of Stairs, Main Entrance one  -CM     Stair Railings, Main Entrance railings on both sides of stairs  -CM     Row Name 12/09/22 0934          Stairs Within Home, Primary    Number of Stairs, Within Home, Primary none  -CM     Row Name 12/09/22 0934          Cognition    Orientation Status (Cognition) oriented x 4  -CM     Row Name 12/09/22 0934          Safety Issues, Functional Mobility    Safety Issues Affecting Function (Mobility) safety precaution awareness  -CM     Impairments Affecting Function (Mobility) balance;endurance/activity tolerance  -CM           User Key  (r) = Recorded By, (t) = Taken By, (c) = Cosigned By    Initials Name Provider Type    CM Lisandra Allen, PT Physical Therapist               Mobility     Row Name 12/09/22 0936          Bed Mobility    Bed Mobility supine-sit  -CM     Supine-Sit [place] (Bed Mobility) minimum assist (75% patient effort)  -CM     Assistive Device (Bed Mobility) bed rails;head of bed elevated  -CM     Comment, (Bed Mobility) Verbal cues for use of bedrails with BUE, patient requires Sylvia at trunk to raise from bed, able to manage BLE without physical assistance  -CM     Row Name 12/09/22 0936          Sit-Stand Transfer    Sit-Stand [place] (Transfers) contact guard  -CM     Assistive Device (Sit-Stand Transfers) walker, front-wheeled  -CM     Comment, (Sit-Stand Transfer) VCs for safe hand placement  -CM     Row Name 12/09/22 0936          Gait/Stairs (Locomotion)    [place] Level (Gait) supervision  -CM     Assistive Device (Gait) walker, front-wheeled  -CM     Distance in Feet (Gait) 20+20  -CM     Deviations/Abnormal Patterns (Gait) gait speed decreased;stride length decreased  -CM     Comment,  (Gait/Stairs) Patient ambulated in room with step through gait pattern to bathroom where he took seated rest break and back to chair. He demonstrates good sequencing with FWW and did not experience any LOB. All VSS during/following activity  -CM           User Key  (r) = Recorded By, (t) = Taken By, (c) = Cosigned By    Initials Name Provider Type    Lisandra Bustos PT Physical Therapist               Obj/Interventions     Row Name 12/09/22 0939          Range of Motion Comprehensive    General Range of Motion bilateral lower extremity ROM WFL  -CM     Row Name 12/09/22 0939          Strength Comprehensive (MMT)    Comment, General Manual Muscle Testing (MMT) Assessment BLE grossly 4/5  -CM     Row Name 12/09/22 0939          Balance    Balance Assessment sitting static balance;standing static balance;standing dynamic balance  -CM     Static Sitting Balance independent  -CM     Position, Sitting Balance unsupported;sitting edge of bed  -CM     Static Standing Balance standby assist  -CM     Dynamic Standing Balance supervision  -CM     Position/Device Used, Standing Balance supported;walker, front-wheeled  -CM     Row Name 12/09/22 0939          Sensory Assessment (Somatosensory)    Sensory Assessment (Somatosensory) LE sensation intact  -CM           User Key  (r) = Recorded By, (t) = Taken By, (c) = Cosigned By    Initials Name Provider Type    Lisandra Bustos PT Physical Therapist               Goals/Plan     Row Name 12/09/22 0942          Bed Mobility Goal 1 (PT)    Activity/Assistive Device (Bed Mobility Goal 1, PT) sit to supine;supine to sit  -CM     [place] Level/Cues Needed (Bed Mobility Goal 1, PT) independent  -CM     Time Frame (Bed Mobility Goal 1, PT) long term goal (LTG);10 days  -CM     Row Name 12/09/22 0942          Transfer Goal 1 (PT)    Activity/Assistive Device (Transfer Goal 1, PT) sit-to-stand/stand-to-sit;bed-to-chair/chair-to-bed  -CM     [place] Level/Cues  "Needed (Transfer Goal 1, PT) independent  -CM     Time Frame (Transfer Goal 1, PT) long term goal (LTG);10 days  -CM     Row Name 12/09/22 0942          Gait Training Goal 1 (PT)    Activity/Assistive Device (Gait Training Goal 1, PT) gait (walking locomotion);assistive device use;increase endurance/gait distance;improve balance and speed  -CM     [place] Level (Gait Training Goal 1, PT) modified independence  -CM     Distance (Gait Training Goal 1, PT) 350  -CM     Time Frame (Gait Training Goal 1, PT) long term goal (LTG);10 days  -CM     Row Name 12/09/22 0942          Stairs Goal 1 (PT)    Activity/Assistive Device (Stairs Goal 1, PT) ascending stairs;descending stairs;using handrail, left;using handrail, right  -CM     [place] Level/Cues Needed (Stairs Goal 1, PT) modified independence  -CM     Number of Stairs (Stairs Goal 1, PT) 1  -CM     Row Name 12/09/22 0942          Therapy Assessment/Plan (PT)    Planned Therapy Interventions (PT) balance training;bed mobility training;gait training;home exercise program;postural re-education;patient/family education;neuromuscular re-education;motor coordination training;ROM (range of motion);stair training;strengthening;transfer training  -CM           User Key  (r) = Recorded By, (t) = Taken By, (c) = Cosigned By    Initials Name Provider Type    CM Lisandra Allen, PT Physical Therapist               Clinical Impression     Row Name 12/09/22 0939          Pain    Pretreatment Pain Rating 0/10 - no pain  -CM     Posttreatment Pain Rating 0/10 - no pain  -CM     Pre/Posttreatment Pain Comment patient denies pain but does describe \"soreness\" at chest tube site  -CM     Row Name 12/09/22 0939          Plan of Care Review    Plan of Care Reviewed With patient;spouse  -CM     Outcome Evaluation Patient presents with mild deficits in balance and endurance. Today he ambulated in room 20'+20' with FWW and supervision. He will benefit from IPPT to address " deficits and enhance functional mobility. Will recommend that he D/C home with assist.  -CM     Row Name 12/09/22 0939          Therapy Assessment/Plan (PT)    Rehab Potential (PT) good, to achieve stated therapy goals  -CM     Criteria for Skilled Interventions Met (PT) yes;meets criteria  -CM     Therapy Frequency (PT) daily  -CM     Row Name 12/09/22 0939          Vital Signs    Pre Systolic BP Rehab 120  -CM     Pre Treatment Diastolic BP 72  -CM     Post Systolic BP Rehab 123  -CM     Post Treatment Diastolic BP 70  -CM     Pretreatment Heart Rate (beats/min) 97  -CM     Posttreatment Heart Rate (beats/min) 104  -CM     Pre SpO2 (%) 96  -CM     O2 Delivery Pre Treatment room air  -CM     Post SpO2 (%) 94  -CM     O2 Delivery Post Treatment room air  -CM     Pre Patient Position Supine  -CM     Intra Patient Position Standing  -CM     Post Patient Position Sitting  -CM     Row Name 12/09/22 0939          Positioning and Restraints    Pre-Treatment Position in bed  -CM     Post Treatment Position chair  -CM     In Chair reclined;call light within reach;encouraged to call for assist;with family/caregiver;notified nsg  chest tube to suction, wife present  -CM           User Key  (r) = Recorded By, (t) = Taken By, (c) = Cosigned By    Initials Name Provider Type    CM Lisandra Allen, PT Physical Therapist               Outcome Measures     Row Name 12/09/22 0943          How much help from another person do you currently need...    Turning from your back to your side while in flat bed without using bedrails? 4  -CM     Moving from lying on back to sitting on the side of a flat bed without bedrails? 3  -CM     Moving to and from a bed to a chair (including a wheelchair)? 4  -CM     Standing up from a chair using your arms (e.g., wheelchair, bedside chair)? 3  -CM     Climbing 3-5 steps with a railing? 3  -CM     To walk in hospital room? 4  -CM     AM-PAC 6 Clicks Score (PT) 21  -CM     Highest level of  mobility 6 --> Walked 10 steps or more  -CM     Row Name 12/09/22 0943          Functional Assessment    Outcome Measure Options AM-PAC 6 Clicks Basic Mobility (PT)  -CM           User Key  (r) = Recorded By, (t) = Taken By, (c) = Cosigned By    Initials Name Provider Type    Lisandra Bustos PT Physical Therapist                             Physical Therapy Education     Title: PT OT SLP Therapies (In Progress)     Topic: Physical Therapy (In Progress)     Point: Mobility training (Done)     Learning Progress Summary           Patient Acceptance, E, VU by CM at 12/9/2022 0944   Significant Other Acceptance, E, VU by CM at 12/9/2022 0944                   Point: Home exercise program (Not Started)     Learner Progress:  Not documented in this visit.          Point: Body mechanics (Not Started)     Learner Progress:  Not documented in this visit.          Point: Precautions (Done)     Learning Progress Summary           Patient Acceptance, E, VU by CM at 12/9/2022 0944   Significant Other Acceptance, E, VU by CM at 12/9/2022 0944                               User Key     Initials Effective Dates Name Provider Type Discipline     09/22/22 -  Lisandra Allen PT Physical Therapist PT              PT Recommendation and Plan  Planned Therapy Interventions (PT): balance training, bed mobility training, gait training, home exercise program, postural re-education, patient/family education, neuromuscular re-education, motor coordination training, ROM (range of motion), stair training, strengthening, transfer training  Plan of Care Reviewed With: patient, spouse  Outcome Evaluation: Patient presents with mild deficits in balance and endurance. Today he ambulated in room 20'+20' with FWW and supervision. He will benefit from IPPT to address deficits and enhance functional mobility. Will recommend that he D/C home with assist.     Time Calculation:    PT Charges     Row Name 12/09/22 0945             Time  Calculation    Start Time 0854  -CM      PT Received On 12/09/22  -CM      PT Goal Re-Cert Due Date 12/19/22  -CM         Untimed Charges    PT Eval/Re-eval Minutes 52  -CM         Total Minutes    Untimed Charges Total Minutes 52  -CM       Total Minutes 52  -CM            User Key  (r) = Recorded By, (t) = Taken By, (c) = Cosigned By    Initials Name Provider Type    Lisandra Bustos, PT Physical Therapist              Therapy Charges for Today     Code Description Service Date Service Provider Modifiers Qty    29833514254 HC PT EVAL LOW COMPLEXITY 4 12/9/2022 Lisandra Allen, PT GP 1          PT G-Codes  Outcome Measure Options: AM-PAC 6 Clicks Basic Mobility (PT)  AM-PAC 6 Clicks Score (PT): 21  PT Discharge Summary  Anticipated Discharge Disposition (PT): home with assist    Lisandra Allen PT  12/9/2022

## 2022-12-09 NOTE — CONSULTS
INFECTIOUS DISEASE CONSULT/INITIAL HOSPITAL VISIT    Rhett Isaacs  1950  [id number]    Date of Consult: 12/9/22    Admission Date: 12/8/2022      Requesting Provider: Jeremias Hughes MD  Evaluating Physician: George Underwood MD    Reason for Consultation: empyema    History of present illness:    Patient is a 72 y.o. male with h/o BPH, HTN, and HLD who was admitted to [facility] ED from interventional radiology after chest tube placement.  The patient had an aspiration event after vomiting in his sleep in September 2022.  He was on 2 round of antibiotics and improved.  A CT scan on 10/18 showed a 6.3 cm mass along the left anterior pleural margin of DIEGO with loculated pleural effusion.  He was referred to pulmonology and underwent a bronchoscopy by Dr. Hughes on 11/9.  The pathology showed lipoid pneumonia.  All cultures including AFB were negative.  He underwent a CT-guided aspiration of left lung mass on 12/8 which showed that it was an abscess.  He also had a chest tube placed in the left empyema.  Pleural fluid had 89% neutrophils with cell count pending.  Cultures are negative to date and pending.  He is afebrile.  Pertinent labs were CRP 10.34, ESR 87, Ferritin 869, WBC 9500 with 67% neutrophils, and creatinine 0.55. A CXR showed a new small bore right-sided chest tube in left posterior pleural space with decreased left-sided airspace disease. He is currently on Zosyn and Linezolid.  ID was asked to evaluate and manage his antibiotic therapy. The anterior fluid collection was drained with aspiration by interventional radiology and a chest tube was placed in the posterior collection.  He is now draining thick purulent material from the drainage catheter.  Pulmonary medicine has placed alteplase and Pulmozyme to assist with drainage. Gram stain of his pleural fluid reveals moderate white blood cells with moderate gram-positive cocci in pairs and clusters.  His nasal MRSA PCR is negative.    On  examination, the patient was very uncomfortable as Alteplase and Dornase alpha was injected into the pleural cavity and drain clamped.  His wife indicates that he has progressively worsened over the last several weeks with anorexia, weight loss, malaise, and fatigue.  He has lost over 30 pounds.      Past Medical History:   Diagnosis Date   • Acute bronchitis    • Acute prostatitis    • Acute serous otitis media    • Benign prostatic hyperplasia    • Cancer ([facility]) 09/2015    Basil cell carcinoma   • Cataract     still have them- bilat   • Colon polyp 10/2017   • Diabetes mellitus ([facility]) 06/1967    doesnt checks sugar   • Diverticulosis 2004    Found in first colonoscopy   • Dysuria    • Fatigue    • GERD (gastroesophageal reflux disease) 2016   • Hyperlipidemia    • Hypertension    • Impacted cerumen    • Prostatitis    • PVC (premature ventricular contraction)    • Wears glasses     readers       Past Surgical History:   Procedure Laterality Date   • ADENOIDECTOMY  1956   • BASAL CELL CARCINOMA EXCISION  2015   • BRONCHOSCOPY N/A 11/9/2022    Procedure: BRONCHOSCOPY WITH ENDOBRONCHIAL ULTRASOUND WITH FLUOROSCOPY;  Surgeon: Jeremias Hughes MD;  Location: [facility] ENDOSCOPY;  Service: Pulmonary;  Laterality: N/A;  EBUS balloon removed and intact   • COLONOSCOPY  10/23/2017   • EYE SURGERY  September 2015    Basil cell carcenoma   • TONSILLECTOMY     • WISDOM TOOTH EXTRACTION         Family History   Problem Relation Age of Onset   • Alcohol abuse Father    • Hyperlipidemia Mother    • Hypertension Brother        Social History     Socioeconomic History   • Marital status:    Tobacco Use   • Smoking status: Never   • Smokeless tobacco: Never   Vaping Use   • Vaping Use: Never used   Substance and Sexual Activity   • Alcohol use: No   • Drug use: No   • Sexual activity: Never       Allergies   Allergen Reactions   • Penicillins Diarrhea   • Cardizem [Diltiazem Hcl] Rash   • Sulfa Antibiotics Rash          Medication:    Current Facility-Administered Medications:   •  acetaminophen (TYLENOL) tablet 650 mg, 650 mg, Oral, Q4H PRN, 650 mg at 12/09/22 1353 **OR** acetaminophen (TYLENOL) 160 MG/5ML solution 650 mg, 650 mg, Oral, Q4H PRN **OR** acetaminophen (TYLENOL) suppository 650 mg, 650 mg, Rectal, Q4H PRN, Kaleb Chapman DO  •  atorvastatin (LIPITOR) tablet 20 mg, 20 mg, Oral, Daily, Kaleb Chapman DO, 20 mg at 12/09/22 0811  •  cefTRIAXone (ROCEPHIN) 2 g/100 mL 0.9% NS IVPB (MBP), 2 g, Intravenous, Q24H, George Underwood MD  •  [COMPLETED] alteplase (ACTIVASE) 10 mg in 0.9% NaCl 30 mL syringe, 10 mg, Intrapleural, Once, 10 mg at 12/09/22 1320 **AND** dornase alpha (PULMOZYME) 5 mg in sterile water (preservative free) 30 mL, 5 mg, Intrapleural, BID, Jeremias Hughes MD, 5 mg at 12/09/22 1320  •  finasteride (PROSCAR) tablet 5 mg, 5 mg, Oral, Daily, Kaleb Chapman DO, 5 mg at 12/09/22 0810  •  lisinopril (PRINIVIL,ZESTRIL) tablet 40 mg, 40 mg, Oral, Q24H, Kaleb Chapman DO, 40 mg at 12/09/22 0811  •  metoprolol succinate XL (TOPROL-XL) 24 hr tablet 100 mg, 100 mg, Oral, Daily, Kaleb Chapman DO, 100 mg at 12/09/22 0810  •  metroNIDAZOLE (FLAGYL) tablet 500 mg, 500 mg, Oral, Q8H, George Underwood MD  •  ondansetron (ZOFRAN) tablet 4 mg, 4 mg, Oral, Q6H PRN **OR** ondansetron (ZOFRAN) injection 4 mg, 4 mg, Intravenous, Q6H PRN, Kaleb Chapman DO  •  sodium chloride 0.9 % flush 10 mL, 10 mL, Intravenous, Q12H, Kaleb Chapman DO, 10 mL at 12/09/22 0811  •  sodium chloride 0.9 % flush 10 mL, 10 mL, Intravenous, PRN, Kaleb Chapman DO  •  sodium chloride 0.9 % infusion 40 mL, 40 mL, Intravenous, PRN, Kaleb Chapman DO  •  traMADol (ULTRAM) tablet 50 mg, 50 mg, Oral, Q6H PRN, Kaleb Chapman DO, 50 mg at 12/08/22 1846    Antibiotics:  Anti-Infectives (From admission, onward)    Ordered     Dose/Rate Route Frequency Start Stop    12/09/22 1527   metroNIDAZOLE (FLAGYL) tablet 500 mg        Ordering Provider: George Underwood MD    500 mg Oral Every 8 Hours Scheduled 22 2200 22 2159    22 1916  cefTRIAXone (ROCEPHIN) 2 g/100 mL 0.9% NS IVPB (MBP)        Ordering Provider: George Underwood MD    2 g  over 30 Minutes Intravenous Every 24 Hours 22 2100 22 2059    22 1335  piperacillin-tazobactam (ZOSYN) 4.5 g in iso-osmotic dextrose 100 mL IVPB (premix)        Ordering Provider: Jeremias Hughes MD    4.5 g  over 30 Minutes Intravenous Once 22 1430 22 1606            Review of Systems:  Constitutional-- Anorexia malaise, weight loss, and fatigue.  HEENT-- No new vision, hearing or throat complaints.  No epistaxis or oral sores.  Denies odynophagia or dysphagia. No headache, photophobia or neck stiffness.  CV-- No chest pain, palpitation or syncope  Resp-- + SOB/cough/no Hemoptysis  GI- No nausea, vomiting, or diarrhea.  No hematochezia, melena, or hematemesis. Denies jaundice or chronic liver disease.  -- No dysuria, hematuria, or flank pain.  Denies hesitancy, urgency or burning with urination.   Lymph- no swollen lymph nodes in neck/axilla or groin.   Heme- No active bruising or bleeding; no Hx of DVT or PE.  MS-- no swelling or pain in the bones or joints of arms/legs.  No new back pain.  Neuro-- No acute focal weakness or numbness in the arms or legs.  No seizures.  Skin--No rashes or lesions      Physical Exam:   Vital Signs  Temp (24hrs), Av.3 °F (36.8 °C), Min:97.7 °F (36.5 °C), Max:98.8 °F (37.1 °C)    Temp  Min: 97.7 °F (36.5 °C)  Max: 98.8 °F (37.1 °C)  BP  Min: 106/67  Max: 135/89  Pulse  Min: 82  Max: 102  Resp  Min: 16  Max: 20  SpO2  Min: 92 %  Max: 96 %    GENERAL: Awake and alert, in moderate distress. Frail appearing.   HEENT: Normocephalic, atraumatic.  PERRL. EOMI. No conjunctival injection. No icterus. Oropharynx clear without evidence of thrush or exudate.  NECK: Supple  LYMPH:  No cervical, axillary or inguinal lymphadenopathy.  HEART: RRR; No murmur, rubs, gallops.   LUNGS: diminished in left lung field, scattered rales in right lung field without wheezing. Normal respiratory effort. Nonlabored. Chest tube in place in the left posterior chest.  ABDOMEN: Soft, nontender, nondistended.  No rebound or guarding. NO mass or HSM.  EXT:  No cyanosis, clubbing or edema. No cord.  :  Without Rivera catheter.  MSK: No joint effusions or erythema  SKIN: Warm and dry without cutaneous eruptions on Inspection/palpation.    NEURO: Oriented to PPT.  Motor 5/5 strength  PSYCHIATRIC: Normal insight and judgment. Cooperative with PE    Laboratory Data    Results from last 7 days   Lab Units 12/09/22  0523 12/08/22  0722   WBC 10*3/mm3 9.01 9.52   HEMOGLOBIN g/dL 8.4* 9.4*   HEMATOCRIT % 27.3* 29.7*   PLATELETS 10*3/mm3 367 390     Results from last 7 days   Lab Units 12/09/22  0523   SODIUM mmol/L 128*   POTASSIUM mmol/L 4.4   CHLORIDE mmol/L 94*   CO2 mmol/L 27.0   BUN mg/dL 7*   CREATININE mg/dL 0.74*   GLUCOSE mg/dL 123*   CALCIUM mg/dL 9.1     Results from last 7 days   Lab Units 12/09/22  0523   ALK PHOS U/L 96   BILIRUBIN mg/dL 0.4   ALT (SGPT) U/L 23   AST (SGOT) U/L 17     Results from last 7 days   Lab Units 12/08/22  0722   SED RATE mm/hr 87*     Results from last 7 days   Lab Units 12/08/22  0722   CRP mg/dL 10.34*                 Estimated Creatinine Clearance: 81.6 mL/min (A) (by C-G formula based on SCr of 0.74 mg/dL (L)).      Microbiology:  Microbiology Results (last 10 days)     Procedure Component Value - Date/Time    MRSA Screen, PCR (Inpatient) - Swab, Nares [[id number]]  (Normal) Collected: 12/09/22 1209    Lab Status: Final result Specimen: Swab from Nares Updated: 12/09/22 1424     MRSA PCR Negative    Narrative:      The negative predictive value of this diagnostic test is high and should only be used to consider de-escalating anti-MRSA therapy. A positive result may indicate  colonization with MRSA and must be correlated clinically.  MRSA Negative    Body Fluid Culture - Body Fluid, Lung, Left Lower Lobe [[id number]] Collected: 12/08/22 1143    Lab Status: Preliminary result Specimen: Body Fluid from Lung, Left Lower Lobe Updated: 12/09/22 0822     Body Fluid Culture No growth     Gram Stain Many (4+) WBCs seen      No organisms seen    Body Fluid Culture - Body Fluid, Pleural Cavity [[id number]] Collected: 12/08/22 1138    Lab Status: Preliminary result Specimen: Body Fluid from Pleural Cavity Updated: 12/09/22 0821     Body Fluid Culture No growth     Gram Stain Moderate (3+) WBCs seen      Moderate (3+) Gram positive cocci in pairs and clusters                Radiology:  XR Chest 1 View    Result Date: 12/9/2022  1. New small bore right-sided chest tube likely within the posterior left pleural space. Decreased left-sided airspace disease compared to the prior exam.  This report was finalized on 12/9/2022 7:28 AM by Gio Barney MD.      CT Guided Chest Tube    Result Date: 12/8/2022  Impression:                                                              Successful CT guided aspiration of a left lung mass that turned out to be a lung abscess, likely related to the history of prior aspiration.  Successful CT-guided placement of an 8.5 Moldovan drainage catheter in the left empyema.  Thank you for the opportunity to assist in the care of your patient.  This report was finalized on 12/8/2022 11:58 AM by Ollie Segura MD.      CT Guided Abscess Drain Lung    Result Date: 12/8/2022  Impression:                                                              Successful CT guided aspiration of a left lung mass that turned out to be a lung abscess, likely related to the history of prior aspiration.  Successful CT-guided placement of an 8.5 Moldovan drainage catheter in the left empyema.  Thank you for the opportunity to assist in the care of your patient.  This report was finalized on 12/8/2022  11:58 AM by Ollie Segura MD.    I read his radiographic images. I reviewed the CT scan images with the patient and his wife.      Impression:   1. Left empyemas-he has 2 loculated areas of empyema in the left hemithorax with the largest area posteriorly. These are likely related to aspiration/aspiration pneumonia.  The Gram stain reveals gram-positive cocci with no gram-negative rods.  I will try to simplify his antibiotic therapy to intravenous ceftriaxone and oral metronidazole.  His MRSA nasal PCR is negative, suggesting that MRSA is not involved in his infection.  2. Aspiration pneumonia/lipoid pneumonia  3. Anemia of chronic disease  4. Essential hypertension  5. Benign prostatic hyperplasia  6. Penicillin (diarrhea) and sulfa (rash) allergies.     PLAN/RECOMMENDATIONS:   Thank you for asking us to see Rhett Isaacs, I recommend the followin. Anterior and posterior empyema cultures-pending  2. Discontinue Zyvox  3. Discontinue Zosyn  4. Ceftriaxone 2 g IV daily  5. Metronidazole 500 mg by mouth 3 times a day    George Underwood MD saw and examined patient, verified hx and PE, read all radiographic studies, reviewed labs and micro data, and formulated dx, plan for treatment and all medical decision making.      Anthony Monteiro PA-C for George Underwood MD     I discussed his complex situation at length with him and his wife today.  He will likely require a prolonged course of intravenous antibiotic therapy.  I will plan to give him at least 3 weeks of intravenous antibiotic therapy and I will tentatively plan daily dosing in our office.    George Underwood MD  2022  19:17 EST

## 2022-12-09 NOTE — PROGRESS NOTES
"Pulmonary Medicine Follow-up     Hospital:  LOS: 1 day   Mr. Rhett Isaacs, 72 y.o. male is followed for:     Bronchial pneumonia    Benign hypertrophy of prostate    Mixed hyperlipidemia    Essential hypertension, benign    Chest tube in place    Weight loss    Empyema lung (HCC)          Subjective   Interval History:  The chart has been reviewed.  The patient states that he is breathing comfortably.  I was able to flush the atrium clear of thick empyema fluid.  I also was able to withdraw approximately 60 mL of tan material through the thoracostomy tube without difficulty.  It appears to be relatively free-flowing though does remain thick.    The patient's relevant past medical, surgical and social history were reviewed and updated in Epic as appropriate.     Review of systems was completed and is negative except as detailed above.     Objective     Medications:  alteplase, 10 mg, Intrapleural, Once   And  dornase alpha (PULMOZYME) 5 mg in sterile water, 5 mg, Intrapleural, BID  atorvastatin, 20 mg, Oral, Daily  finasteride, 5 mg, Oral, Daily  Linezolid, 600 mg, Intravenous, Q12H  lisinopril, 40 mg, Oral, Q24H  metoprolol succinate XL, 100 mg, Oral, Daily  piperacillin-tazobactam, 4.5 g, Intravenous, Q8H  sodium chloride, 10 mL, Intravenous, Q12H        Vital Sign Min/Max for last 24 hours  Temp  Min: 97.7 °F (36.5 °C)  Max: 98.8 °F (37.1 °C)   BP  Min: 106/64  Max: 135/89   Pulse  Min: 84  Max: 102   Resp  Min: 18  Max: 20   SpO2  Min: 93 %  Max: 95 %   No data recorded       Input/Output for last 24 hour shift  12/08 0701 - 12/09 0700  In: 525 [P.O.:525]  Out: 1085 [Urine:1000; Drains:85]     Objective:  General Appearance:  Uncomfortable and in no acute distress.    Vital signs: (most recent): Blood pressure 106/64, pulse 84, temperature 98.4 °F (36.9 °C), temperature source Oral, resp. rate 18, height 175.3 cm (69.02\"), weight 64.9 kg (143 lb), SpO2 93 %.    HEENT: Normal HEENT exam.    Lungs:  (Coarse " breath sounds on the left.  No wheezes are heard.  Right side is clear.  Left thoracostomy tube with thick tan empyema fluid.)  Heart: Normal rate.  Regular rhythm.  S1 normal and S2 normal.  No murmur.   Chest: Symmetric chest wall expansion.   Abdomen: Abdomen is soft and non-distended.  Bowel sounds are normal.   There is no abdominal tenderness.     Extremities: Normal range of motion.    Pulses: Distal pulses are intact.    Neurological: Patient is alert and oriented to person, place and time.    Pupils:  Pupils are equal, round, and reactive to light.  Pupils are equal.             Results from last 7 days   Lab Units 12/09/22 0523 12/08/22 0722   WBC 10*3/mm3 9.01 9.52   HEMOGLOBIN g/dL 8.4* 9.4*   PLATELETS 10*3/mm3 367 390     Results from last 7 days   Lab Units 12/09/22 0523 12/08/22 0722   SODIUM mmol/L 128* 130*   POTASSIUM mmol/L 4.4 4.0   CO2 mmol/L 27.0 25.0   BUN mg/dL 7* 10   CREATININE mg/dL 0.74* 0.55*   GLUCOSE mg/dL 123* 131*     Estimated Creatinine Clearance: 81.6 mL/min (A) (by C-G formula based on SCr of 0.74 mg/dL (L)).             I reviewed the patient's results and images.     Assessment & Plan   Impression & Plan       Bronchial pneumonia    Benign hypertrophy of prostate    Mixed hyperlipidemia    Essential hypertension, benign    Chest tube in place    Weight loss    Empyema lung (HCC)       I was able to instill alteplase and Pulmozyme into the pleural space and clamped the tube at 1318.  This will remain in place for the next 2 hours and then we will release his thrombolytic.  I explained the process to the patient and his wife and I also discussed the case with nursing staff.  Hopefully we can avoid any clogs in the system so that we can further clean out the space effectively.  He may need a repeat of this tomorrow Deion and sure that the viscosity is broken down.    Continuing with current antimicrobial therapy.  I note that there are some organisms on stain but cultures  remain negative.      I discussed the patient's findings and my recommendations with patient, family and nursing staff       Jeremias Hughes MD, [facility]  Pulmonary and Critical Care Medicine  12/09/22 13:36 EST

## 2022-12-09 NOTE — PLAN OF CARE
Goal Outcome Evaluation:  Plan of Care Reviewed With: patient, spouse           Outcome Evaluation: Patient presents with mild deficits in balance and endurance. Today he ambulated in room 20'+20' with FWW and supervision. He will benefit from IPPT to address deficits and enhance functional mobility. Will recommend that he D/C home with assist.

## 2022-12-09 NOTE — PLAN OF CARE
Goal Outcome Evaluation:  Plan of Care Reviewed With: patient, spouse           Outcome Evaluation: OT eval completed. Pt presents with generalized weakness and decreased activity tolerance impacting ADL's. Pt completed bed mobility with Kamar, ambulated with SUP/FWW to bathroom, CGA for toilet transfer and toileting tasks performed in standing. OT to follow to support return to PLOF. Recommend home with assist at discharge. Pt would benefit from TTB for showers.

## 2022-12-09 NOTE — PLAN OF CARE
Goal Outcome Evaluation:  Plan of Care Reviewed With: patient           Outcome Evaluation: Patient remains on room air, Pigtail chest tube to LWS drained 30cc of thick purulent drainage. No complaints of pain, VSS, NSR. Will continue with plan of care.

## 2022-12-09 NOTE — THERAPY EVALUATION
Patient Name: Rhett Isaacs  : 1950    MRN: [id number]                              Today's Date: 2022       Admit Date: 2022    Visit Dx: No diagnosis found.  Patient Active Problem List   Diagnosis   • Benign hypertrophy of prostate   • Mixed hyperlipidemia   • Essential hypertension, benign   • Lung mass   • Bronchial pneumonia   • Chest tube in place   • Weight loss   • Empyema lung (HCC)     Past Medical History:   Diagnosis Date   • Acute bronchitis    • Acute prostatitis    • Acute serous otitis media    • Benign prostatic hyperplasia    • Cancer (HCC) 2015    Basil cell carcinoma   • Cataract     still have them- bilat   • Colon polyp 10/2017   • Diabetes mellitus ([facility]) 1967    doesnt checks sugar   • Diverticulosis 2004    Found in first colonoscopy   • Dysuria    • Fatigue    • GERD (gastroesophageal reflux disease)    • Hyperlipidemia    • Hypertension    • Impacted cerumen    • Prostatitis    • PVC (premature ventricular contraction)    • Wears glasses     readers     Past Surgical History:   Procedure Laterality Date   • ADENOIDECTOMY     • BASAL CELL CARCINOMA EXCISION     • BRONCHOSCOPY N/A 2022    Procedure: BRONCHOSCOPY WITH ENDOBRONCHIAL ULTRASOUND WITH FLUOROSCOPY;  Surgeon: Jeremias Hughes MD;  Location: [facility] ENDOSCOPY;  Service: Pulmonary;  Laterality: N/A;  EBUS balloon removed and intact   • COLONOSCOPY  10/23/2017   • EYE SURGERY  2015    Basil cell carcenoma   • TONSILLECTOMY     • WISDOM TOOTH EXTRACTION        General Information     Row Name 22 1012          OT Time and Intention    Document Type evaluation  -LEANDRO     Mode of Treatment occupational therapy  -LEANDRO     Row Name 22 1012          General Information    Patient Profile Reviewed yes  -LEANDRO     Prior Level of Function independent:;ADL's;transfer;all household mobility;community mobility;home management;driving;using stairs  Ambulates using FWW PRN  -LEANDRO      Existing Precautions/Restrictions fall;other (see comments)  L chest tube to suction  -LEANDRO     Barriers to Rehab none identified  -LEANDRO     Row Name 12/09/22 1012          Occupational Profile    Environmental Supports and Barriers (Occupational Profile) Lives with spouse in ranch-style home, typically sleeps on sofa, has tub shower, reports that he uses FWW as needed.  -LEANDRO     Row Name 12/09/22 1012          Living Environment    People in Home spouse  -LEANDRO     Row Name 12/09/22 1012          Home Main Entrance    Number of Stairs, Main Entrance one  -LEANDRO     Stair Railings, Main Entrance railings on both sides of stairs  -LEANDRO     Row Name 12/09/22 1012          Stairs Within Home, Primary    Number of Stairs, Within Home, Primary none  -LEANDRO     Row Name 12/09/22 1012          Cognition    Orientation Status (Cognition) oriented x 4  -LEANDRO     Row Name 12/09/22 1012          Safety Issues, Functional Mobility    Safety Issues Affecting Function (Mobility) insight into deficits/self-awareness;safety precaution awareness  -LEANDRO     Impairments Affecting Function (Mobility) balance;endurance/activity tolerance;strength  -           User Key  (r) = Recorded By, (t) = Taken By, (c) = Cosigned By    Initials Name Provider Type    LEANDRO Ness Dexter OT Occupational Therapist                 Mobility/ADL's     Row Name 12/09/22 1015          Bed Mobility    Bed Mobility supine-sit  -LEANDRO     Supine-Sit [place] (Bed Mobility) minimum assist (75% patient effort);verbal cues  -     Bed Mobility, Safety Issues impaired trunk control for bed mobility  -     Assistive Device (Bed Mobility) bed rails;head of bed elevated  -LEANDRO     Comment, (Bed Mobility) assist for trunk control, cues for line management  -LEANDRO     Row Name 12/09/22 1015          Transfers    Transfers sit-stand transfer;toilet transfer  -LEANDRO     Row Name 12/09/22 1015          Sit-Stand Transfer    Sit-Stand [place] (Transfers) contact guard  -     Assistive  Device (Sit-Stand Transfers) walker, front-wheeled  -     Comment, (Sit-Stand Transfer) cues for HP and walker management during transition to commode  -     Row Name 12/09/22 1015          Toilet Transfer    Type (Toilet Transfer) stand-sit;stand pivot/stand step  -     Archer Level (Toilet Transfer) supervision  -     Assistive Device (Toilet Transfer) commode;grab bars/safety frame  -     Row Name 12/09/22 1015          Functional Mobility    Functional Mobility- Ind. Level supervision required  -     Functional Mobility- Device walker, front-wheeled  -LEANDRO     Functional Mobility-Distance (Feet) 20  -LEANDRO     Functional Mobility- Safety Issues step length decreased  -     Functional Mobility- Comment Pt ambulated to bathroom using FWW with Close SUP, assist to manage lines  -     Row Name 12/09/22 1015          Activities of Daily Living    BADL Assessment/Intervention grooming;toileting  -     Row Name 12/09/22 1015          Toileting Assessment/Training    Archer Level (Toileting) adjust/manage clothing;perform perineal hygiene;change pad/brief;contact guard assist  -     Assistive Devices (Toileting) commode;grab bar/safety frame  -     Position (Toileting) unsupported sitting;unsupported standing  -     Comment, (Toileting) CGA for balance during clothing management in standing  -           User Key  (r) = Recorded By, (t) = Taken By, (c) = Cosigned By    Initials Name Provider Type    Ness Mcbride OT Occupational Therapist               Obj/Interventions     Row Name 12/09/22 1019          Sensory Assessment (Somatosensory)    Sensory Assessment (Somatosensory) UE sensation intact  -     Row Name 12/09/22 1019          Vision Assessment/Intervention    Visual Impairment/Limitations corrective lenses for distance  -     Row Name 12/09/22 1019          Range of Motion Comprehensive    General Range of Motion bilateral upper extremity ROM WFL  -     Row Name  12/09/22 1019          Strength Comprehensive (MMT)    Comment, General Manual Muscle Testing (MMT) Assessment ROYAL's grossly 4/5  -LEANDRO     Row Name 12/09/22 1019          Balance    Balance Assessment sitting static balance;sitting dynamic balance;standing static balance;standing dynamic balance  -LEANDRO     Static Sitting Balance independent  -LEANDRO     Dynamic Sitting Balance standby assist  -LEANDRO     Position, Sitting Balance unsupported;sitting edge of bed  -LEANDRO     Static Standing Balance standby assist  -LEANDRO     Dynamic Standing Balance supervision  -LEANDRO     Position/Device Used, Standing Balance supported;walker, front-wheeled  -LEANDRO     Balance Interventions standing;occupation based/functional task  -LEANDRO     Comment, Balance SBA for grooming tasks performed standing at sink.  -LEANDRO           User Key  (r) = Recorded By, (t) = Taken By, (c) = Cosigned By    Initials Name Provider Type    Ness Mcbride OT Occupational Therapist               Goals/Plan     Row Name 12/09/22 1026          Transfer Goal 1 (OT)    Activity/Assistive Device (Transfer Goal 1, OT) sit-to-stand/stand-to-sit;toilet;walker, rolling  -LEANDRO     [place] Level/Cues Needed (Transfer Goal 1, OT) standby assist  -LEANDRO     Time Frame (Transfer Goal 1, OT) long term goal (LTG);by discharge  -LEANDRO     Progress/Outcome (Transfer Goal 1, OT) new goal  -LEANDRO     Row Name 12/09/22 1026          Toileting Goal 1 (OT)    Activity/Device (Toileting Goal 1, OT) adjust/manage clothing;perform perineal hygiene;commode;grab bar/safety frame  -LEANDRO     [place] Level/Cues Needed (Toileting Goal 1, OT) standby assist  -LEANDRO     Time Frame (Toileting Goal 1, OT) long term goal (LTG);by discharge  -LEANDRO     Progress/Outcome (Toileting Goal 1, OT) new goal  -LEANDRO     Row Name 12/09/22 1026          Grooming Goal 1 (OT)    Activity/Device (Grooming Goal 1, OT) oral care;wash face, hands;hair care  -LEANDRO     [place] (Grooming Goal 1, OT) standby assist  -LEANDRO     Time Frame  (Grooming Goal 1, OT) long term goal (LTG);by discharge  -LEANDRO     Strategies/Barriers (Grooming Goal 1, OT) standing sink side  -LEANDRO     Progress/Outcome (Grooming Goal 1, OT) new goal  -LEANDRO     Row Name 12/09/22 1026          Therapy Assessment/Plan (OT)    Planned Therapy Interventions (OT) activity tolerance training;BADL retraining;functional balance retraining;occupation/activity based interventions;patient/caregiver education/training;ROM/therapeutic exercise;strengthening exercise;transfer/mobility retraining  -LEANDRO           User Key  (r) = Recorded By, (t) = Taken By, (c) = Cosigned By    Initials Name Provider Type    Ness Mcbride, OT Occupational Therapist               Clinical Impression     Row Name 12/09/22 1021          Pain Assessment    Pretreatment Pain Rating 0/10 - no pain  -LEANDRO     Posttreatment Pain Rating 0/10 - no pain  -LEANDRO     Row Name 12/09/22 1021          Plan of Care Review    Plan of Care Reviewed With patient;spouse  -LEANDRO     Outcome Evaluation OT eval completed. Pt presents with generalized weakness and decreased activity tolerance impacting ADL's. Pt completed bed mobility with Kamar, ambulated with SUP/FWW to bathroom, CGA for toilet transfer and toileting tasks performed in standing. OT to follow to support return to PLOF. Recommend home with assist at discharge. Pt would benefit from TTB for showers.  -LEANDRO     Row Name 12/09/22 1021          Therapy Assessment/Plan (OT)    Rehab Potential (OT) good, to achieve stated therapy goals  -LEANDRO     Criteria for Skilled Therapeutic Interventions Met (OT) yes;meets criteria;skilled treatment is necessary  -LEANDRO     Therapy Frequency (OT) daily  -LEANDRO     Row Name 12/09/22 1021          Therapy Plan Review/Discharge Plan (OT)    Equipment Needs Upon Discharge (OT) tub bench  -LEANDRO     Anticipated Discharge Disposition (OT) home with assist  -LEANDRO     Row Name 12/09/22 1021          Vital Signs    Pre Systolic BP Rehab 120  -LEANDRO     Pre Treatment Diastolic  BP 72  -LEANDRO     Post Systolic BP Rehab 123  -LEANDRO     Post Treatment Diastolic BP 70  -LEANDRO     Pretreatment Heart Rate (beats/min) 99  -LEANDRO     Pre SpO2 (%) 95  -LEANDRO     O2 Delivery Pre Treatment room air  -LEANDRO     O2 Delivery Intra Treatment room air  -LEANDRO     Post SpO2 (%) 95  -LEANDRO     O2 Delivery Post Treatment room air  -LEANDRO     Pre Patient Position Supine  -LEADNRO     Intra Patient Position Standing  -LEANDRO     Post Patient Position Sitting  -LEANDRO     Row Name 12/09/22 1021          Positioning and Restraints    Pre-Treatment Position in bed  -LEANDRO     Post Treatment Position chair  -LEANDRO     In Chair notified nsg;reclined;call light within reach;encouraged to call for assist;with family/caregiver;waffle cushion;legs elevated  -LEANDRO           User Key  (r) = Recorded By, (t) = Taken By, (c) = Cosigned By    Initials Name Provider Type    Ness Mcbride OT Occupational Therapist               Outcome Measures     Row Name 12/09/22 1028          How much help from another is currently needed...    Putting on and taking off regular lower body clothing? 3  -LEANDRO     Bathing (including washing, rinsing, and drying) 3  -LEANDRO     Toileting (which includes using toilet bed pan or urinal) 3  -LEANDRO     Putting on and taking off regular upper body clothing 3  -LEANDRO     Taking care of personal grooming (such as brushing teeth) 4  -LEANDRO     Eating meals 4  -LEANDRO     AM-PAC 6 Clicks Score (OT) 20  -LEANDRO     Row Name 12/09/22 0943          How much help from another person do you currently need...    Turning from your back to your side while in flat bed without using bedrails? 4  -CM     Moving from lying on back to sitting on the side of a flat bed without bedrails? 3  -CM     Moving to and from a bed to a chair (including a wheelchair)? 4  -CM     Standing up from a chair using your arms (e.g., wheelchair, bedside chair)? 3  -CM     Climbing 3-5 steps with a railing? 3  -CM     To walk in hospital room? 4  -CM     AM-PAC 6 Clicks Score (PT) 21  -CM      Highest level of mobility 6 --> Walked 10 steps or more  -CM     Row Name 12/09/22 1028 12/09/22 0943       Functional Assessment    Outcome Measure Options AM-PAC 6 Clicks Daily Activity (OT)  -LEANDRO AM-PAC 6 Clicks Basic Mobility (PT)  -CM          User Key  (r) = Recorded By, (t) = Taken By, (c) = Cosigned By    Initials Name Provider Type    Ness Mcbride, OT Occupational Therapist    CM Lisandra Allen, PT Physical Therapist                Occupational Therapy Education     Title: PT OT SLP Therapies (In Progress)     Topic: Occupational Therapy (In Progress)     Point: ADL training (Done)     Description:   Instruct learner(s) on proper safety adaptation and remediation techniques during self care or transfers.   Instruct in proper use of assistive devices.              Learning Progress Summary           Patient Acceptance, E, VU by LEANDRO at 12/9/2022 1029    Comment: OT POC; fall prevention; DME assessment; discharge planning   Family Acceptance, E, VU by LEANDRO at 12/9/2022 1029    Comment: OT POC; fall prevention; DME assessment; discharge planning                   Point: Home exercise program (Not Started)     Description:   Instruct learner(s) on appropriate technique for monitoring, assisting and/or progressing therapeutic exercises/activities.              Learner Progress:  Not documented in this visit.          Point: Precautions (Done)     Description:   Instruct learner(s) on prescribed precautions during self-care and functional transfers.              Learning Progress Summary           Patient Acceptance, E, VU by LEANDRO at 12/9/2022 1029    Comment: OT POC; fall prevention; DME assessment; discharge planning   Family Acceptance, E, VU by LEANDRO at 12/9/2022 1029    Comment: OT POC; fall prevention; DME assessment; discharge planning                   Point: Body mechanics (Done)     Description:   Instruct learner(s) on proper positioning and spine alignment during self-care, functional mobility  activities and/or exercises.              Learning Progress Summary           Patient Acceptance, E, VU by LEANDRO at 12/9/2022 1029    Comment: OT POC; fall prevention; DME assessment; discharge planning   Family Acceptance, E, VU by LEANDRO at 12/9/2022 1029    Comment: OT POC; fall prevention; DME assessment; discharge planning                               User Key     Initials Effective Dates Name Provider Type Discipline     06/16/21 -  Ness Dexter OT Occupational Therapist OT              OT Recommendation and Plan  Planned Therapy Interventions (OT): activity tolerance training, BADL retraining, functional balance retraining, occupation/activity based interventions, patient/caregiver education/training, ROM/therapeutic exercise, strengthening exercise, transfer/mobility retraining  Therapy Frequency (OT): daily  Plan of Care Review  Plan of Care Reviewed With: patient, spouse  Outcome Evaluation: OT eval completed. Pt presents with generalized weakness and decreased activity tolerance impacting ADL's. Pt completed bed mobility with Kamar, ambulated with SUP/FWW to bathroom, CGA for toilet transfer and toileting tasks performed in standing. OT to follow to support return to PLOF. Recommend home with assist at discharge. Pt would benefit from TTB for showers.     Time Calculation:    Time Calculation- OT     Row Name 12/09/22 0830             Time Calculation- OT    OT Start Time 0830  -LEANDRO      OT Received On 12/09/22  -LEANDRO      OT Goal Re-Cert Due Date 12/19/22  -LEANDRO         Untimed Charges    OT Eval/Re-eval Minutes 48  -LEANDRO         Total Minutes    Untimed Charges Total Minutes 48  -LEANDRO       Total Minutes 48  -LEANDRO            User Key  (r) = Recorded By, (t) = Taken By, (c) = Cosigned By    Initials Name Provider Type    Ness Mcbride OT Occupational Therapist              Therapy Charges for Today     Code Description Service Date Service Provider Modifiers Qty    97341800481 HC OT EVAL LOW COMPLEXITY 4 12/9/2022  Ness Dexter, OT GO 1               Ness Dexter, OT  12/9/2022

## 2022-12-10 ENCOUNTER — APPOINTMENT (OUTPATIENT)
Dept: GENERAL RADIOLOGY | Facility: HOSPITAL | Age: 72
End: 2022-12-10

## 2022-12-10 PROBLEM — E43 SEVERE MALNUTRITION (HCC): Status: ACTIVE | Noted: 2022-12-10

## 2022-12-10 LAB
ANION GAP SERPL CALCULATED.3IONS-SCNC: 8 MMOL/L (ref 5–15)
BASOPHILS # BLD AUTO: 0.02 10*3/MM3 (ref 0–0.2)
BASOPHILS NFR BLD AUTO: 0.2 % (ref 0–1.5)
BUN SERPL-MCNC: 8 MG/DL (ref 8–23)
BUN/CREAT SERPL: 13.1 (ref 7–25)
CALCIUM SPEC-SCNC: 9.2 MG/DL (ref 8.6–10.5)
CHLORIDE SERPL-SCNC: 94 MMOL/L (ref 98–107)
CO2 SERPL-SCNC: 29 MMOL/L (ref 22–29)
CREAT SERPL-MCNC: 0.61 MG/DL (ref 0.76–1.27)
DEPRECATED RDW RBC AUTO: 49.2 FL (ref 37–54)
EGFRCR SERPLBLD CKD-EPI 2021: 101.4 ML/MIN/1.73
EOSINOPHIL # BLD AUTO: 0.14 10*3/MM3 (ref 0–0.4)
EOSINOPHIL NFR BLD AUTO: 1.7 % (ref 0.3–6.2)
ERYTHROCYTE [DISTWIDTH] IN BLOOD BY AUTOMATED COUNT: 17.1 % (ref 12.3–15.4)
GLUCOSE SERPL-MCNC: 137 MG/DL (ref 65–99)
HCT VFR BLD AUTO: 27.5 % (ref 37.5–51)
HGB BLD-MCNC: 8.6 G/DL (ref 13–17.7)
IMM GRANULOCYTES # BLD AUTO: 0.2 10*3/MM3 (ref 0–0.05)
IMM GRANULOCYTES NFR BLD AUTO: 2.4 % (ref 0–0.5)
LYMPHOCYTES # BLD AUTO: 2.22 10*3/MM3 (ref 0.7–3.1)
LYMPHOCYTES NFR BLD AUTO: 26.6 % (ref 19.6–45.3)
MCH RBC QN AUTO: 24.8 PG (ref 26.6–33)
MCHC RBC AUTO-ENTMCNC: 31.3 G/DL (ref 31.5–35.7)
MCV RBC AUTO: 79.3 FL (ref 79–97)
MONOCYTES # BLD AUTO: 1.03 10*3/MM3 (ref 0.1–0.9)
MONOCYTES NFR BLD AUTO: 12.3 % (ref 5–12)
NEUTROPHILS NFR BLD AUTO: 4.75 10*3/MM3 (ref 1.7–7)
NEUTROPHILS NFR BLD AUTO: 56.8 % (ref 42.7–76)
NRBC BLD AUTO-RTO: 0 /100 WBC (ref 0–0.2)
PLATELET # BLD AUTO: 386 10*3/MM3 (ref 140–450)
PMV BLD AUTO: 9 FL (ref 6–12)
POTASSIUM SERPL-SCNC: 3.9 MMOL/L (ref 3.5–5.2)
RBC # BLD AUTO: 3.47 10*6/MM3 (ref 4.14–5.8)
SODIUM SERPL-SCNC: 131 MMOL/L (ref 136–145)
WBC NRBC COR # BLD: 8.36 10*3/MM3 (ref 3.4–10.8)

## 2022-12-10 PROCEDURE — 25010000002 CEFTRIAXONE PER 250 MG: Performed by: INTERNAL MEDICINE

## 2022-12-10 PROCEDURE — 80048 BASIC METABOLIC PNL TOTAL CA: CPT | Performed by: INTERNAL MEDICINE

## 2022-12-10 PROCEDURE — 71045 X-RAY EXAM CHEST 1 VIEW: CPT

## 2022-12-10 PROCEDURE — 25010000002 ALTEPLASE 2 MG RECONSTITUTED SOLUTION: Performed by: INTERNAL MEDICINE

## 2022-12-10 PROCEDURE — 99232 SBSQ HOSP IP/OBS MODERATE 35: CPT | Performed by: INTERNAL MEDICINE

## 2022-12-10 PROCEDURE — 85025 COMPLETE CBC W/AUTO DIFF WBC: CPT | Performed by: INTERNAL MEDICINE

## 2022-12-10 RX ADMIN — FINASTERIDE 5 MG: 5 TABLET, FILM COATED ORAL at 08:22

## 2022-12-10 RX ADMIN — LISINOPRIL 40 MG: 40 TABLET ORAL at 08:22

## 2022-12-10 RX ADMIN — TRAMADOL HYDROCHLORIDE 50 MG: 50 TABLET, COATED ORAL at 20:58

## 2022-12-10 RX ADMIN — ATORVASTATIN CALCIUM 20 MG: 20 TABLET, FILM COATED ORAL at 08:22

## 2022-12-10 RX ADMIN — DORNASE ALFA 5 MG: 1 SOLUTION RESPIRATORY (INHALATION) at 22:09

## 2022-12-10 RX ADMIN — DORNASE ALFA 5 MG: 1 SOLUTION RESPIRATORY (INHALATION) at 11:07

## 2022-12-10 RX ADMIN — METRONIDAZOLE 500 MG: 500 TABLET ORAL at 13:50

## 2022-12-10 RX ADMIN — Medication 10 ML: at 08:22

## 2022-12-10 RX ADMIN — ALTEPLASE 10 MG: 2.2 INJECTION, POWDER, LYOPHILIZED, FOR SOLUTION INTRAVENOUS at 22:09

## 2022-12-10 RX ADMIN — TRAMADOL HYDROCHLORIDE 50 MG: 50 TABLET, COATED ORAL at 10:02

## 2022-12-10 RX ADMIN — ALTEPLASE 10 MG: 2.2 INJECTION, POWDER, LYOPHILIZED, FOR SOLUTION INTRAVENOUS at 11:07

## 2022-12-10 RX ADMIN — CEFTRIAXONE 2 G: 2 INJECTION, POWDER, FOR SOLUTION INTRAMUSCULAR; INTRAVENOUS at 20:48

## 2022-12-10 RX ADMIN — METOPROLOL SUCCINATE 100 MG: 100 TABLET, EXTENDED RELEASE ORAL at 08:21

## 2022-12-10 RX ADMIN — METRONIDAZOLE 500 MG: 500 TABLET ORAL at 05:22

## 2022-12-10 RX ADMIN — METRONIDAZOLE 500 MG: 500 TABLET ORAL at 20:48

## 2022-12-10 NOTE — PLAN OF CARE
Goal Outcome Evaluation:  Plan of Care Reviewed With: patient           Outcome Evaluation: Patient remains on room air. He did have another Pulmozyme intrapleural treatment #2 of 6 instilled by Tobias ONTIVEROS. Chest tube drainage 50cc total this shift. VSS, NSR, No comlaints of pain. Will continue with plan of care.

## 2022-12-10 NOTE — PROGRESS NOTES
INFECTIOUS DISEASE Progress Note    Rhett Isaacs  1950  [id number]      Admission Date: 12/8/2022      Requesting Provider: Jeremias Hughes MD  Evaluating Physician: George Underwood MD    Reason for Consultation: empyema    History of present illness:    12/9/22: Patient is a 72 y.o. male with h/o BPH, HTN, and HLD who was admitted to [facility] ED from interventional radiology after chest tube placement.  The patient had an aspiration event after vomiting in his sleep in September 2022.  He was on 2 round of antibiotics and improved.  A CT scan on 10/18 showed a 6.3 cm mass along the left anterior pleural margin of DIEGO with loculated pleural effusion.  He was referred to pulmonology and underwent a bronchoscopy by Dr. Hughes on 11/9.  The pathology showed lipoid pneumonia.  All cultures including AFB were negative.  He underwent a CT-guided aspiration of left lung mass on 12/8 which showed that it was an abscess.  He also had a chest tube placed in the left empyema.  Pleural fluid had 89% neutrophils with cell count pending.  Cultures are negative to date and pending.  He is afebrile.  Pertinent labs were CRP 10.34, ESR 87, Ferritin 869, WBC 9500 with 67% neutrophils, and creatinine 0.55. A CXR showed a new small bore right-sided chest tube in left posterior pleural space with decreased left-sided airspace disease. He is currently on Zosyn and Linezolid.  ID was asked to evaluate and manage his antibiotic therapy. The anterior fluid collection was drained with aspiration by interventional radiology and a chest tube was placed in the posterior collection.  He is now draining thick purulent material from the drainage catheter.  Pulmonary medicine has placed alteplase and Pulmozyme to assist with drainage. Gram stain of his pleural fluid reveals moderate white blood cells with moderate gram-positive cocci in pairs and clusters.  His nasal MRSA PCR is negative.    On examination, the patient was very  uncomfortable as Alteplase and Dornase alpha was injected into the pleural cavity and drain clamped.  His wife indicates that he has progressively worsened over the last several weeks with anorexia, weight loss, malaise, and fatigue.  He has lost over 30 pounds.      12/10/22: He has remained afebrile over the last 24 hours. His white blood cell count today is 8.4. He continues to complain of malaise and fatigue.  He has anorexia.  He complains of a metallic taste from the metronidazole. His empyema cultures are growing gram-positive cocci.    Past Medical History:   Diagnosis Date   • Acute bronchitis    • Acute prostatitis    • Acute serous otitis media    • Benign prostatic hyperplasia    • Cancer ([facility]) 09/2015    Basil cell carcinoma   • Cataract     still have them- bilat   • Colon polyp 10/2017   • Diabetes mellitus ([facility]) 06/1967    doesnt checks sugar   • Diverticulosis 2004    Found in first colonoscopy   • Dysuria    • Fatigue    • GERD (gastroesophageal reflux disease) 2016   • Hyperlipidemia    • Hypertension    • Impacted cerumen    • Prostatitis    • PVC (premature ventricular contraction)    • Wears glasses     readers       Past Surgical History:   Procedure Laterality Date   • ADENOIDECTOMY  1956   • BASAL CELL CARCINOMA EXCISION  2015   • BRONCHOSCOPY N/A 11/9/2022    Procedure: BRONCHOSCOPY WITH ENDOBRONCHIAL ULTRASOUND WITH FLUOROSCOPY;  Surgeon: Jeremias Hughes MD;  Location: [facility] ENDOSCOPY;  Service: Pulmonary;  Laterality: N/A;  EBUS balloon removed and intact   • COLONOSCOPY  10/23/2017   • EYE SURGERY  September 2015    Basil cell carcenoma   • TONSILLECTOMY     • WISDOM TOOTH EXTRACTION         Family History   Problem Relation Age of Onset   • Alcohol abuse Father    • Hyperlipidemia Mother    • Hypertension Brother        Social History     Socioeconomic History   • Marital status:    Tobacco Use   • Smoking status: Never   • Smokeless tobacco: Never   Vaping Use   • Vaping  Use: Never used   Substance and Sexual Activity   • Alcohol use: No   • Drug use: No   • Sexual activity: Never       Allergies   Allergen Reactions   • Penicillins Diarrhea   • Cardizem [Diltiazem Hcl] Rash   • Sulfa Antibiotics Rash         Medication:    Current Facility-Administered Medications:   •  acetaminophen (TYLENOL) tablet 650 mg, 650 mg, Oral, Q4H PRN, 650 mg at 12/09/22 1353 **OR** acetaminophen (TYLENOL) 160 MG/5ML solution 650 mg, 650 mg, Oral, Q4H PRN **OR** acetaminophen (TYLENOL) suppository 650 mg, 650 mg, Rectal, Q4H PRN, Kaleb Chapman DO  •  atorvastatin (LIPITOR) tablet 20 mg, 20 mg, Oral, Daily, Kaleb Chapman DO, 20 mg at 12/09/22 0811  •  cefTRIAXone (ROCEPHIN) 2 g/100 mL 0.9% NS IVPB (MBP), 2 g, Intravenous, Q24H, George Underwood MD, 2 g at 12/09/22 2118  •  [COMPLETED] alteplase (ACTIVASE) 10 mg in 0.9% NaCl 30 mL syringe, 10 mg, Intrapleural, Once, 10 mg at 12/09/22 1320 **AND** dornase alpha (PULMOZYME) 5 mg in sterile water (preservative free) 30 mL, 5 mg, Intrapleural, BID, Jeremias Hughes MD, 5 mg at 12/09/22 2351  •  finasteride (PROSCAR) tablet 5 mg, 5 mg, Oral, Daily, Kaleb Chapman DO, 5 mg at 12/09/22 0810  •  lisinopril (PRINIVIL,ZESTRIL) tablet 40 mg, 40 mg, Oral, Q24H, Kaleb Chapman DO, 40 mg at 12/09/22 0811  •  metoprolol succinate XL (TOPROL-XL) 24 hr tablet 100 mg, 100 mg, Oral, Daily, Kaleb Chapman DO, 100 mg at 12/09/22 0810  •  metroNIDAZOLE (FLAGYL) tablet 500 mg, 500 mg, Oral, Q8H, George Underwood MD, 500 mg at 12/10/22 0522  •  ondansetron (ZOFRAN) tablet 4 mg, 4 mg, Oral, Q6H PRN **OR** ondansetron (ZOFRAN) injection 4 mg, 4 mg, Intravenous, Q6H PRN, Kaleb Chapman,   •  sodium chloride 0.9 % flush 10 mL, 10 mL, Intravenous, Q12H, Kaleb Chapman DO, 10 mL at 12/09/22 0811  •  sodium chloride 0.9 % flush 10 mL, 10 mL, Intravenous, PRN, Kaleb Chapman DO  •  sodium chloride 0.9 % infusion 40 mL,  40 mL, Intravenous, PRN, Kaleb Chapman DO  •  traMADol (ULTRAM) tablet 50 mg, 50 mg, Oral, Q6H PRN, Kaleb Chapman DO, 50 mg at 22 2339    Antibiotics:  Anti-Infectives (From admission, onward)    Ordered     Dose/Rate Route Frequency Start Stop    22 1917  metroNIDAZOLE (FLAGYL) tablet 500 mg        Ordering Provider: George Underwood MD    500 mg Oral Every 8 Hours Scheduled 22 2200 22 2159    22 191  cefTRIAXone (ROCEPHIN) 2 g/100 mL 0.9% NS IVPB (MBP)        Ordering Provider: George Underwood MD    2 g  over 30 Minutes Intravenous Every 24 Hours 22 2100 22 1335  piperacillin-tazobactam (ZOSYN) 4.5 g in iso-osmotic dextrose 100 mL IVPB (premix)        Ordering Provider: Jeremias Hughes MD    4.5 g  over 30 Minutes Intravenous Once 22 1430 22 1606            Review of Systems:  See HPI    Physical Exam:   Vital Signs  Temp (24hrs), Av °F (36.7 °C), Min:96.9 °F (36.1 °C), Max:98.6 °F (37 °C)    Temp  Min: 96.9 °F (36.1 °C)  Max: 98.6 °F (37 °C)  BP  Min: 106/67  Max: 124/77  Pulse  Min: 82  Max: 102  Resp  Min: 16  Max: 18  SpO2  Min: 92 %  Max: 96 %    GENERAL: Awake and alert, in moderate distress. Frail appearing.   HEENT: Normocephalic, atraumatic.  PERRL. EOMI. No conjunctival injection. No icterus. Oropharynx clear without evidence of thrush or exudate.  NECK: Supple  HEART: RRR; No murmur, rubs, gallops.   LUNGS: diminished in left lung field, scattered rales in right lung field without wheezing. Normal respiratory effort. Nonlabored. Chest tube in place in the left posterior chest. Copious thick purulent material has been drained.  ABDOMEN: Soft, nontender, nondistended.  No rebound or guarding. NO mass or HSM.  EXT:  No cyanosis, clubbing or edema. No cord.  :  Without Rivera catheter.  MSK: No joint effusions or erythema  SKIN: Warm and dry without cutaneous eruptions on Inspection/palpation.    NEURO:  Oriented to PPT.  Motor 5/5 strength  PSYCHIATRIC: Normal insight and judgment. Cooperative with PE    Laboratory Data    Results from last 7 days   Lab Units 12/10/22  0641 12/09/22 0523 12/08/22 0722   WBC 10*3/mm3 8.36 9.01 9.52   HEMOGLOBIN g/dL 8.6* 8.4* 9.4*   HEMATOCRIT % 27.5* 27.3* 29.7*   PLATELETS 10*3/mm3 386 367 390     Results from last 7 days   Lab Units 12/09/22 0523   SODIUM mmol/L 128*   POTASSIUM mmol/L 4.4   CHLORIDE mmol/L 94*   CO2 mmol/L 27.0   BUN mg/dL 7*   CREATININE mg/dL 0.74*   GLUCOSE mg/dL 123*   CALCIUM mg/dL 9.1     Results from last 7 days   Lab Units 12/09/22 0523   ALK PHOS U/L 96   BILIRUBIN mg/dL 0.4   ALT (SGPT) U/L 23   AST (SGOT) U/L 17     Results from last 7 days   Lab Units 12/08/22 0722   SED RATE mm/hr 87*     Results from last 7 days   Lab Units 12/08/22 0722   CRP mg/dL 10.34*                 Estimated Creatinine Clearance: 81.6 mL/min (A) (by C-G formula based on SCr of 0.74 mg/dL (L)).      Microbiology:  Microbiology Results (last 10 days)     Procedure Component Value - Date/Time    MRSA Screen, PCR (Inpatient) - Swab, Nares [[id number]]  (Normal) Collected: 12/09/22 1209    Lab Status: Final result Specimen: Swab from Nares Updated: 12/09/22 1424     MRSA PCR Negative    Narrative:      The negative predictive value of this diagnostic test is high and should only be used to consider de-escalating anti-MRSA therapy. A positive result may indicate colonization with MRSA and must be correlated clinically.  MRSA Negative    Body Fluid Culture - Body Fluid, Lung, Left Lower Lobe [[id number]] Collected: 12/08/22 1143    Lab Status: Preliminary result Specimen: Body Fluid from Lung, Left Lower Lobe Updated: 12/09/22 0822     Body Fluid Culture No growth     Gram Stain Many (4+) WBCs seen      No organisms seen    Body Fluid Culture - Body Fluid, Pleural Cavity [[id number]] Collected: 12/08/22 1138    Lab Status: Preliminary result Specimen: Body Fluid from Pleural  Cavity Updated: 12/09/22 0821     Body Fluid Culture No growth     Gram Stain Moderate (3+) WBCs seen      Moderate (3+) Gram positive cocci in pairs and clusters                Radiology:  XR Chest 1 View    Result Date: 12/9/2022  1. New small bore right-sided chest tube likely within the posterior left pleural space. Decreased left-sided airspace disease compared to the prior exam.  This report was finalized on 12/9/2022 7:28 AM by Gio Barney MD.      CT Guided Chest Tube    Result Date: 12/8/2022  Impression:                                                              Successful CT guided aspiration of a left lung mass that turned out to be a lung abscess, likely related to the history of prior aspiration.  Successful CT-guided placement of an 8.5 Egyptian drainage catheter in the left empyema.  Thank you for the opportunity to assist in the care of your patient.  This report was finalized on 12/8/2022 11:58 AM by Ollie Segura MD.      CT Guided Abscess Drain Lung    Result Date: 12/8/2022  Impression:                                                              Successful CT guided aspiration of a left lung mass that turned out to be a lung abscess, likely related to the history of prior aspiration.  Successful CT-guided placement of an 8.5 Egyptian drainage catheter in the left empyema.  Thank you for the opportunity to assist in the care of your patient.  This report was finalized on 12/8/2022 11:58 AM by Ollie Segura MD.    I read his Chest x-ray of 12/10      Impression:   1. Left empyemas-he has 2 loculated areas of empyema in the left hemithorax with the largest area posteriorly. These are likely related to aspiration/aspiration pneumonia.  The Gram stain reveals gram-positive cocci and cultures are now growing gram-positive cocci. I will continue intravenous ceftriaxone and oral metronidazole for the time being.  2. Aspiration pneumonia/lipoid pneumonia  3. Anemia of chronic disease  4. Essential  hypertension  5. Benign prostatic hyperplasia  6. Penicillin (diarrhea) and sulfa (rash) allergies.     PLAN/RECOMMENDATIONS:   1. Anterior and posterior empyema cultures-pending  2. Ceftriaxone 2 g IV daily  3. Metronidazole 500 mg by mouth 3 times a day      George Underwood MD  12/10/2022  07:32 EST

## 2022-12-10 NOTE — PROGRESS NOTES
Intensive Care Follow-up     Hospital:  LOS: 2 days   Mr. Rhett Isaacs, 72 y.o. male is followed for:   Bronchial pneumonia            History of present illness:   72-year-old male with a past medical history significant for BPH, hypertension, and hyperlipidemia.  Who initially presented to [facility] underwent bronchoscopy on 11/9/2022 for a lingula pneumonia.  And also an associated small pleural effusion at the time.  He underwent bronchoscopy which showed no signs of malignancy.  Therefore CT-guided needle biopsy was recommended.  At the time of the CT-guided needle biopsy of the infiltrated had actually improved, but the pleural effusion had worsened.  IR had decided to sample the pleural fluid as well and pus was discovered.  Patient was admitted with an empyema and pulmonary is following for chest tube/empyema management.      Subjective   Interval History:  Patient doing well this morning.  On room air denies chest pain, nausea, fever, or chills.  Chest tube output 140 cc over last 24 hours.  Currently receiving tPA and dornase.             The patient's past medical, surgical and social history were reviewed and updated in Epic as appropriate.       Objective     Infusions:     Medications:  alteplase, 10 mg, Intrapleural, BID  atorvastatin, 20 mg, Oral, Daily  cefTRIAXone, 2 g, Intravenous, Q24H  dornase alpha (PULMOZYME) 5 mg in sterile water, 5 mg, Intrapleural, BID  finasteride, 5 mg, Oral, Daily  lisinopril, 40 mg, Oral, Q24H  metoprolol succinate XL, 100 mg, Oral, Daily  metroNIDAZOLE, 500 mg, Oral, Q8H  sodium chloride, 10 mL, Intravenous, Q12H      I reviewed the patient's medications.    Vital Sign Min/Max for last 24 hours  Temp  Min: 96.9 °F (36.1 °C)  Max: 98.6 °F (37 °C)   BP  Min: 106/67  Max: 124/77   Pulse  Min: 84  Max: 101   Resp  Min: 16  Max: 16   SpO2  Min: 92 %  Max: 97 %   Flow (L/min)  Min: 2  Max: 2       Input/Output for last 24 hour shift  12/09 0701 - 12/10  0700  In: 555 [P.O.:355]  Out: 2015 [Urine:1875; Drains:140]      GENERAL : NAD, conversant  RESPIRATORY/THORAX : normal respiratory effort and no intercostal retractions, CTAB  CARDIOVASCULAR : Normal S1/S2, RRR. no lower ext edema.  GASTROINTESTINAL : Soft, NT/ND. BS x 4 normoactive. No hepatosplenomegaly.  MUSCULOSKELETAL : No cyanosis, clubbing, or ischemia  NEUROLOGICAL: alert and oriented to person, place and time  PSYCHOLOGICAL : Appropriate affect      Results from last 7 days   Lab Units 12/10/22  0641 12/09/22  0523 12/08/22  0722   WBC 10*3/mm3 8.36 9.01 9.52   HEMOGLOBIN g/dL 8.6* 8.4* 9.4*   PLATELETS 10*3/mm3 386 367 390     Results from last 7 days   Lab Units 12/10/22  0641 12/09/22  0523 12/08/22  0722   SODIUM mmol/L 131* 128* 130*   POTASSIUM mmol/L 3.9 4.4 4.0   CO2 mmol/L 29.0 27.0 25.0   BUN mg/dL 8 7* 10   CREATININE mg/dL 0.61* 0.74* 0.55*   GLUCOSE mg/dL 137* 123* 131*     Estimated Creatinine Clearance: 99 mL/min (A) (by C-G formula based on SCr of 0.61 mg/dL (L)).          I reviewed the patient's new clinical results.  I reviewed the patient's new imaging results/reports including actual images and agree with reports.       Imaging Results (Last 24 Hours)     Procedure Component Value Units Date/Time    XR Chest 1 View [[id number]] Collected: 12/10/22 0849     Updated: 12/10/22 0856    Narrative:      DATE OF EXAM: 12/10/2022 5:12 AM     PROCEDURE: XR CHEST 1 VW-     INDICATIONS: Empyema     COMPARISON: 12/9/2022     TECHNIQUE: Single radiographic AP view of the chest was obtained.     FINDINGS:  Left pleural catheter is again seen. Mildly decreased loculated left  pleural effusion with improved aeration of the left lung base. Unchanged  cardiac silhouette. No pneumothorax. No acute osseous abnormalities.  Visualized upper abdomen is unremarkable.        Impression:      Mildly decreased loculated left pleural effusion with improved aeration  of the left lung base.     This report was  finalized on 12/10/2022 8:53 AM by Mauro Baron.             Assessment & Plan   Impression        Bronchial pneumonia    Benign hypertrophy of prostate    Mixed hyperlipidemia    Essential hypertension, benign    Chest tube in place    Weight loss    Empyema lung (HCC)    Severe malnutrition (HCC)       Plan        72-year-old male with a past medical history significant for BPH, hypertension, and hyperlipidemia.  Who initially presented to [facility] underwent bronchoscopy on 11/9/2022 for a lingula pneumonia.  And also an associated small pleural effusion at the time.  He underwent bronchoscopy which showed no signs of malignancy.  Therefore CT-guided needle biopsy was recommended.  At the time of the CT-guided needle biopsy of the infiltrated had actually improved, but the pleural effusion had worsened.  IR had decided to sample the pleural fluid as well and pus was discovered.  Patient was admitted with an empyema and pulmonary is following for chest tube/empyema management.    -Keep chest tube to suction  -tPA/dornase for 6 doses over 3 days  -Plan to repeat CT scan of the chest after completed to see if any need for possible VATS decortication  -Continue antibiotics with ceftriaxone, cultures currently with gram-positive cocci in pairs.  Ultimately defer antibiotic assistance to Dr. Underwood  -Mobilize patient  -Aggressive pulmonary toilet  -AM chest x-ray    Plan of care and goals reviewed with multidisciplinary/antibiotic stewardship team during rounds.   I discussed the patient's findings and my recommendations with patient and family       Marley Barksdale,   Pulmonary, Critical care and Sleep Medicine

## 2022-12-10 NOTE — PLAN OF CARE
Goal Outcome Evaluation:              Outcome Evaluation: VSS. NSR on monitor. remains on RA. Pt had dornase and alteplase intrapleural #3 today, tolerated well. total chest tube drainage for this shift 40ml. no complaints of pain or SOA. will cont. POC

## 2022-12-10 NOTE — PROGRESS NOTES
Dornase and Alteplase #3 was instilled in left chest tube and stopcock was closed.  Instructed patient and RN to turn every 15 minutes from left to right for 2 hours and then open stopcock on chest tube.      Electronically signed by EAN Monzon, 12/10/22, 11:11 AM EST.

## 2022-12-10 NOTE — PROGRESS NOTES
[facility] Medicine Services  PROGRESS NOTE    Patient Name: Rhett Isaacs  : 1950  MRN: [id number]    Date of Admission: 2022  Primary Care Physician: Elieser Delvalle MD    Subjective   Subjective     CC:  S/p chest tube    HPI:  Doing okay this am, denies any issues overnight. Having some pain at CT site.     ROS:  Gen- No fevers, chills  CV- No chest pain, palpitations  Resp- No cough, dyspnea  GI- No N/V/D, abd pain        Objective   Objective     Vital Signs:   Temp:  [96.9 °F (36.1 °C)-98.6 °F (37 °C)] 98.6 °F (37 °C)  Heart Rate:  [] 101  Resp:  [16-18] 16  BP: (106-124)/(64-83) 111/70  Flow (L/min):  [2] 2     Physical Exam:  Constitutional: No acute distress, awake, alert  HENT: NCAT, mucous membranes moist  Respiratory: Clear to auscultation bilaterally, respiratory effort normal, left CT in place  Cardiovascular: RRR, no murmurs, rubs, or gallops  Gastrointestinal: Positive bowel sounds, soft, nontender, nondistended  Musculoskeletal: No bilateral ankle edema  Psychiatric: Appropriate affect, cooperative  Neurologic: Oriented x 3, strength symmetric in all extremities, Cranial Nerves grossly intact to confrontation, speech clear  Skin: No rashes    Results Reviewed:  LAB RESULTS:      Lab 12/10/22  0641 22  0523 22  0722   WBC 8.36 9.01 9.52   HEMOGLOBIN 8.6* 8.4* 9.4*   HEMATOCRIT 27.5* 27.3* 29.7*   PLATELETS 386 367 390   NEUTROS ABS 4.75 5.40 6.38   IMMATURE GRANS (ABS) 0.20* 0.27* 0.18*   LYMPHS ABS 2.22 2.00 1.75   MONOS ABS 1.03* 1.21* 1.13*   EOS ABS 0.14 0.12 0.06   MCV 79.3 79.1 79.0   SED RATE  --   --  87*   CRP  --   --  10.34*   PROTIME  --   --  15.3*         Lab 12/10/22  0641 22  0523 22  0722   SODIUM 131* 128* 130*   POTASSIUM 3.9 4.4 4.0   CHLORIDE 94* 94* 93*   CO2 29.0 27.0 25.0   ANION GAP 8.0 7.0 12.0   BUN 8 7* 10   CREATININE 0.61* 0.74* 0.55*   EGFR 101.4 95.7 104.6   GLUCOSE 137* 123* 131*    CALCIUM 9.2 9.1 9.4   TSH  --   --  1.410         Lab 12/09/22  0523   TOTAL PROTEIN 7.3   ALBUMIN 2.60*   GLOBULIN 4.7   ALT (SGPT) 23   AST (SGOT) 17   BILIRUBIN 0.4   ALK PHOS 96         Lab 12/08/22  0722   PROTIME 15.3*   INR 1.22*             Lab 12/08/22  0722   IRON 19*   IRON SATURATION 7*   TIBC 259*   TRANSFERRIN 174*   FERRITIN 869.20*         Brief Urine Lab Results     None          Microbiology Results Abnormal     Procedure Component Value - Date/Time    MRSA Screen, PCR (Inpatient) - Swab, Nares [[id number]]  (Normal) Collected: 12/09/22 1209    Lab Status: Final result Specimen: Swab from Nares Updated: 12/09/22 1424     MRSA PCR Negative    Narrative:      The negative predictive value of this diagnostic test is high and should only be used to consider de-escalating anti-MRSA therapy. A positive result may indicate colonization with MRSA and must be correlated clinically.  MRSA Negative          XR Chest 1 View    Result Date: 12/10/2022  DATE OF EXAM: 12/10/2022 5:12 AM  PROCEDURE: XR CHEST 1 VW-  INDICATIONS: Empyema  COMPARISON: 12/9/2022  TECHNIQUE: Single radiographic AP view of the chest was obtained.  FINDINGS: Left pleural catheter is again seen. Mildly decreased loculated left pleural effusion with improved aeration of the left lung base. Unchanged cardiac silhouette. No pneumothorax. No acute osseous abnormalities. Visualized upper abdomen is unremarkable.      Impression: Mildly decreased loculated left pleural effusion with improved aeration of the left lung base.  This report was finalized on 12/10/2022 8:53 AM by Mauro Baron.      XR Chest 1 View    Result Date: 12/9/2022  EXAMINATION: XR CHEST 1 VW-  DATE OF EXAM: 12/9/2022 2:37 AM  INDICATION: Empyema, pneumonia  COMPARISON: Chest radiograph dated 11/09/2022  TECHNIQUE: Portable AP view of the chest was obtained.  FINDINGS: The cardiac silhouette is within normal limits. There is aortic arch atherosclerotic calcification.  Pulmonary vascularity appears normal. There is a smallbore chest tube projecting at the left upper quadrant, likely within the posterior aspect of the thoracic cavity. There is decreased airspace consolidation within the left lung compared to the prior examination. There is no evidence of pneumothorax. There are degenerative changes of the thoracic spine.      Impression: 1. New small bore right-sided chest tube likely within the posterior left pleural space. Decreased left-sided airspace disease compared to the prior exam.  This report was finalized on 12/9/2022 7:28 AM by Gio Barney MD.      CT Guided Chest Tube    Result Date: 12/8/2022  CT GUIDED ABSCESS DRAIN LUNG-, CT GUIDED CHEST TUBE PLACEMENT-                                                         History: left lung mass; R91.8-Other nonspecific abnormal finding of lung field    : Ollie Segura MD.                                                                            Modality: Computed tomography  Radiation dose for this procedure was 463 mGy-cm.           DOSE REDUCTION: The examination was performed according to departmental dose-optimization program which includes automated exposure control, adjustment of the mA and/or kV according to patient size and/or use of iterative reconstruction technique.                                                                                         SEDATION: Moderate sedation was administered. 2 milligram of Versed and 150 micrograms of fentanyl IV was used for moderate sedation monitored under my direction. Total intra service time of sedation was 35 minutes. The patient's vital signs were monitored throughout the procedure and recorded in the patient's medical record by the nurse.  Anesthesia: Lidocaine, local infiltration.  Estimated blood loss:  < 5 cc.          Technique: A thorough discussion of the risks, benefits, and alternatives of the procedure, and if applicable, moderate  sedation, was carried out with the patient. They were encouraged to ask any questions. Any questions were answered. They verbalized understanding. A written informed consent was then signed.   A multi-component timeout was performed prior to starting the procedure using the departmental protocol.  The procedure room personnel used personal protective equipment. The operators used sterile gloves and if indicated, sterile gowns. The surgical site was prepped with chlorhexidine gluconate  and draped in the maximal applicable sterile fashion.  The patient was positioned supine on the CT table. A preliminary CT was performed to assess the target and determine a safe access site, distance and angle to the target. A left anterior intercostal intercostal access site was selected. The site was sterilely prepped with chlorhexidine gluconate and draped. After local anesthesia infiltration, a dermatotomy was performed.  Using aseptic precautions, under CT guidance, the target lesion was accessed with a 17-gauge guide. The lesion is apparently necrotic on the CT. Therefore I aspirated first and recovered approximately 13 cc of thick yellowish-greenish pus. The specimen was labeled and sent to the lab. The guide was withdrawn and an aseptic dressing applied.  The left pleural effusion seen on 10/26/2022 chest CT has progressed into a large left posterior/lateral loculated fluid collection, most likely an empyema with thick walls. I discussed the case with Dr. Barksdale and we agreed to place a small bore pleural drain. The patient was rolled up on the right side. Using CT guidance, after local anesthesia and dermatotomy, an 18-gauge Chiba needle was advanced into the collection with recovery of pus. Over a guidewire, an 8.5 Greenlandic locking pigtail catheter was advanced into the empyema, the pigtail locked and the catheter connected to a Pleur-evac. The catheter was secured to skin with nonabsorbable suture and stay fix.  The patient  tolerated the procedure well. After recovery, the patient was discharged from the department in stable condition. The agreed-upon plan is to admit the patient to the hospitalist service with follow-up by pulmonary service.           Complications: None immediate. There is replacement of the pus in the left lingular lobe lung abscess by air. The abscess appears much smaller. The pigtail catheter is in good position.  Cores: Number of Cores if obtained: 2  Specimen: The specimen was labeled and sent to the lab in appropriate carriers & containers if such was requested by the ordering provider.                                                               Impression: Impression:                                                              Successful CT guided aspiration of a left lung mass that turned out to be a lung abscess, likely related to the history of prior aspiration.  Successful CT-guided placement of an 8.5 Uzbek drainage catheter in the left empyema.  Thank you for the opportunity to assist in the care of your patient.  This report was finalized on 12/8/2022 11:58 AM by Ollie Segura MD.      CT Guided Abscess Drain Lung    Result Date: 12/8/2022  CT GUIDED ABSCESS DRAIN LUNG-, CT GUIDED CHEST TUBE PLACEMENT-                                                         History: left lung mass; R91.8-Other nonspecific abnormal finding of lung field    : Ollie Segura MD.                                                                            Modality: Computed tomography  Radiation dose for this procedure was 463 mGy-cm.           DOSE REDUCTION: The examination was performed according to departmental dose-optimization program which includes automated exposure control, adjustment of the mA and/or kV according to patient size and/or use of iterative reconstruction technique.                                                                                         SEDATION: Moderate sedation was  administered. 2 milligram of Versed and 150 micrograms of fentanyl IV was used for moderate sedation monitored under my direction. Total intra service time of sedation was 35 minutes. The patient's vital signs were monitored throughout the procedure and recorded in the patient's medical record by the nurse.  Anesthesia: Lidocaine, local infiltration.  Estimated blood loss:  < 5 cc.          Technique: A thorough discussion of the risks, benefits, and alternatives of the procedure, and if applicable, moderate sedation, was carried out with the patient. They were encouraged to ask any questions. Any questions were answered. They verbalized understanding. A written informed consent was then signed.   A multi-component timeout was performed prior to starting the procedure using the departmental protocol.  The procedure room personnel used personal protective equipment. The operators used sterile gloves and if indicated, sterile gowns. The surgical site was prepped with chlorhexidine gluconate  and draped in the maximal applicable sterile fashion.  The patient was positioned supine on the CT table. A preliminary CT was performed to assess the target and determine a safe access site, distance and angle to the target. A left anterior intercostal intercostal access site was selected. The site was sterilely prepped with chlorhexidine gluconate and draped. After local anesthesia infiltration, a dermatotomy was performed.  Using aseptic precautions, under CT guidance, the target lesion was accessed with a 17-gauge guide. The lesion is apparently necrotic on the CT. Therefore I aspirated first and recovered approximately 13 cc of thick yellowish-greenish pus. The specimen was labeled and sent to the lab. The guide was withdrawn and an aseptic dressing applied.  The left pleural effusion seen on 10/26/2022 chest CT has progressed into a large left posterior/lateral loculated fluid collection, most likely an empyema with thick  walls. I discussed the case with Dr. Barksdale and we agreed to place a small bore pleural drain. The patient was rolled up on the right side. Using CT guidance, after local anesthesia and dermatotomy, an 18-gauge Chiba needle was advanced into the collection with recovery of pus. Over a guidewire, an 8.5 Belgian locking pigtail catheter was advanced into the empyema, the pigtail locked and the catheter connected to a Pleur-evac. The catheter was secured to skin with nonabsorbable suture and stay fix.  The patient tolerated the procedure well. After recovery, the patient was discharged from the department in stable condition. The agreed-upon plan is to admit the patient to the hospitalist service with follow-up by pulmonary service.           Complications: None immediate. There is replacement of the pus in the left lingular lobe lung abscess by air. The abscess appears much smaller. The pigtail catheter is in good position.  Cores: Number of Cores if obtained: 2  Specimen: The specimen was labeled and sent to the lab in appropriate carriers & containers if such was requested by the ordering provider.                                                               Impression: Impression:                                                              Successful CT guided aspiration of a left lung mass that turned out to be a lung abscess, likely related to the history of prior aspiration.  Successful CT-guided placement of an 8.5 Belgian drainage catheter in the left empyema.  Thank you for the opportunity to assist in the care of your patient.  This report was finalized on 12/8/2022 11:58 AM by Ollie Segura MD.            I have reviewed the medications:  Scheduled Meds:atorvastatin, 20 mg, Oral, Daily  cefTRIAXone, 2 g, Intravenous, Q24H  dornase alpha (PULMOZYME) 5 mg in sterile water, 5 mg, Intrapleural, BID  finasteride, 5 mg, Oral, Daily  lisinopril, 40 mg, Oral, Q24H  metoprolol succinate XL, 100 mg, Oral,  Daily  metroNIDAZOLE, 500 mg, Oral, Q8H  sodium chloride, 10 mL, Intravenous, Q12H      Continuous Infusions:   PRN Meds:.•  acetaminophen **OR** acetaminophen **OR** acetaminophen  •  ondansetron **OR** ondansetron  •  sodium chloride  •  sodium chloride  •  traMADol    Assessment & Plan   Assessment & Plan     Active Hospital Problems    Diagnosis  POA   • **Bronchial pneumonia [J18.0]  Yes   • Empyema lung (HCC) [J86.9]  Yes   • Chest tube in place [Z96.89]  Yes   • Weight loss [R63.4]  Yes   • Benign hypertrophy of prostate [N40.0]  Yes   • Mixed hyperlipidemia [E78.2]  Yes   • Essential hypertension, benign [I10]  Yes      Resolved Hospital Problems   No resolved problems to display.        Brief Hospital Course to date:  Rhett Isaacs is a 72 y.o. male nonsmoker w/ PMH of HTN, HLD, BPH and severe GERD symptoms occasional nocturnal vomiting/reflux, who has been evaluated since September 2022 for LT lung lesion now s/p needle aspiration with concern for abscess and subsequent chest tube placement     Assessment/Plan     LT lung lesion  Loculated LT pleural effusion s/p aspiration and chest tube  Lipoid pneumonia?  -s/p bronch w/ Bx 11/9/22 - path c/w lipoid pna, cx data negative  -chest tube placed by IR 12/8/22 w/ milky white drainage  -cultures sent and pending  -SLP consulted and have signed off  -pulm consulted, Dr. Waldrop managing CT s/p pulmozyme and alteplase   -- continue Ceftriaxone and Flagyl per ID     Severe GERD w/ persistent symptoms  Intolerance to PPIs  Abnormal weight loss  New microcytic anemia  -approx 40lb weight loss over 3 months  -needs EGD to r/o GI malignancy, pt/wife prefer opt f/u w/ Dr. Torres  - TSH wnl, CRP elevated, iron profile c/w AOCD     Mild hyponatremia of unclear significance  -poor oral intake for weeks/months  -s/p IVF x1 bag  -hold HCTZ  -- Na slightly better this am, will monitor for now  -labs in the AM     HTN  HLD  BPH  -home atorvastatin, finasteride,  lisinopril, metoprolol xl       Expected Discharge Location and Transportation: home  Expected Discharge Date: 12/12/22    DVT prophylaxis:  Mechanical DVT prophylaxis orders are present.     AM-PAC 6 Clicks Score (PT): 21 (12/09/22 0904)    CODE STATUS:   Code Status and Medical Interventions:   Ordered at: 12/08/22 1258     Code Status (Patient has no pulse and is not breathing):    CPR (Attempt to Resuscitate)     Medical Interventions (Patient has pulse or is breathing):    Full Support       Alissa Ashley MD  12/10/22

## 2022-12-10 NOTE — PROGRESS NOTES
Malnutrition Severity Assessment    Patient Name:  Rhett Isaacs  YOB: 1950  MRN: [id number]  Admit Date:  12/8/2022    Patient meets criteria for : Severe Malnutrition (Pt meets criteria for severe acute malnutrition based on wt loss w wasting.)    Comments:      Malnutrition Severity Assessment  Malnutrition Type: Acute Disease or Injury - Related Malnutrition  Malnutrition Type (last 8 hours)     Malnutrition Severity Assessment     Row Name 12/09/22 2020       Malnutrition Severity Assessment    Malnutrition Type Acute Disease or Injury - Related Malnutrition    Row Name 12/09/22 2020       Insufficient Energy Intake     Insufficient Energy Intake Findings --  unable to quantify    Row Name 12/09/22 2020       Unintentional Weight Loss     Unintentional Weight Loss Findings Severe    Unintentional Weight Loss  Weight loss greater than 5% in one month    Row Name 12/09/22 2020       Muscle Loss    Loss of Muscle Mass Findings Moderate    [place] --  mild    Clavicle Bone Region Severe - protruding prominent bone    Acromion Bone Region Moderate - acromion may slightly protrude    Scapular Bone Region Severe - prominent bones, depressions easily visible between ribs, scapula, spine, shoulders    Dorsal Hand Region --  mild    Patellar Region Moderate - patella more prominent, less muscle definition around patella    Anterior Thigh Region --  mild    Posterior Calf Region Moderate - some roundness, slight firmness    Row Name 12/09/22 2020       Fat Loss    Subcutaneous Fat Loss Findings Moderate    Orbital Region  Moderate -  somewhat hollowness, slightly dark circles    Upper Arm Region Moderate - some fat tissue, not ample    Thoracic & Lumbar Region Severe - ribs visible with prominent depressions, iliac crest very prominent    Row Name 12/09/22 2020       Criteria Met (Must meet criteria for severity in at least 2 of these categories: M Wasting, Fat Loss, Fluid, Secondary Signs, Wt.  Status, Intake)    Patient meets criteria for  Severe Malnutrition  Pt meets criteria for severe acute malnutrition based on wt loss w wasting.                Electronically signed by:  Gi Livingston RD  12/09/22 21:18 EST

## 2022-12-10 NOTE — PROGRESS NOTES
Clinical Nutrition     Nutrition Assessment  Reason for Visit:   Identified at risk by screening criteria, MST score 2+, Malnutrition Severity Assessment      Patient Name: Rhett Isaacs  YOB: 1950  MRN: [id number]  Date of Encounter: 12/09/22 21:09 EST  Admission date: 12/8/2022      Comments:    Pt meets criteria for severe acute malnutrition based on wt loss w wasting. See flowsheet note.        Admission Diagnosis    Empyema lung (HCC) [J86.9]  Lipoid pneumonia (HCC) [J69.1]     Hospital Problem List    Bronchial pneumonia    Benign hypertrophy of prostate    Mixed hyperlipidemia    Essential hypertension, benign    Chest tube in place    Weight loss    Empyema lung (HCC)    Anemia   GERD    Other Applicable:     Applicable Interval History:      Applicable PMH/PSxH:     PMH: He  has a past medical history of Acute bronchitis, Acute prostatitis, Acute serous otitis media, Benign prostatic hyperplasia, Cancer ([facility]) (09/2015), Cataract, Colon polyp (10/2017), Diabetes mellitus ([facility]) (06/1967), Diverticulosis (2004), Dysuria, Fatigue, GERD (gastroesophageal reflux disease) (2016), Hyperlipidemia, Hypertension, Impacted cerumen, Prostatitis, PVC (premature ventricular contraction), and Wears glasses.   PSxH: He  has a past surgical history that includes Excision basal cell carcinoma (2015); [place] tooth extraction; Tonsillectomy; Adenoidectomy (1956); Eye surgery (September 2015); Colonoscopy (10/23/2017); and Bronchoscopy (N/A, 11/9/2022).         Diet/Nutrition Related History:     Pt confirms wt loss and allows intake has been better lately. Will use suppl several x/da.      Labs reviewed     Note low serum Na  Results from last 7 days   Lab Units 12/09/22  0523 12/08/22  0722   GLUCOSE mg/dL 123* 131*   BUN mg/dL 7* 10   CREATININE mg/dL 0.74* 0.55*   SODIUM mmol/L 128* 130*   CHLORIDE mmol/L 94* 93*   POTASSIUM mmol/L 4.4 4.0   ALT (SGPT) U/L 23  --      Results from last 7  "days   Lab Units 12/09/22  0523 12/08/22  0722   ALBUMIN g/dL 2.60*  --    CRP mg/dL  --  10.34*                  Lab Results   Lab Value Date/Time    HGBA1C 5.8 09/11/2014 1142       Medications reviewed   Yes  Abx      Intake/Ouptut 24 hrs reviewed   Yes  Intake & Output (last day)       12/09 0701  12/10 0700    P.O. 355    IV Piggyback 100    Total Intake(mL/kg) 455 (7)    Urine (mL/kg/hr) 1275 (1.4)    Drains 90    Total Output 1365    Net -910                 Anthropometrics     Admission Height 175.3 cm (69.02\") Documented at 12/08/2022 1407   Admission Weight 64.9 kg (143 lb) Documented at 12/08/2022 1407   standing wt on 12/8 per EMR    Height: 175.3 cm (69.02\")    Last filed wt: Weight: 64.9 kg (143 lb) (12/08/22 1407)  Weight Method: Stated    BMI: BMI (Calculated): 21.1  Normal: 18.5-24.9kg/m2    Ideal Body Weight (IBW) (kg): 73.73    Weight Change   UBW: standing wt of 161 lbs on 11/7   Weight change:18 lbs  % wt change: 11%  Time frame of weight loss: 1 mo   Note rpt wt of 170 lbs on 10/12      Nutrition Focused Physical Exam  Date:12/9      Pt meets criteria for severe acute malnutrition based on wt loss w wasting. See flowsheet note.      Current Nutrition Prescription     PO: Diet: Regular/House Diet; Texture: Regular Texture (IDDSI 7); Fluid Consistency: Thin (IDDSI 0)  Orders Placed This Encounter      DIET MESSAGE NO tea. Add OJ at bfst. Add soup L & D      Dietary Nutrition Supplements Boost Plus; chocolate   4x/da added per RD    Intake: 69% x 4 meals      Nutrition Diagnosis     12/9  Problem Malnutrition  severe acute   Etiology Incr energy needs w condition   Signs/Symptoms Wt hx w wasting   Status:    Goal:   General: Nutrition to support treatment  PO: Increase intake for repletion  Additional goals:      Nutrition Intervention     Follow treatment progress, Care plan reviewed, Advise alternate selection, Menu provided, Menu adjusted, Supplement provided      Monitoring/Evaluation:   Per " protocol, I&O, PO intake, Supplement intake, Pertinent labs, Weight, Symptoms        Gi Livingston RD,   Time Spent: 30 min

## 2022-12-11 ENCOUNTER — APPOINTMENT (OUTPATIENT)
Dept: GENERAL RADIOLOGY | Facility: HOSPITAL | Age: 72
End: 2022-12-11

## 2022-12-11 ENCOUNTER — APPOINTMENT (OUTPATIENT)
Dept: CT IMAGING | Facility: HOSPITAL | Age: 72
End: 2022-12-11

## 2022-12-11 LAB
ANION GAP SERPL CALCULATED.3IONS-SCNC: 9 MMOL/L (ref 5–15)
BASOPHILS # BLD AUTO: 0.02 10*3/MM3 (ref 0–0.2)
BASOPHILS NFR BLD AUTO: 0.2 % (ref 0–1.5)
BUN SERPL-MCNC: 13 MG/DL (ref 8–23)
BUN/CREAT SERPL: 25 (ref 7–25)
CALCIUM SPEC-SCNC: 9 MG/DL (ref 8.6–10.5)
CHLORIDE SERPL-SCNC: 96 MMOL/L (ref 98–107)
CO2 SERPL-SCNC: 26 MMOL/L (ref 22–29)
CREAT SERPL-MCNC: 0.52 MG/DL (ref 0.76–1.27)
DEPRECATED RDW RBC AUTO: 48.1 FL (ref 37–54)
EGFRCR SERPLBLD CKD-EPI 2021: 106.4 ML/MIN/1.73
EOSINOPHIL # BLD AUTO: 0.12 10*3/MM3 (ref 0–0.4)
EOSINOPHIL NFR BLD AUTO: 1.4 % (ref 0.3–6.2)
ERYTHROCYTE [DISTWIDTH] IN BLOOD BY AUTOMATED COUNT: 16.9 % (ref 12.3–15.4)
GLUCOSE SERPL-MCNC: 110 MG/DL (ref 65–99)
HCT VFR BLD AUTO: 29.7 % (ref 37.5–51)
HGB BLD-MCNC: 9.1 G/DL (ref 13–17.7)
IMM GRANULOCYTES # BLD AUTO: 0.21 10*3/MM3 (ref 0–0.05)
IMM GRANULOCYTES NFR BLD AUTO: 2.4 % (ref 0–0.5)
LYMPHOCYTES # BLD AUTO: 2.12 10*3/MM3 (ref 0.7–3.1)
LYMPHOCYTES NFR BLD AUTO: 24 % (ref 19.6–45.3)
MCH RBC QN AUTO: 24.4 PG (ref 26.6–33)
MCHC RBC AUTO-ENTMCNC: 30.6 G/DL (ref 31.5–35.7)
MCV RBC AUTO: 79.6 FL (ref 79–97)
MONOCYTES # BLD AUTO: 1.05 10*3/MM3 (ref 0.1–0.9)
MONOCYTES NFR BLD AUTO: 11.9 % (ref 5–12)
NEUTROPHILS NFR BLD AUTO: 5.33 10*3/MM3 (ref 1.7–7)
NEUTROPHILS NFR BLD AUTO: 60.1 % (ref 42.7–76)
NRBC BLD AUTO-RTO: 0 /100 WBC (ref 0–0.2)
PLATELET # BLD AUTO: 406 10*3/MM3 (ref 140–450)
PMV BLD AUTO: 9.2 FL (ref 6–12)
POTASSIUM SERPL-SCNC: 4.1 MMOL/L (ref 3.5–5.2)
RBC # BLD AUTO: 3.73 10*6/MM3 (ref 4.14–5.8)
SODIUM SERPL-SCNC: 131 MMOL/L (ref 136–145)
WBC NRBC COR # BLD: 8.85 10*3/MM3 (ref 3.4–10.8)

## 2022-12-11 PROCEDURE — 3E0L3GC INTRODUCTION OF OTHER THERAPEUTIC SUBSTANCE INTO PLEURAL CAVITY, PERCUTANEOUS APPROACH: ICD-10-PCS | Performed by: INTERNAL MEDICINE

## 2022-12-11 PROCEDURE — 25010000002 CEFTRIAXONE PER 250 MG: Performed by: INTERNAL MEDICINE

## 2022-12-11 PROCEDURE — 99232 SBSQ HOSP IP/OBS MODERATE 35: CPT | Performed by: INTERNAL MEDICINE

## 2022-12-11 PROCEDURE — 71045 X-RAY EXAM CHEST 1 VIEW: CPT

## 2022-12-11 PROCEDURE — 71250 CT THORAX DX C-: CPT

## 2022-12-11 PROCEDURE — 85025 COMPLETE CBC W/AUTO DIFF WBC: CPT | Performed by: INTERNAL MEDICINE

## 2022-12-11 PROCEDURE — 80048 BASIC METABOLIC PNL TOTAL CA: CPT | Performed by: INTERNAL MEDICINE

## 2022-12-11 RX ADMIN — METRONIDAZOLE 500 MG: 500 TABLET ORAL at 06:17

## 2022-12-11 RX ADMIN — Medication 10 ML: at 08:53

## 2022-12-11 RX ADMIN — METRONIDAZOLE 500 MG: 500 TABLET ORAL at 21:42

## 2022-12-11 RX ADMIN — CEFTRIAXONE 2 G: 2 INJECTION, POWDER, FOR SOLUTION INTRAMUSCULAR; INTRAVENOUS at 21:42

## 2022-12-11 RX ADMIN — ACETAMINOPHEN 650 MG: 325 TABLET, FILM COATED ORAL at 17:26

## 2022-12-11 RX ADMIN — ATORVASTATIN CALCIUM 20 MG: 20 TABLET, FILM COATED ORAL at 08:53

## 2022-12-11 RX ADMIN — Medication 10 ML: at 21:43

## 2022-12-11 RX ADMIN — METOPROLOL SUCCINATE 100 MG: 100 TABLET, EXTENDED RELEASE ORAL at 08:53

## 2022-12-11 RX ADMIN — METRONIDAZOLE 500 MG: 500 TABLET ORAL at 14:15

## 2022-12-11 RX ADMIN — LISINOPRIL 40 MG: 40 TABLET ORAL at 08:53

## 2022-12-11 RX ADMIN — FINASTERIDE 5 MG: 5 TABLET, FILM COATED ORAL at 08:53

## 2022-12-11 RX ADMIN — ACETAMINOPHEN 650 MG: 325 TABLET, FILM COATED ORAL at 04:24

## 2022-12-11 NOTE — PROGRESS NOTES
Instilled Alteplase and Dornase into left chest tube and stopcock was closed. Instructed patient and RN to turn q15 minutes from left to right for 2 hours then open stopcock on chest tube.     Electronically signed by EAN Mcnamara, 12/10/22, 10:24 PM EST.

## 2022-12-11 NOTE — PROGRESS NOTES
Intensive Care Follow-up     Hospital:  LOS: 3 days   Mr. Rhett Isaacs, 72 y.o. male is followed for:   Bronchial pneumonia            History of present illness:   72-year-old male with a past medical history significant for BPH, hypertension, and hyperlipidemia.  Who initially presented to [facility] underwent bronchoscopy on 11/9/2022 for a lingula pneumonia.  And also an associated small pleural effusion at the time.  He underwent bronchoscopy which showed no signs of malignancy.  Therefore CT-guided needle biopsy was recommended.  At the time of the CT-guided needle biopsy of the infiltrated had actually improved, but the pleural effusion had worsened.  IR had decided to sample the pleural fluid as well and pus was discovered.  Patient was admitted with an empyema and pulmonary is following for chest tube/empyema management.      Subjective   Interval History:  Patient doing well this morning.  Chest tube output has been fairly minimal over last 24 hours.  Despite tPA and dornase.  He is currently afebrile and normotensive.             The patient's past medical, surgical and social history were reviewed and updated in Epic as appropriate.       Objective     Infusions:     Medications:  atorvastatin, 20 mg, Oral, Daily  cefTRIAXone, 2 g, Intravenous, Q24H  finasteride, 5 mg, Oral, Daily  lisinopril, 40 mg, Oral, Q24H  metoprolol succinate XL, 100 mg, Oral, Daily  metroNIDAZOLE, 500 mg, Oral, Q8H  sodium chloride, 10 mL, Intravenous, Q12H      I reviewed the patient's medications.    Vital Sign Min/Max for last 24 hours  Temp  Min: 96.8 °F (36 °C)  Max: 98.3 °F (36.8 °C)   BP  Min: 107/67  Max: 121/78   Pulse  Min: 86  Max: 109   Resp  Min: 16  Max: 18   SpO2  Min: 93 %  Max: 96 %   Flow (L/min)  Min: 2  Max: 2       Input/Output for last 24 hour shift  12/10 0701 - 12/11 0700  In: 1050 [P.O.:1050]  Out: 1130 [Urine:1100; Drains:30]      GENERAL : NAD, conversant  RESPIRATORY/THORAX : normal  respiratory effort and no intercostal retractions, CTAB  CARDIOVASCULAR : Normal S1/S2, RRR. no lower ext edema.  GASTROINTESTINAL : Soft, NT/ND. BS x 4 normoactive. No hepatosplenomegaly.  MUSCULOSKELETAL : No cyanosis, clubbing, or ischemia  NEUROLOGICAL: alert and oriented to person, place and time  PSYCHOLOGICAL : Appropriate affect    Results from last 7 days   Lab Units 12/11/22  0456 12/10/22  0641 12/09/22  0523   WBC 10*3/mm3 8.85 8.36 9.01   HEMOGLOBIN g/dL 9.1* 8.6* 8.4*   PLATELETS 10*3/mm3 406 386 367     Results from last 7 days   Lab Units 12/11/22  0456 12/10/22  0641 12/09/22 0523   SODIUM mmol/L 131* 131* 128*   POTASSIUM mmol/L 4.1 3.9 4.4   CO2 mmol/L 26.0 29.0 27.0   BUN mg/dL 13 8 7*   CREATININE mg/dL 0.52* 0.61* 0.74*   GLUCOSE mg/dL 110* 137* 123*     Estimated Creatinine Clearance: 116.1 mL/min (A) (by C-G formula based on SCr of 0.52 mg/dL (L)).          I reviewed the patient's new clinical results.  I reviewed the patient's new imaging results/reports including actual images and agree with reports.       Imaging Results (Last 24 Hours)     Procedure Component Value Units Date/Time    XR Chest 1 View [[id number]] Collected: 12/11/22 0817     Updated: 12/11/22 0820    Narrative:      DATE OF EXAM: 12/11/2022 2:25 AM     PROCEDURE: XR CHEST 1 VW-     INDICATIONS: Chest tube     COMPARISON: One day prior     TECHNIQUE: Single radiographic AP view of the chest was obtained.     FINDINGS:  Small bore left-sided chest tube projects unchanged. Dense opacity in  the left midlung field is similar to comparison. There is no significant  effusion or distinct pneumothorax. The right lung remains clear.  Unchanged heart and mediastinal contours.        Impression:      Small bore left-sided chest tube projects unchanged. Dense opacity in  the left midlung field is similar to comparison. There is no significant  effusion or distinct pneumothorax. The right lung remains clear.  Unchanged heart and  mediastinal contours.      This report was finalized on 12/11/2022 8:17 AM by Rhett Raygoza.             Assessment & Plan   Impression        Bronchial pneumonia    Benign hypertrophy of prostate    Mixed hyperlipidemia    Essential hypertension, benign    Chest tube in place    Weight loss    Empyema lung (HCC)    Severe malnutrition (HCC)       Plan        72-year-old male with a past medical history significant for BPH, hypertension, and hyperlipidemia.  Who initially presented to [facility] underwent bronchoscopy on 11/9/2022 for a lingula pneumonia.  And also an associated small pleural effusion at the time.  He underwent bronchoscopy which showed no signs of malignancy.  Therefore CT-guided needle biopsy was recommended.  At the time of the CT-guided needle biopsy of the infiltrated had actually improved, but the pleural effusion had worsened.  IR had decided to sample the pleural fluid as well and pus was discovered.  Patient was admitted with an empyema and pulmonary is following for chest tube/empyema management.     -Keep chest tube to suction  -Given minimal output we will go ahead and stop tPA/dornase, I am going to get a CT scan of the chest today.  If there is still a fluid collection then we will likely plan to have CT surgery evaluate the patient early next week for consideration of decortication.  If the fluid collection has actually resolved and what we are seeing on the chest x-ray is just the original pneumonia then will discontinue chest tube and continue antibiotics.  -Continue antibiotics per infectious disease recommendations  -Mobilize patient  -Aggressive pulmonary toilet    Plan of care and goals reviewed with multidisciplinary/antibiotic stewardship team during rounds.   I discussed the patient's findings and my recommendations with patient, family and nursing staff       Marley Barksdale, DO  Pulmonary, Critical care and Sleep Medicine

## 2022-12-11 NOTE — PLAN OF CARE
Goal Outcome Evaluation:  Plan of Care Reviewed With: patient        Progress: improving  Outcome Evaluation: VSS. SR on tele. Denies CP or SOA. Alteplace and Dornase given intraplerual by ELROY Erazo. Pt tolerated well. Turned q15 mins for 2 hours. Opened stopcock and noted minimal output. Also noticed swellinig around CT site. APRN  came to bedside and assessed. CXR done as odered. Awaiting results. No other orders at this time. Monitored closely. Cont POC

## 2022-12-11 NOTE — PROGRESS NOTES
INFECTIOUS DISEASE Progress Note    Rhett Isaacs  1950  [id number]      Admission Date: 12/8/2022      Requesting Provider: Jeremias Hughes MD  Evaluating Physician: George Underwood MD    Reason for Consultation: empyema    History of present illness:    12/9/22: Patient is a 72 y.o. male with h/o BPH, HTN, and HLD who was admitted to [facility] ED from interventional radiology after chest tube placement.  The patient had an aspiration event after vomiting in his sleep in September 2022.  He was on 2 round of antibiotics and improved.  A CT scan on 10/18 showed a 6.3 cm mass along the left anterior pleural margin of DIEGO with loculated pleural effusion.  He was referred to pulmonology and underwent a bronchoscopy by Dr. Hughes on 11/9.  The pathology showed lipoid pneumonia.  All cultures including AFB were negative.  He underwent a CT-guided aspiration of left lung mass on 12/8 which showed that it was an abscess.  He also had a chest tube placed in the left empyema.  Pleural fluid had 89% neutrophils with cell count pending.  Cultures are negative to date and pending.  He is afebrile.  Pertinent labs were CRP 10.34, ESR 87, Ferritin 869, WBC 9500 with 67% neutrophils, and creatinine 0.55. A CXR showed a new small bore right-sided chest tube in left posterior pleural space with decreased left-sided airspace disease. He is currently on Zosyn and Linezolid.  ID was asked to evaluate and manage his antibiotic therapy. The anterior fluid collection was drained with aspiration by interventional radiology and a chest tube was placed in the posterior collection.  He is now draining thick purulent material from the drainage catheter.  Pulmonary medicine has placed alteplase and Pulmozyme to assist with drainage. Gram stain of his pleural fluid reveals moderate white blood cells with moderate gram-positive cocci in pairs and clusters.  His nasal MRSA PCR is negative.    On examination, the patient was very  uncomfortable as Alteplase and Dornase alpha was injected into the pleural cavity and drain clamped.  His wife indicates that he has progressively worsened over the last several weeks with anorexia, weight loss, malaise, and fatigue.  He has lost over 30 pounds.      12/10/22: He has remained afebrile over the last 24 hours. His white blood cell count today is 8.4. He continues to complain of malaise and fatigue.  He has anorexia.  He complains of a metallic taste from the metronidazole. His empyema cultures are growing gram-positive cocci.    12/11/2022: His empyema cultures have grown Streptococcus intermedius.  The isolate is sensitive to vancomycin.  E-strips are being utilized to obtain additional sensitivity testing-I discussed this with the microbiology lab today.  He now feels better.  He has an improved appetite and increased nutritional intake.  He has remained afebrile.  His white blood cell count is 8.9.  He has decreased chest tube output.  His tPA/dornase has been discontinued.  Pulmonary medicine has ordered a follow-up chest CT scan.  His chest x-ray today reveals a dense residual opacity in the left midlung field which is similar to previous x-rays.    Past Medical History:   Diagnosis Date   • Acute bronchitis    • Acute prostatitis    • Acute serous otitis media    • Benign prostatic hyperplasia    • Cancer ([facility]) 09/2015    Basil cell carcinoma   • Cataract     still have them- bilat   • Colon polyp 10/2017   • Diabetes mellitus ([facility]) 06/1967    doesnt checks sugar   • Diverticulosis 2004    Found in first colonoscopy   • Dysuria    • Fatigue    • GERD (gastroesophageal reflux disease) 2016   • Hyperlipidemia    • Hypertension    • Impacted cerumen    • Prostatitis    • PVC (premature ventricular contraction)    • Wears glasses     readers       Past Surgical History:   Procedure Laterality Date   • ADENOIDECTOMY  1956   • BASAL CELL CARCINOMA EXCISION  2015   • BRONCHOSCOPY N/A 11/9/2022     Procedure: BRONCHOSCOPY WITH ENDOBRONCHIAL ULTRASOUND WITH FLUOROSCOPY;  Surgeon: Jeremias Hughes MD;  Location: [facility] ENDOSCOPY;  Service: Pulmonary;  Laterality: N/A;  EBUS balloon removed and intact   • COLONOSCOPY  10/23/2017   • EYE SURGERY  September 2015    Basil cell carcenoma   • TONSILLECTOMY     • WISDOM TOOTH EXTRACTION         Family History   Problem Relation Age of Onset   • Alcohol abuse Father    • Hyperlipidemia Mother    • Hypertension Brother        Social History     Socioeconomic History   • Marital status:    Tobacco Use   • Smoking status: Never   • Smokeless tobacco: Never   Vaping Use   • Vaping Use: Never used   Substance and Sexual Activity   • Alcohol use: No   • Drug use: No   • Sexual activity: Never       Allergies   Allergen Reactions   • Penicillins Diarrhea   • Cardizem [Diltiazem Hcl] Rash   • Sulfa Antibiotics Rash         Medication:    Current Facility-Administered Medications:   •  acetaminophen (TYLENOL) tablet 650 mg, 650 mg, Oral, Q4H PRN, 650 mg at 12/11/22 0424 **OR** acetaminophen (TYLENOL) 160 MG/5ML solution 650 mg, 650 mg, Oral, Q4H PRN **OR** acetaminophen (TYLENOL) suppository 650 mg, 650 mg, Rectal, Q4H PRN, Kaleb Chapman DO  •  atorvastatin (LIPITOR) tablet 20 mg, 20 mg, Oral, Daily, Kaleb Chapman DO, 20 mg at 12/11/22 0853  •  cefTRIAXone (ROCEPHIN) 2 g/100 mL 0.9% NS IVPB (MBP), 2 g, Intravenous, Q24H, George Underwood MD, 2 g at 12/10/22 2048  •  finasteride (PROSCAR) tablet 5 mg, 5 mg, Oral, Daily, Kaleb Chapman DO, 5 mg at 12/11/22 0853  •  lisinopril (PRINIVIL,ZESTRIL) tablet 40 mg, 40 mg, Oral, Q24H, Kaleb Chapman DO, 40 mg at 12/11/22 0853  •  metoprolol succinate XL (TOPROL-XL) 24 hr tablet 100 mg, 100 mg, Oral, Daily, Kaleb Chapman DO, 100 mg at 12/11/22 0853  •  metroNIDAZOLE (FLAGYL) tablet 500 mg, 500 mg, Oral, Q8H, George Underwood MD, 500 mg at 12/11/22 0617  •  ondansetron (ZOFRAN) tablet 4  mg, 4 mg, Oral, Q6H PRN **OR** ondansetron (ZOFRAN) injection 4 mg, 4 mg, Intravenous, Q6H PRN, Kaleb Chapman DO  •  sodium chloride 0.9 % flush 10 mL, 10 mL, Intravenous, Q12H, Kaleb Chapman DO, 10 mL at 22 0853  •  sodium chloride 0.9 % flush 10 mL, 10 mL, Intravenous, PRN, Kaleb Chapman DO  •  sodium chloride 0.9 % infusion 40 mL, 40 mL, Intravenous, PRN, Kaleb Chapman DO  •  traMADol (ULTRAM) tablet 50 mg, 50 mg, Oral, Q6H PRN, Kaleb Chapman DO, 50 mg at 12/10/22 2058    Antibiotics:  Anti-Infectives (From admission, onward)    Ordered     Dose/Rate Route Frequency Start Stop    22 191  metroNIDAZOLE (FLAGYL) tablet 500 mg        Ordering Provider: George Underwood MD    500 mg Oral Every 8 Hours Scheduled 22 2200 22 [id number] 191  cefTRIAXone (ROCEPHIN) 2 g/100 mL 0.9% NS IVPB (MBP)        Ordering Provider: George Underwood MD    2 g  over 30 Minutes Intravenous Every 24 Hours 22 2100 22 1335  piperacillin-tazobactam (ZOSYN) 4.5 g in iso-osmotic dextrose 100 mL IVPB (premix)        Ordering Provider: Jeremias Hughes MD    4.5 g  over 30 Minutes Intravenous Once 22 1430 22 1606            Review of Systems:  See HPI    Physical Exam:   Vital Signs  Temp (24hrs), Av.8 °F (36.6 °C), Min:96.8 °F (36 °C), Max:98.3 °F (36.8 °C)    Temp  Min: 96.8 °F (36 °C)  Max: 98.3 °F (36.8 °C)  BP  Min: 107/67  Max: 121/78  Pulse  Min: 86  Max: 109  Resp  Min: 16  Max: 18  SpO2  Min: 93 %  Max: 96 %    GENERAL: Awake and alert, in moderate distress. Frail appearing.   HEENT: Normocephalic, atraumatic.  PERRL. EOMI. No conjunctival injection. No icterus. Oropharynx clear without evidence of thrush or exudate.  NECK: Supple  HEART: RRR; No murmur, rubs, gallops.   LUNGS: diminished in left lung field, scattered rales in right lung field without wheezing. Normal respiratory effort. Nonlabored. Chest tube in  place in the left posterior chest. Copious thick purulent material has been drained.  ABDOMEN: Soft, nontender, nondistended.  No rebound or guarding. NO mass or HSM.  EXT:  No cyanosis, clubbing or edema. No cord.  :  Without Rivera catheter.  MSK: No joint effusions or erythema  SKIN: Warm and dry without cutaneous eruptions on Inspection/palpation.    NEURO: Oriented to PPT.  Motor 5/5 strength  PSYCHIATRIC: Normal insight and judgment. Cooperative with PE    Laboratory Data    Results from last 7 days   Lab Units 12/11/22  0456 12/10/22  0641 12/09/22  0523   WBC 10*3/mm3 8.85 8.36 9.01   HEMOGLOBIN g/dL 9.1* 8.6* 8.4*   HEMATOCRIT % 29.7* 27.5* 27.3*   PLATELETS 10*3/mm3 406 386 367     Results from last 7 days   Lab Units 12/11/22  0456   SODIUM mmol/L 131*   POTASSIUM mmol/L 4.1   CHLORIDE mmol/L 96*   CO2 mmol/L 26.0   BUN mg/dL 13   CREATININE mg/dL 0.52*   GLUCOSE mg/dL 110*   CALCIUM mg/dL 9.0     Results from last 7 days   Lab Units 12/09/22  0523   ALK PHOS U/L 96   BILIRUBIN mg/dL 0.4   ALT (SGPT) U/L 23   AST (SGOT) U/L 17     Results from last 7 days   Lab Units 12/08/22  0722   SED RATE mm/hr 87*     Results from last 7 days   Lab Units 12/08/22  0722   CRP mg/dL 10.34*                 Estimated Creatinine Clearance: 116.1 mL/min (A) (by C-G formula based on SCr of 0.52 mg/dL (L)).      Microbiology:  Microbiology Results (last 10 days)     Procedure Component Value - Date/Time    MRSA Screen, PCR (Inpatient) - Swab, Nares [[id number]]  (Normal) Collected: 12/09/22 1209    Lab Status: Final result Specimen: Swab from Nares Updated: 12/09/22 1424     MRSA PCR Negative    Narrative:      The negative predictive value of this diagnostic test is high and should only be used to consider de-escalating anti-MRSA therapy. A positive result may indicate colonization with MRSA and must be correlated clinically.  MRSA Negative    Body Fluid Culture - Body Fluid, Lung, Left Lower Lobe [[id number]]  (Abnormal)  Collected: 12/08/22 1143    Lab Status: Preliminary result Specimen: Body Fluid from Lung, Left Lower Lobe Updated: 12/11/22 0936     Body Fluid Culture Moderate growth (3+) Streptococcus intermedius     Gram Stain Many (4+) WBCs seen      No organisms seen    Narrative:      Refer to previous wound culture collected on 12/08/2022 1138 for MICs    Anaerobic Culture - Body Fluid, Lung, Left Lower Lobe [[id number]]  (Normal) Collected: 12/08/22 1143    Lab Status: Preliminary result Specimen: Body Fluid from Lung, Left Lower Lobe Updated: 12/11/22 0820     Anaerobic Culture No anaerobes isolated at 3 days    AFB Culture - Body Fluid, Pleural Cavity [[id number]] Collected: 12/08/22 1143    Lab Status: Preliminary result Specimen: Body Fluid from Pleural Cavity Updated: 12/10/22 1134     AFB Stain No acid fast bacilli seen on concentrated smear    Body Fluid Culture - Body Fluid, Pleural Cavity [[id number]]  (Abnormal)  (Susceptibility) Collected: 12/08/22 1138    Lab Status: Preliminary result Specimen: Body Fluid from Pleural Cavity Updated: 12/11/22 0937     Body Fluid Culture Moderate growth (3+) Streptococcus intermedius     Gram Stain Moderate (3+) WBCs seen      Moderate (3+) Gram positive cocci in pairs and clusters    Narrative:      Penicillin and ceftriaxone to follow     Susceptibility      Streptococcus intermedius      FARHEEN (Preliminary)      Vancomycin Susceptible                           Anaerobic Culture - Body Fluid, Lung, Left Lower Lobe [[id number]]  (Normal) Collected: 12/08/22 1138    Lab Status: Preliminary result Specimen: Body Fluid from Lung, Left Lower Lobe Updated: 12/11/22 0818     Anaerobic Culture No anaerobes isolated at 3 days                Radiology:  XR Chest 1 View    Result Date: 12/11/2022  Small bore left-sided chest tube projects unchanged. Dense opacity in the left midlung field is similar to comparison. There is no significant effusion or distinct pneumothorax. The right lung  remains clear. Unchanged heart and mediastinal contours.  This report was finalized on 12/11/2022 8:17 AM by Rhett Raygoza.      XR Chest 1 View    Result Date: 12/10/2022  Mildly decreased loculated left pleural effusion with improved aeration of the left lung base.  This report was finalized on 12/10/2022 8:53 AM by Mauro Baron.      XR Chest 1 View    Result Date: 12/9/2022  1. New small bore right-sided chest tube likely within the posterior left pleural space. Decreased left-sided airspace disease compared to the prior exam.  This report was finalized on 12/9/2022 7:28 AM by Gio Barney MD.      CT Guided Chest Tube    Result Date: 12/8/2022  Impression:                                                              Successful CT guided aspiration of a left lung mass that turned out to be a lung abscess, likely related to the history of prior aspiration.  Successful CT-guided placement of an 8.5 [place] drainage catheter in the left empyema.  Thank you for the opportunity to assist in the care of your patient.  This report was finalized on 12/8/2022 11:58 AM by Ollie Segura MD.      CT Guided Abscess Drain Lung    Result Date: 12/8/2022  Impression:                                                              Successful CT guided aspiration of a left lung mass that turned out to be a lung abscess, likely related to the history of prior aspiration.  Successful CT-guided placement of an 8.5 [place] drainage catheter in the left empyema.  Thank you for the opportunity to assist in the care of your patient.  This report was finalized on 12/8/2022 11:58 AM by Ollie Segura MD.    I read his Chest x-ray of 12/11      Impression:   1. Strep intermedius left empyemas-he has 2 loculated areas of empyema in the left hemithorax with the largest area posteriorly. These are likely related to aspiration/aspiration pneumonia.  Strep intermedius is generally sensitive to ceftriaxone and I will leave him on his  current regimen of ceftriaxone/metronidazole for the time being.   2. Aspiration pneumonia/lipoid pneumonia  3. Anemia of chronic disease  4. Essential hypertension  5. Benign prostatic hyperplasia  6. Penicillin (diarrhea) and sulfa (rash) allergies.     PLAN/RECOMMENDATIONS:   1. Repeat chest CT scan-pending  2. Ceftriaxone 2 g IV daily  3. Metronidazole 500 mg by mouth 3 times a day    At discharge, I will plan to have him come to our office daily for outpatient intravenous ceftriaxone.  He will likely need at least 3 weeks of intravenous antibiotic therapy.  I discussed his complex situation with both him and his wife in detail today.      George Underwood MD  12/11/2022  12:35 EST

## 2022-12-11 NOTE — PROGRESS NOTES
[facility] Medicine Services  PROGRESS NOTE    Patient Name: Rhett Isaacs  : 1950  MRN: [id number]    Date of Admission: 2022  Primary Care Physician: Elieser Delvalle MD    Subjective   Subjective     CC:  S/p chest tube    HPI:  Didn't get much sleep last night due to various issues (mainly the CT stuff). Otherwise, doing well and denies any complaints.     ROS:  Gen- No fevers, chills  CV- No chest pain, palpitations  Resp- No cough, dyspnea  GI- No N/V/D, abd pain        Objective   Objective     Vital Signs:   Temp:  [96.8 °F (36 °C)-98.3 °F (36.8 °C)] 96.8 °F (36 °C)  Heart Rate:  [] 96  Resp:  [16-18] 18  BP: (107-121)/(63-78) 107/67  Flow (L/min):  [2] 2     Physical Exam:  Constitutional: No acute distress, awake, alert  HENT: NCAT, mucous membranes moist  Respiratory: Clear to auscultation bilaterally, respiratory effort normal, left CT in place  Cardiovascular: RRR, no murmurs, rubs, or gallops  Gastrointestinal: Positive bowel sounds, soft, nontender, nondistended  Musculoskeletal: No bilateral ankle edema  Psychiatric: Appropriate affect, cooperative  Neurologic: Oriented x 3, strength symmetric in all extremities, Cranial Nerves grossly intact to confrontation, speech clear  Skin: No rashes    Results Reviewed:  LAB RESULTS:      Lab 22  0456 12/10/22  0641 22  0523 22  0722   WBC 8.85 8.36 9.01 9.52   HEMOGLOBIN 9.1* 8.6* 8.4* 9.4*   HEMATOCRIT 29.7* 27.5* 27.3* 29.7*   PLATELETS 406 386 367 390   NEUTROS ABS 5.33 4.75 5.40 6.38   IMMATURE GRANS (ABS) 0.21* 0.20* 0.27* 0.18*   LYMPHS ABS 2.12 2.22 2.00 1.75   MONOS ABS 1.05* 1.03* 1.21* 1.13*   EOS ABS 0.12 0.14 0.12 0.06   MCV 79.6 79.3 79.1 79.0   SED RATE  --   --   --  87*   CRP  --   --   --  10.34*   PROTIME  --   --   --  15.3*         Lab 22  0456 12/10/22  0641 22  0523 22  0722   SODIUM 131* 131* 128* 130*   POTASSIUM 4.1 3.9 4.4 4.0   CHLORIDE  96* 94* 94* 93*   CO2 26.0 29.0 27.0 25.0   ANION GAP 9.0 8.0 7.0 12.0   BUN 13 8 7* 10   CREATININE 0.52* 0.61* 0.74* 0.55*   EGFR 106.4 101.4 95.7 104.6   GLUCOSE 110* 137* 123* 131*   CALCIUM 9.0 9.2 9.1 9.4   TSH  --   --   --  1.410         Lab 12/09/22  0523   TOTAL PROTEIN 7.3   ALBUMIN 2.60*   GLOBULIN 4.7   ALT (SGPT) 23   AST (SGOT) 17   BILIRUBIN 0.4   ALK PHOS 96         Lab 12/08/22  0722   PROTIME 15.3*   INR 1.22*             Lab 12/08/22  0722   IRON 19*   IRON SATURATION 7*   TIBC 259*   TRANSFERRIN 174*   FERRITIN 869.20*         Brief Urine Lab Results     None          Microbiology Results Abnormal     Procedure Component Value - Date/Time    AFB Culture - Body Fluid, Pleural Cavity [[id number]] Collected: 12/08/22 1143    Lab Status: Preliminary result Specimen: Body Fluid from Pleural Cavity Updated: 12/10/22 1134     AFB Stain No acid fast bacilli seen on concentrated smear    MRSA Screen, PCR (Inpatient) - Swab, Nares [[id number]]  (Normal) Collected: 12/09/22 1209    Lab Status: Final result Specimen: Swab from Nares Updated: 12/09/22 1424     MRSA PCR Negative    Narrative:      The negative predictive value of this diagnostic test is high and should only be used to consider de-escalating anti-MRSA therapy. A positive result may indicate colonization with MRSA and must be correlated clinically.  MRSA Negative          XR Chest 1 View    Result Date: 12/10/2022  DATE OF EXAM: 12/10/2022 5:12 AM  PROCEDURE: XR CHEST 1 VW-  INDICATIONS: Empyema  COMPARISON: 12/9/2022  TECHNIQUE: Single radiographic AP view of the chest was obtained.  FINDINGS: Left pleural catheter is again seen. Mildly decreased loculated left pleural effusion with improved aeration of the left lung base. Unchanged cardiac silhouette. No pneumothorax. No acute osseous abnormalities. Visualized upper abdomen is unremarkable.      Impression: Mildly decreased loculated left pleural effusion with improved aeration of the left lung  base.  This report was finalized on 12/10/2022 8:53 AM by Mauro Baron.            I have reviewed the medications:  Scheduled Meds:alteplase, 10 mg, Intrapleural, BID  atorvastatin, 20 mg, Oral, Daily  cefTRIAXone, 2 g, Intravenous, Q24H  dornase alpha (PULMOZYME) 5 mg in sterile water, 5 mg, Intrapleural, BID  finasteride, 5 mg, Oral, Daily  lisinopril, 40 mg, Oral, Q24H  metoprolol succinate XL, 100 mg, Oral, Daily  metroNIDAZOLE, 500 mg, Oral, Q8H  sodium chloride, 10 mL, Intravenous, Q12H      Continuous Infusions:   PRN Meds:.•  acetaminophen **OR** acetaminophen **OR** acetaminophen  •  ondansetron **OR** ondansetron  •  sodium chloride  •  sodium chloride  •  traMADol    Assessment & Plan   Assessment & Plan     Active Hospital Problems    Diagnosis  POA   • **Bronchial pneumonia [J18.0]  Yes   • Severe malnutrition (HCC) [E43]  Yes   • Empyema lung (HCC) [J86.9]  Yes   • Chest tube in place [Z96.89]  Yes   • Weight loss [R63.4]  Yes   • Benign hypertrophy of prostate [N40.0]  Yes   • Mixed hyperlipidemia [E78.2]  Yes   • Essential hypertension, benign [I10]  Yes      Resolved Hospital Problems   No resolved problems to display.        Brief Hospital Course to date:  Rhett Isaacs is a 72 y.o. male nonsmoker w/ PMH of HTN, HLD, BPH and severe GERD symptoms occasional nocturnal vomiting/reflux, who has been evaluated since September 2022 for LT lung lesion now s/p needle aspiration with concern for abscess and subsequent chest tube placement     Assessment/Plan     LT lung lesion  Loculated LT pleural effusion s/p aspiration and chest tube  Lipoid pneumonia?  -s/p bronch w/ Bx 11/9/22 - path c/w lipoid pna, cx data negative  -chest tube placed by IR 12/8/22 w/ milky white drainage  -cultures sent, thus far gram positive cocci   -SLP consulted and have signed off  -pulm consulted, Dr. Waldrop managing CT s/p tPA/dornase. Repeat CT scan today to assess need for VATS  -- continue Ceftriaxone and Flagyl  per ID     Severe GERD w/ persistent symptoms  Intolerance to PPIs  Abnormal weight loss  New microcytic anemia  -approx 40lb weight loss over 3 months  -needs EGD to r/o GI malignancy, pt/wife prefer opt f/u w/ Dr. Torres  - TSH wnl, CRP elevated, iron profile c/w AOCD     Mild hyponatremia of unclear significance  -poor oral intake for weeks/months  -s/p IVF x1 bag  -hold HCTZ  -- Na stable, will monitor for now  -labs in the AM     HTN  HLD  BPH  -home atorvastatin, finasteride, lisinopril, metoprolol xl       Expected Discharge Location and Transportation: home  Expected Discharge Date: 12/13/22    DVT prophylaxis:  Mechanical DVT prophylaxis orders are present.     AM-PAC 6 Clicks Score (PT): 21 (12/09/22 3510)    CODE STATUS:   Code Status and Medical Interventions:   Ordered at: 12/08/22 1258     Code Status (Patient has no pulse and is not breathing):    CPR (Attempt to Resuscitate)     Medical Interventions (Patient has pulse or is breathing):    Full Support       Alissa Ashley MD  12/11/22

## 2022-12-11 NOTE — PLAN OF CARE
Goal Outcome Evaluation:  Plan of Care Reviewed With: patient        Progress: no change  Outcome Evaluation: VSS, NSR, minimal complaints of pain, tylenol administered once, see MAR.  chest tube in place, minimal output.  waiting for CT scan.  patient UOP adequate, BM this shift.

## 2022-12-12 ENCOUNTER — APPOINTMENT (OUTPATIENT)
Dept: GENERAL RADIOLOGY | Facility: HOSPITAL | Age: 72
End: 2022-12-12

## 2022-12-12 LAB
ANION GAP SERPL CALCULATED.3IONS-SCNC: 9 MMOL/L (ref 5–15)
ANISOCYTOSIS BLD QL: ABNORMAL
BASOPHILS # BLD MANUAL: 0 10*3/MM3 (ref 0–0.2)
BASOPHILS NFR BLD MANUAL: 0 % (ref 0–1.5)
BUN SERPL-MCNC: 11 MG/DL (ref 8–23)
BUN/CREAT SERPL: 17.5 (ref 7–25)
CALCIUM SPEC-SCNC: 9.6 MG/DL (ref 8.6–10.5)
CHLORIDE SERPL-SCNC: 97 MMOL/L (ref 98–107)
CO2 SERPL-SCNC: 27 MMOL/L (ref 22–29)
CREAT SERPL-MCNC: 0.63 MG/DL (ref 0.76–1.27)
DEPRECATED RDW RBC AUTO: 50.6 FL (ref 37–54)
EGFRCR SERPLBLD CKD-EPI 2021: 100.4 ML/MIN/1.73
EOSINOPHIL # BLD MANUAL: 0.08 10*3/MM3 (ref 0–0.4)
EOSINOPHIL NFR BLD MANUAL: 1 % (ref 0.3–6.2)
ERYTHROCYTE [DISTWIDTH] IN BLOOD BY AUTOMATED COUNT: 17.3 % (ref 12.3–15.4)
GLUCOSE SERPL-MCNC: 142 MG/DL (ref 65–99)
HCT VFR BLD AUTO: 33.4 % (ref 37.5–51)
HGB BLD-MCNC: 10 G/DL (ref 13–17.7)
HYPOCHROMIA BLD QL: ABNORMAL
LYMPHOCYTES # BLD MANUAL: 2.5 10*3/MM3 (ref 0.7–3.1)
LYMPHOCYTES NFR BLD MANUAL: 5 % (ref 5–12)
MAGNESIUM SERPL-MCNC: 2 MG/DL (ref 1.6–2.4)
MCH RBC QN AUTO: 24.3 PG (ref 26.6–33)
MCHC RBC AUTO-ENTMCNC: 29.9 G/DL (ref 31.5–35.7)
MCV RBC AUTO: 81.3 FL (ref 79–97)
METAMYELOCYTES NFR BLD MANUAL: 1 % (ref 0–0)
MONOCYTES # BLD: 0.4 10*3/MM3 (ref 0.1–0.9)
MYELOCYTES NFR BLD MANUAL: 3 % (ref 0–0)
NEUTROPHILS # BLD AUTO: 4.77 10*3/MM3 (ref 1.7–7)
NEUTROPHILS NFR BLD MANUAL: 53 % (ref 42.7–76)
NEUTS BAND NFR BLD MANUAL: 6 % (ref 0–5)
NRBC SPEC MANUAL: 0 /100 WBC (ref 0–0.2)
PHOSPHATE SERPL-MCNC: 3 MG/DL (ref 2.5–4.5)
PLAT MORPH BLD: NORMAL
PLATELET # BLD AUTO: 453 10*3/MM3 (ref 140–450)
PMV BLD AUTO: 9.1 FL (ref 6–12)
POTASSIUM SERPL-SCNC: 4 MMOL/L (ref 3.5–5.2)
RBC # BLD AUTO: 4.11 10*6/MM3 (ref 4.14–5.8)
REF LAB TEST METHOD: NORMAL
SODIUM SERPL-SCNC: 133 MMOL/L (ref 136–145)
VARIANT LYMPHS NFR BLD MANUAL: 31 % (ref 19.6–45.3)
WBC MORPH BLD: NORMAL
WBC NRBC COR # BLD: 8.08 10*3/MM3 (ref 3.4–10.8)

## 2022-12-12 PROCEDURE — 85025 COMPLETE CBC W/AUTO DIFF WBC: CPT | Performed by: INTERNAL MEDICINE

## 2022-12-12 PROCEDURE — 97110 THERAPEUTIC EXERCISES: CPT

## 2022-12-12 PROCEDURE — 25010000002 VANCOMYCIN 10 G RECONSTITUTED SOLUTION

## 2022-12-12 PROCEDURE — 25010000002 ERTAPENEM PER 500 MG: Performed by: INTERNAL MEDICINE

## 2022-12-12 PROCEDURE — 84100 ASSAY OF PHOSPHORUS: CPT | Performed by: INTERNAL MEDICINE

## 2022-12-12 PROCEDURE — 83735 ASSAY OF MAGNESIUM: CPT | Performed by: INTERNAL MEDICINE

## 2022-12-12 PROCEDURE — 99232 SBSQ HOSP IP/OBS MODERATE 35: CPT | Performed by: INTERNAL MEDICINE

## 2022-12-12 PROCEDURE — 85007 BL SMEAR W/DIFF WBC COUNT: CPT | Performed by: INTERNAL MEDICINE

## 2022-12-12 PROCEDURE — 71045 X-RAY EXAM CHEST 1 VIEW: CPT

## 2022-12-12 PROCEDURE — 80048 BASIC METABOLIC PNL TOTAL CA: CPT | Performed by: INTERNAL MEDICINE

## 2022-12-12 RX ORDER — VANCOMYCIN HYDROCHLORIDE 1 G/200ML
1000 INJECTION, SOLUTION INTRAVENOUS EVERY 12 HOURS
Status: DISCONTINUED | OUTPATIENT
Start: 2022-12-13 | End: 2022-12-13

## 2022-12-12 RX ORDER — FLUCONAZOLE 200 MG/1
400 TABLET ORAL EVERY 24 HOURS
Status: DISCONTINUED | OUTPATIENT
Start: 2022-12-12 | End: 2022-12-13 | Stop reason: HOSPADM

## 2022-12-12 RX ADMIN — LISINOPRIL 40 MG: 40 TABLET ORAL at 08:22

## 2022-12-12 RX ADMIN — ATORVASTATIN CALCIUM 20 MG: 20 TABLET, FILM COATED ORAL at 08:22

## 2022-12-12 RX ADMIN — FINASTERIDE 5 MG: 5 TABLET, FILM COATED ORAL at 08:22

## 2022-12-12 RX ADMIN — METRONIDAZOLE 500 MG: 500 TABLET ORAL at 13:40

## 2022-12-12 RX ADMIN — VANCOMYCIN HYDROCHLORIDE 1250 MG: 10 INJECTION, POWDER, LYOPHILIZED, FOR SOLUTION INTRAVENOUS at 23:31

## 2022-12-12 RX ADMIN — METRONIDAZOLE 500 MG: 500 TABLET ORAL at 05:17

## 2022-12-12 RX ADMIN — ERTAPENEM SODIUM 1 G: 1 INJECTION, POWDER, LYOPHILIZED, FOR SOLUTION INTRAMUSCULAR; INTRAVENOUS at 22:54

## 2022-12-12 RX ADMIN — Medication 10 ML: at 22:54

## 2022-12-12 RX ADMIN — ACETAMINOPHEN 650 MG: 325 TABLET, FILM COATED ORAL at 13:40

## 2022-12-12 RX ADMIN — METOPROLOL SUCCINATE 100 MG: 100 TABLET, EXTENDED RELEASE ORAL at 08:22

## 2022-12-12 RX ADMIN — FLUCONAZOLE 400 MG: 200 TABLET ORAL at 22:54

## 2022-12-12 NOTE — PLAN OF CARE
Goal Outcome Evaluation:  Plan of Care Reviewed With: patient, spouse        Progress: improving  Outcome Evaluation: Pt. able to tolerated 10 reps each UE/LE and balance TE, but rest periods needed between each due to pt. quickly fatigued.  Pt. with good balance with transfers and mvmt in room with wx support.  BADL tasks deferred due to wife had already assisted pt. with all this am.

## 2022-12-12 NOTE — PROGRESS NOTES
[facility] Medicine Services  PROGRESS NOTE    Patient Name: Rhett Isaacs  : 1950  MRN: [id number]    Date of Admission: 2022  Primary Care Physician: Elieser Delvalle MD    Subjective   Subjective     CC:  S/p chest tube    HPI:  Resting in bed in no acute distress and overall comfortable.  Does not have any specific complaint.  No fever or chills.    ROS:  Gen- No fevers, chills  CV- No chest pain, palpitations  Resp- No cough, dyspnea  GI- No N/V/D, abd pain        Objective   Objective     Vital Signs:   Temp:  [96 °F (35.6 °C)-97.2 °F (36.2 °C)] 97.2 °F (36.2 °C)  Heart Rate:  [85-97] 88  Resp:  [15-18] 16  BP: (102-107)/(58-73) 106/71     Physical Exam:  Constitutional: No acute distress, looks comfortable  HENT: NCAT, mucous membranes moist  Respiratory: Clear to auscultation bilaterally, respiratory effort normal, left CT in place.   Cardiovascular: RRR, no murmurs, rubs, or gallops  Gastrointestinal: Positive bowel sounds, soft, nontender, nondistended  Musculoskeletal: No bilateral ankle edema  Psychiatric: Appropriate affect, cooperative  Neurologic: Awake, alert, oriented x3, no focality appreciated, speech clear  Skin: No rashes    Results Reviewed:  LAB RESULTS:      Lab 22  0850 22  0456 12/10/22  0641 22  0523 22  0722   WBC 8.08 8.85 8.36 9.01 9.52   HEMOGLOBIN 10.0* 9.1* 8.6* 8.4* 9.4*   HEMATOCRIT 33.4* 29.7* 27.5* 27.3* 29.7*   PLATELETS 453* 406 386 367 390   NEUTROS ABS 4.77 5.33 4.75 5.40 6.38   IMMATURE GRANS (ABS)  --  0.21* 0.20* 0.27* 0.18*   LYMPHS ABS  --  2.12 2.22 2.00 1.75   MONOS ABS  --  1.05* 1.03* 1.21* 1.13*   EOS ABS 0.08 0.12 0.14 0.12 0.06   MCV 81.3 79.6 79.3 79.1 79.0   SED RATE  --   --   --   --  87*   CRP  --   --   --   --  10.34*   PROTIME  --   --   --   --  15.3*         Lab 22  0850 22  0456 12/10/22  0641 22  0523 22  0722   SODIUM 133* 131* 131* 128* 130*    POTASSIUM 4.0 4.1 3.9 4.4 4.0   CHLORIDE 97* 96* 94* 94* 93*   CO2 27.0 26.0 29.0 27.0 25.0   ANION GAP 9.0 9.0 8.0 7.0 12.0   BUN 11 13 8 7* 10   CREATININE 0.63* 0.52* 0.61* 0.74* 0.55*   EGFR 100.4 106.4 101.4 95.7 104.6   GLUCOSE 142* 110* 137* 123* 131*   CALCIUM 9.6 9.0 9.2 9.1 9.4   MAGNESIUM 2.0  --   --   --   --    PHOSPHORUS 3.0  --   --   --   --    TSH  --   --   --   --  1.410         Lab 12/09/22  0523   TOTAL PROTEIN 7.3   ALBUMIN 2.60*   GLOBULIN 4.7   ALT (SGPT) 23   AST (SGOT) 17   BILIRUBIN 0.4   ALK PHOS 96         Lab 12/08/22  0722   PROTIME 15.3*   INR 1.22*             Lab 12/08/22  0722   IRON 19*   IRON SATURATION 7*   TIBC 259*   TRANSFERRIN 174*   FERRITIN 869.20*         Brief Urine Lab Results     None          Microbiology Results Abnormal     Procedure Component Value - Date/Time    AFB Culture - Body Fluid, Pleural Cavity [[id number]] Collected: 12/08/22 1143    Lab Status: Preliminary result Specimen: Body Fluid from Pleural Cavity Updated: 12/10/22 1134     AFB Stain No acid fast bacilli seen on concentrated smear    MRSA Screen, PCR (Inpatient) - Swab, Nares [[id number]]  (Normal) Collected: 12/09/22 1209    Lab Status: Final result Specimen: Swab from Nares Updated: 12/09/22 1424     MRSA PCR Negative    Narrative:      The negative predictive value of this diagnostic test is high and should only be used to consider de-escalating anti-MRSA therapy. A positive result may indicate colonization with MRSA and must be correlated clinically.  MRSA Negative          CT Chest Without Contrast Diagnostic    Result Date: 12/12/2022  DATE OF EXAM: 12/11/2022 10:41 PM  PROCEDURE: CT CHEST WO CONTRAST DIAGNOSTIC-  INDICATIONS: Empyema  COMPARISON: 12/08/2022 chest CT scan, 12/11/2022 chest radiograph.  TECHNIQUE: Routine transaxial slices were obtained through the chest without the administration of intravenous contrast. Reconstructed coronal and sagittal images were also obtained. Automated  exposure control and iterative construction methods were used.  The radiation dose reduction device was turned on for each scan per the ALARA (As Low as Reasonably Achievable) protocol.  FINDINGS: Diffuse left chest subcutaneous emphysema is again seen as on concurrent chest radiograph. Small bore drain is seen in the residual left-sided pleural collection which now appears effectively drained, with residual pleural thickening and some air but no evidence of residual drainable fluid.  The anterior collection contains air and a small amount of fluid, maximal diameter of the residual anterior pleural fluid collection approximately 3.4 x 1.0 x 3.3 cm. No significant pleural fluid collection is seen elsewhere. No inflammatory changes are seen of the chest wall itself. Mediastinal window images show only shotty nodes, and no significant pericardial or pleural effusion. Images of the lungs show moderate residual subpleural scarring/atelectasis of the lingula and left lower lobe, but markedly improved from the 12/08/2022 predrainage chest CT scan. No significant right lung disease is identified.  Included images of the upper abdomen show mild fatty liver change. Spleen is not enlarged. No significant abnormalities are seen of the pancreatic tail adrenal glands, or left upper renal pole. There is a 2 mm right upper renal pole calculus. Bony structures appear to be intact.       Impression: 1. Left pleural drain remains in place in the posterior pleural space. No significant residual undrained collection here. 2. Small residual anterior pleural fluid collection, with maximal dimensions of 3.4 x 1.0 x 3.3 cm. 3. Residual left lung scarring/atelectasis, although markedly improved from 12/08/2022, prior to drainage. No new chest disease is seen elsewhere. 4. Moderately extensive left sided subcutaneous emphysema as on concurrent chest radiograph.      XR Chest 1 View    Result Date: 12/12/2022  DATE OF EXAM: 12/12/2022 2:50 AM   PROCEDURE: XR CHEST 1 VW-  INDICATIONS: Chest tube  COMPARISON: 12/11/2022  TECHNIQUE: Single radiographic AP view of the chest was obtained.  FINDINGS: There remains a pigtail catheter in left lower chest. There is subcutaneous air along the left lateral chest wall. This extends into the neck. There remains airspace disease in left lower chest. The right lung seems clear. A pneumothorax is not definitely confirmed.      Impression: 1.  Persistent airspace disease within the left lower chest. 2.  Subcutaneous air along the left lateral chest wall extending into the neck.  This report was finalized on 12/12/2022 7:18 AM by Remington Sabillon MD.      XR Chest 1 View    Result Date: 12/11/2022  DATE OF EXAM: 12/11/2022 2:25 AM  PROCEDURE: XR CHEST 1 VW-  INDICATIONS: Chest tube  COMPARISON: One day prior  TECHNIQUE: Single radiographic AP view of the chest was obtained.  FINDINGS: Small bore left-sided chest tube projects unchanged. Dense opacity in the left midlung field is similar to comparison. There is no significant effusion or distinct pneumothorax. The right lung remains clear. Unchanged heart and mediastinal contours.      Impression: Small bore left-sided chest tube projects unchanged. Dense opacity in the left midlung field is similar to comparison. There is no significant effusion or distinct pneumothorax. The right lung remains clear. Unchanged heart and mediastinal contours.  This report was finalized on 12/11/2022 8:17 AM by Rhett Raygoza.            I have reviewed the medications:  Scheduled Meds:atorvastatin, 20 mg, Oral, Daily  cefTRIAXone, 2 g, Intravenous, Q24H  finasteride, 5 mg, Oral, Daily  lisinopril, 40 mg, Oral, Q24H  metoprolol succinate XL, 100 mg, Oral, Daily  metroNIDAZOLE, 500 mg, Oral, Q8H  sodium chloride, 10 mL, Intravenous, Q12H      Continuous Infusions:   PRN Meds:.•  acetaminophen **OR** acetaminophen **OR** acetaminophen  •  ondansetron **OR** ondansetron  •  sodium chloride  •   sodium chloride  •  traMADol    Assessment & Plan   Assessment & Plan     Active Hospital Problems    Diagnosis  POA   • **Bronchial pneumonia [J18.0]  Yes   • Severe malnutrition (HCC) [E43]  Yes   • Empyema lung (HCC) [J86.9]  Yes   • Chest tube in place [Z96.89]  Yes   • Weight loss [R63.4]  Yes   • Benign hypertrophy of prostate [N40.0]  Yes   • Mixed hyperlipidemia [E78.2]  Yes   • Essential hypertension, benign [I10]  Yes      Resolved Hospital Problems   No resolved problems to display.        Brief Hospital Course to date:  Rhett Isaacs is a 72 y.o. male nonsmoker w/ PMH of HTN, HLD, BPH and severe GERD symptoms occasional nocturnal vomiting/reflux, who has been evaluated since September 2022 for LT lung lesion now s/p needle aspiration with concern for abscess and subsequent chest tube placement     Assessment/Plan     LT lung lesion  Loculated LT pleural effusion s/p aspiration and chest tube  Lipoid pneumonia?  -s/p bronch w/ Bx 11/9/22 - path c/w lipoid pna, cx data negative  -chest tube placed by IR 12/8/22 w/ milky white drainage  -cultures sent, thus far gram positive cocci   -SLP consulted and have signed off  -ID is following and managing the antibiotic treatment  -Pulmonology following and they plan to pull the chest tube out as the drainage has stopped.  However, there is a loculated compartment that may require drainage by IR.     Severe GERD w/ persistent symptoms  Intolerance to PPIs  Abnormal weight loss  New microcytic anemia  -approx 40lb weight loss over 3 months  -needs EGD to r/o GI malignancy, pt/wife prefer opt f/u w/ Dr. Torres  - TSH wnl, CRP elevated, iron profile c/w AOCD     Mild hyponatremia of unclear significance  -poor oral intake for weeks/months  -hold HCTZ  -- Na stable, will monitor for now       HTN  HLD  BPH  -home atorvastatin, finasteride, lisinopril, metoprolol xl       Expected Discharge Location and Transportation: home  Expected Discharge Date: 12/13/22    DVT  prophylaxis:  Mechanical DVT prophylaxis orders are present.     AM-PAC 6 Clicks Score (PT): 21 (12/11/22 1929)    CODE STATUS:   Code Status and Medical Interventions:   Ordered at: 12/08/22 1254     Code Status (Patient has no pulse and is not breathing):    CPR (Attempt to Resuscitate)     Medical Interventions (Patient has pulse or is breathing):    Full Support       Shamar Muñoz MD  12/12/22

## 2022-12-12 NOTE — THERAPY TREATMENT NOTE
Patient Name: Rhett Isaacs  : 1950    MRN: [id number]                              Today's Date: 2022       Admit Date: 2022    Visit Dx: No diagnosis found.  Patient Active Problem List   Diagnosis   • Benign hypertrophy of prostate   • Mixed hyperlipidemia   • Essential hypertension, benign   • Lung mass   • Bronchial pneumonia   • Chest tube in place   • Weight loss   • Empyema lung (HCC)   • Severe malnutrition (HCC)     Past Medical History:   Diagnosis Date   • Acute bronchitis    • Acute prostatitis    • Acute serous otitis media    • Benign prostatic hyperplasia    • Cancer (HCC) 2015    Basil cell carcinoma   • Cataract     still have them- bilat   • Colon polyp 10/2017   • Diabetes mellitus ([facility]) 1967    doesnt checks sugar   • Diverticulosis 2004    Found in first colonoscopy   • Dysuria    • Fatigue    • GERD (gastroesophageal reflux disease)    • Hyperlipidemia    • Hypertension    • Impacted cerumen    • Prostatitis    • PVC (premature ventricular contraction)    • Wears glasses     readers     Past Surgical History:   Procedure Laterality Date   • ADENOIDECTOMY     • BASAL CELL CARCINOMA EXCISION     • BRONCHOSCOPY N/A 2022    Procedure: BRONCHOSCOPY WITH ENDOBRONCHIAL ULTRASOUND WITH FLUOROSCOPY;  Surgeon: Jeremias Hughes MD;  Location: [facility] ENDOSCOPY;  Service: Pulmonary;  Laterality: N/A;  EBUS balloon removed and intact   • COLONOSCOPY  10/23/2017   • EYE SURGERY  2015    Basil cell carcenoma   • TONSILLECTOMY     • WISDOM TOOTH EXTRACTION        General Information     Row Name 22 1147          OT Time and Intention    Document Type therapy note (daily note)  -LEANDRO     Mode of Treatment individual therapy;occupational therapy  -LEANDRO     Row Name 22 1147          General Information    Patient Profile Reviewed yes  -LEANDRO     Existing Precautions/Restrictions fall;other (see comments)  chest tube on suction  -LEANDRO     Barriers to  Rehab none identified  -     Row Name 12/12/22 1147          Cognition    Orientation Status (Cognition) oriented x 4  -     Row Name 12/12/22 1147          Safety Issues, Functional Mobility    Impairments Affecting Function (Mobility) balance;endurance/activity tolerance;strength  -           User Key  (r) = Recorded By, (t) = Taken By, (c) = Cosigned By    Initials Name Provider Type    Юлия Lambert, OT Occupational Therapist                 Mobility/ADL's     Row Name 12/12/22 1148          Bed Mobility    Supine-Sit [place] (Bed Mobility) standby assist  -     Assistive Device (Bed Mobility) bed rails;head of bed elevated  -     Row Name 12/12/22 1148          Transfers    Transfers sit-stand transfer;stand-sit transfer  -     Comment, (Transfers) pt. stood using LE power only  -     Row Name 12/12/22 1148          Sit-Stand Transfer    Sit-Stand [place] (Transfers) contact guard  -     Assistive Device (Sit-Stand Transfers) walker, front-wheeled  -     Row Name 12/12/22 1148          Stand-Sit Transfer    Stand-Sit [place] (Transfers) contact guard;verbal cues  -     Assistive Device (Stand-Sit Transfers) walker, front-wheeled  -     Comment, (Stand-Sit Transfer) cues to reach back  -     Row Name 12/12/22 1148          Functional Mobility    Functional Mobility- Ind. Level supervision required  -     Functional Mobility- Device walker, front-wheeled  -LEANDRO     Functional Mobility-Distance (Feet) --  28 + 28  -     Functional Mobility- Safety Issues step length decreased  -     Row Name 12/12/22 1148          Activities of Daily Living    BADL Assessment/Intervention --  per pt. and spouse, pt. already in bathroom with spouse assist and bathed and groomed with fresh clothing on  -           User Key  (r) = Recorded By, (t) = Taken By, (c) = Cosigned By    Initials Name Provider Type    Юлия Lambert, OT Occupational Therapist                Obj/Interventions     [facility] Name 12/12/22 1150          Shoulder (Therapeutic Exercise)    Shoulder (Therapeutic Exercise) AROM (active range of motion)  -     Shoulder AROM (Therapeutic Exercise) bilateral;flexion;extension;aBduction;aDduction;horizontal aBduction/aDduction;10 repetitions;sitting  -[facility] Name 12/12/22 1150          Elbow/Forearm (Therapeutic Exercise)    Elbow/Forearm (Therapeutic Exercise) isometric exercise  -     Elbow/Forearm Isometrics (Therapeutic Exercise) bilateral;flexion;sitting;10 repetitions  -[facility] Name 12/12/22 1150          Motor Skills    Therapeutic Exercise shoulder;elbow/forearm  pt. also completed 10 reps knee and hip F/E, short rest periods between each, with abd pt. completed 4 then rested and then 6 more reps  -[facility] Name 12/12/22 1150          Balance    Static Sitting Balance independent  -     Dynamic Sitting Balance independent  -LEANDRO     Position, Sitting Balance unsupported;sitting edge of bed  -LEANDRO     Static Standing Balance supervision  -LEANDRO     Dynamic Standing Balance contact guard  -LEANDRO     Position/Device Used, Standing Balance walker, rolling  -LEANDRO     Balance Interventions sit to stand  -LEANDRO     Comment, Balance up on toes 10 reps and high step 10 reps  -LEANDRO           User Key  (r) = Recorded By, (t) = Taken By, (c) = Cosigned By    Initials Name Provider Type    Юлия Lambert, OT Occupational Therapist               Goals/Plan     [facility] Name 12/12/22 1155          Transfer Goal 1 (OT)    Progress/Outcome (Transfer Goal 1, OT) goal partially met  -[facility] Name 12/12/22 1155          Toileting Goal 1 (OT)    Progress/Outcome (Toileting Goal 1, OT) goal ongoing  -LEANDRO     Row Name 12/12/22 1155          Grooming Goal 1 (OT)    Progress/Outcome (Grooming Goal 1, OT) goal ongoing  -           User Key  (r) = Recorded By, (t) = Taken By, (c) = Cosigned By    Initials Name Provider Type    Юлия Lambert, OT Occupational Therapist                Clinical Impression     Row Name 12/12/22 1152          Pain Assessment    Pretreatment Pain Rating 0/10 - no pain  -LEANDRO     Posttreatment Pain Rating 0/10 - no pain  -LEANDRO     Row Name 12/12/22 1152          Plan of Care Review    Plan of Care Reviewed With patient;spouse  -LEANDRO     Progress improving  -     Outcome Evaluation Pt. able to tolerated 10 reps each UE/LE and balance TE, but rest periods needed between each due to pt. quickly fatigued.  Pt. with good balance with transfers and mvmt in room with wx support.  BADL tasks deferred due to wife had already assisted pt. with all this am.  -LEANDRO     Row Name 12/12/22 1152          Therapy Assessment/Plan (OT)    Therapy Frequency (OT) daily  -LEANDRO     Row Name 12/12/22 1152          Therapy Plan Review/Discharge Plan (OT)    Anticipated Discharge Disposition (OT) home with assist  -LEANDRO     Row Name 12/12/22 1152          Vital Signs    Pre Systolic BP Rehab 103  -LEANDRO     Pre Treatment Diastolic BP 65  -LEANDRO     Post Systolic BP Rehab 112  -LEANDRO     Post Treatment Diastolic BP 67  -LEANDRO     Pretreatment Heart Rate (beats/min) 95  -LEANDRO     Posttreatment Heart Rate (beats/min) 96  -LEANDRO     Pre SpO2 (%) 94  -LEANDRO     O2 Delivery Pre Treatment room air  -LEANDRO     O2 Delivery Intra Treatment room air  -LEANDRO     Post SpO2 (%) 97  -LEANDRO     O2 Delivery Post Treatment room air  -LEANDRO     Pre Patient Position Supine  -LEANDRO     Intra Patient Position Standing  -LEANDRO     Post Patient Position Sitting  -LEANDRO     Row Name 12/12/22 1152          Positioning and Restraints    Pre-Treatment Position in bed  -LEANDRO     Post Treatment Position chair  -LEANDRO     In Chair reclined;call light within reach;encouraged to call for assist;exit alarm on;with family/caregiver;legs elevated;waffle cushion  -           User Key  (r) = Recorded By, (t) = Taken By, (c) = Cosigned By    Initials Name Provider Type    Юлия Lambert, OT Occupational Therapist               Outcome Measures     Row Name 12/12/22 1157           How much help from another is currently needed...    Putting on and taking off regular lower body clothing? 3  -LEANDRO     Bathing (including washing, rinsing, and drying) 3  -LEANDRO     Toileting (which includes using toilet bed pan or urinal) 3  -ELANDRO     Putting on and taking off regular upper body clothing 3  -LEANDRO     Taking care of personal grooming (such as brushing teeth) 4  -LEANDRO     Eating meals 4  -LEANDRO     AM-PAC 6 Clicks Score (OT) 20  -LEANDRO     Row Name 12/12/22 1155          Functional Assessment    Outcome Measure Options AM-PAC 6 Clicks Daily Activity (OT)  -LEANDRO           User Key  (r) = Recorded By, (t) = Taken By, (c) = Cosigned By    Initials Name Provider Type    Юлия Lambert, OT Occupational Therapist                Occupational Therapy Education     Title: PT OT SLP Therapies (In Progress)     Topic: Occupational Therapy (Done)     Point: ADL training (Done)     Description:   Instruct learner(s) on proper safety adaptation and remediation techniques during self care or transfers.   Instruct in proper use of assistive devices.              Learning Progress Summary           Patient Acceptance, E, VU by LEANDRO at 12/9/2022 1029    Comment: OT POC; fall prevention; DME assessment; discharge planning   Family Acceptance, E, VU by LEANDRO at 12/9/2022 1029    Comment: OT POC; fall prevention; DME assessment; discharge planning                   Point: Home exercise program (Done)     Description:   Instruct learner(s) on appropriate technique for monitoring, assisting and/or progressing therapeutic exercises/activities.              Learning Progress Summary           Patient Acceptance, E,D, VU,NR by [facility] at 12/12/2022 1156    Comment: UE/LE and balance TE   Family Acceptance, E,D, VU,NR by [facility] at 12/12/2022 1156    Comment: UE/LE and balance TE                   Point: Precautions (Done)     Description:   Instruct learner(s) on prescribed precautions during self-care and functional transfers.              Learning  Progress Summary           Patient Acceptance, E, VU by LEANDRO at 12/9/2022 1029    Comment: OT POC; fall prevention; DME assessment; discharge planning   Family Acceptance, E, VU by LEANDRO at 12/9/2022 1029    Comment: OT POC; fall prevention; DME assessment; discharge planning                   Point: Body mechanics (Done)     Description:   Instruct learner(s) on proper positioning and spine alignment during self-care, functional mobility activities and/or exercises.              Learning Progress Summary           Patient Acceptance, E, VU by LEANDRO at 12/9/2022 1029    Comment: OT POC; fall prevention; DME assessment; discharge planning   Family Acceptance, E, VU by LEANDRO at 12/9/2022 1029    Comment: OT POC; fall prevention; DME assessment; discharge planning                               User Key     Initials Effective Dates Name Provider Type Discipline    [facility] 06/16/21 -  Юлия Banks, OT Occupational Therapist OT     06/16/21 -  Ness Dexter OT Occupational Therapist OT              OT Recommendation and Plan  Therapy Frequency (OT): daily  Plan of Care Review  Plan of Care Reviewed With: patient, spouse  Progress: improving  Outcome Evaluation: Pt. able to tolerated 10 reps each UE/LE and balance TE, but rest periods needed between each due to pt. quickly fatigued.  Pt. with good balance with transfers and mvmt in room with wx support.  BADL tasks deferred due to wife had already assisted pt. with all this am.     Time Calculation:    Time Calculation- OT     Row Name 12/12/22 1157             Time Calculation- OT    OT Start Time 1126  -LEANDRO      OT Received On 12/12/22  -LEANDRO      OT Goal Re-Cert Due Date 12/19/22  -LEANDRO         Timed Charges    74761 - OT Therapeutic Exercise Minutes 12  -LEANDRO      54004 - OT Therapeutic Activity Minutes 8  -LEANDRO         Total Minutes    Timed Charges Total Minutes 20  -LEANDRO       Total Minutes 20  -LEANDRO            User Key  (r) = Recorded By, (t) = Taken By, (c) = Cosigned By    Initials  Name Provider Type    Юлия Lambert, OT Occupational Therapist              Therapy Charges for Today     Code Description Service Date Service Provider Modifiers Qty    16721074163 HC OT THER PROC EA 15 MIN 12/12/2022 Юлия Banks, JENNY GO 1               Юлия Banks OT  12/12/2022

## 2022-12-12 NOTE — PLAN OF CARE
Goal Outcome Evaluation:         Patient had a CT.  He is on room air.  Her has had several loose bowel movements.  He is running SR/ST on monitor.

## 2022-12-12 NOTE — PROGRESS NOTES
INFECTIOUS DISEASE Progress Note    Rhett Isaacs  1950  [id number]      Admission Date: 12/8/2022      Requesting Provider: Jeremias Hughes MD  Evaluating Physician: George Underwood MD    Reason for Consultation: empyema    History of present illness:    12/9/22: Patient is a 72 y.o. male with h/o BPH, HTN, and HLD who was admitted to [facility] ED from interventional radiology after chest tube placement.  The patient had an aspiration event after vomiting in his sleep in September 2022.  He was on 2 round of antibiotics and improved.  A CT scan on 10/18 showed a 6.3 cm mass along the left anterior pleural margin of DIEGO with loculated pleural effusion.  He was referred to pulmonology and underwent a bronchoscopy by Dr. Hughes on 11/9.  The pathology showed lipoid pneumonia.  All cultures including AFB were negative.  He underwent a CT-guided aspiration of left lung mass on 12/8 which showed that it was an abscess.  He also had a chest tube placed in the left empyema.  Pleural fluid had 89% neutrophils with cell count pending.  Cultures are negative to date and pending.  He is afebrile.  Pertinent labs were CRP 10.34, ESR 87, Ferritin 869, WBC 9500 with 67% neutrophils, and creatinine 0.55. A CXR showed a new small bore right-sided chest tube in left posterior pleural space with decreased left-sided airspace disease. He is currently on Zosyn and Linezolid.  ID was asked to evaluate and manage his antibiotic therapy. The anterior fluid collection was drained with aspiration by interventional radiology and a chest tube was placed in the posterior collection.  He is now draining thick purulent material from the drainage catheter.  Pulmonary medicine has placed alteplase and Pulmozyme to assist with drainage. Gram stain of his pleural fluid reveals moderate white blood cells with moderate gram-positive cocci in pairs and clusters.  His nasal MRSA PCR is negative.    On examination, the patient was very  uncomfortable as Alteplase and Dornase alpha was injected into the pleural cavity and drain clamped.  His wife indicates that he has progressively worsened over the last several weeks with anorexia, weight loss, malaise, and fatigue.  He has lost over 30 pounds.      12/10/22: He has remained afebrile over the last 24 hours. His white blood cell count today is 8.4. He continues to complain of malaise and fatigue.  He has anorexia.  He complains of a metallic taste from the metronidazole. His empyema cultures are growing gram-positive cocci.    12/11/2022: His empyema cultures have grown Streptococcus intermedius.  The isolate is sensitive to vancomycin.  E-strips are being utilized to obtain additional sensitivity testing-I discussed this with the microbiology lab today.  He now feels better.  He has an improved appetite and increased nutritional intake.  He has remained afebrile.  His white blood cell count is 8.9.  He has decreased chest tube output.  His tPA/dornase has been discontinued.  Pulmonary medicine has ordered a follow-up chest CT scan.  His chest x-ray today reveals a dense residual opacity in the left midlung field which is similar to previous x-rays.    12/12/22: He remains afebrile. Chest CT scan today reveals significant improvement in the posterior empyema.  He denies increased cough and sputum production.  His appetite is improved.  He denies nausea, vomiting, and diarrhea. His pleural fluid fungal stain is now reported as positive for yeast. His Streptococcus intermedius is now reported to be resistant to ceftriaxone and penicillin.     Past Medical History:   Diagnosis Date   • Acute bronchitis    • Acute prostatitis    • Acute serous otitis media    • Benign prostatic hyperplasia    • Cancer ([facility]) 09/2015    Basil cell carcinoma   • Cataract     still have them- bilat   • Colon polyp 10/2017   • Diabetes mellitus ([facility]) 06/1967    doesnt checks sugar   • Diverticulosis 2004    Found in first  colonoscopy   • Dysuria    • Fatigue    • GERD (gastroesophageal reflux disease) 2016   • Hyperlipidemia    • Hypertension    • Impacted cerumen    • Prostatitis    • PVC (premature ventricular contraction)    • Wears glasses     readers       Past Surgical History:   Procedure Laterality Date   • ADENOIDECTOMY  1956   • BASAL CELL CARCINOMA EXCISION  2015   • BRONCHOSCOPY N/A 11/9/2022    Procedure: BRONCHOSCOPY WITH ENDOBRONCHIAL ULTRASOUND WITH FLUOROSCOPY;  Surgeon: Jeremias Hughes MD;  Location: [facility] ENDOSCOPY;  Service: Pulmonary;  Laterality: N/A;  EBUS balloon removed and intact   • COLONOSCOPY  10/23/2017   • EYE SURGERY  September 2015    Basil cell carcenoma   • TONSILLECTOMY     • WISDOM TOOTH EXTRACTION         Family History   Problem Relation Age of Onset   • Alcohol abuse Father    • Hyperlipidemia Mother    • Hypertension Brother        Social History     Socioeconomic History   • Marital status:    Tobacco Use   • Smoking status: Never   • Smokeless tobacco: Never   Vaping Use   • Vaping Use: Never used   Substance and Sexual Activity   • Alcohol use: No   • Drug use: No   • Sexual activity: Never       Allergies   Allergen Reactions   • Penicillins Diarrhea   • Cardizem [Diltiazem Hcl] Rash   • Sulfa Antibiotics Rash         Medication:    Current Facility-Administered Medications:   •  acetaminophen (TYLENOL) tablet 650 mg, 650 mg, Oral, Q4H PRN, 650 mg at 12/11/22 1726 **OR** acetaminophen (TYLENOL) 160 MG/5ML solution 650 mg, 650 mg, Oral, Q4H PRN **OR** acetaminophen (TYLENOL) suppository 650 mg, 650 mg, Rectal, Q4H PRN, Kaleb Chapman DO  •  atorvastatin (LIPITOR) tablet 20 mg, 20 mg, Oral, Daily, Kaleb Chapman DO, 20 mg at 12/11/22 0853  •  cefTRIAXone (ROCEPHIN) 2 g/100 mL 0.9% NS IVPB (MBP), 2 g, Intravenous, Q24H, George Underwood MD, 2 g at 12/11/22 2142  •  finasteride (PROSCAR) tablet 5 mg, 5 mg, Oral, Daily, Kaleb Chapman DO, 5 mg at 12/11/22  0853  •  lisinopril (PRINIVIL,ZESTRIL) tablet 40 mg, 40 mg, Oral, Q24H, Kaleb Chapman DO, 40 mg at 22 0853  •  metoprolol succinate XL (TOPROL-XL) 24 hr tablet 100 mg, 100 mg, Oral, Daily, Kaleb Chapman DO, 100 mg at 22 0853  •  metroNIDAZOLE (FLAGYL) tablet 500 mg, 500 mg, Oral, Q8H, George Underwood MD, 500 mg at 22 0517  •  ondansetron (ZOFRAN) tablet 4 mg, 4 mg, Oral, Q6H PRN **OR** ondansetron (ZOFRAN) injection 4 mg, 4 mg, Intravenous, Q6H PRN, Kaleb Chapman DO  •  sodium chloride 0.9 % flush 10 mL, 10 mL, Intravenous, Q12H, Kaleb Chapman DO, 10 mL at 22  •  sodium chloride 0.9 % flush 10 mL, 10 mL, Intravenous, PRN, Kaleb Chapman DO  •  sodium chloride 0.9 % infusion 40 mL, 40 mL, Intravenous, PRN, Kaleb Chapman DO  •  traMADol (ULTRAM) tablet 50 mg, 50 mg, Oral, Q6H PRN, Kaleb Chapman DO, 50 mg at 12/10/22 2058    Antibiotics:  Anti-Infectives (From admission, onward)    Ordered     Dose/Rate Route Frequency Start Stop    22  metroNIDAZOLE (FLAGYL) tablet 500 mg        Ordering Provider: George Underwood MD    500 mg Oral Every 8 Hours Scheduled 22  cefTRIAXone (ROCEPHIN) 2 g/100 mL 0.9% NS IVPB (MBP)        Ordering Provider: George Underwood MD    2 g  over 30 Minutes Intravenous Every 24 Hours 22 2100 22 1335  piperacillin-tazobactam (ZOSYN) 4.5 g in iso-osmotic dextrose 100 mL IVPB (premix)        Ordering Provider: Jeremias Hughes MD    4.5 g  over 30 Minutes Intravenous Once 22 1430 22 1606            Review of Systems:  See HPI    Physical Exam:   Vital Signs  Temp (24hrs), Av.8 °F (36 °C), Min:96 °F (35.6 °C), Max:97.8 °F (36.6 °C)    Temp  Min: 96 °F (35.6 °C)  Max: 97.8 °F (36.6 °C)  BP  Min: 102/58  Max: 118/75  Pulse  Min: 85  Max: 109  Resp  Min: 15  Max: 18  SpO2  Min: 96 %  Max: 96 %    GENERAL: Awake  and alert, in moderate distress. Frail appearing.   HEENT: Normocephalic, atraumatic.  PERRL. EOMI. No conjunctival injection. No icterus. Oropharynx clear without evidence of thrush or exudate.  NECK: Supple  HEART: RRR; No murmur, rubs, gallops.   LUNGS: diminished in left lung field, scattered rales in right lung field without wheezing. Normal respiratory effort. Nonlabored. Chest tube in place in the left posterior chest. Copious thick purulent material has been drained. He has minimal drainage over the last day.  ABDOMEN: Soft, nontender, nondistended.  No rebound or guarding. NO mass or HSM.  EXT:  No cyanosis, clubbing or edema. No cord.  :  Without Rivera catheter.  MSK: No joint effusions or erythema  SKIN: Warm and dry without cutaneous eruptions on Inspection/palpation.    NEURO: Oriented to PPT.  Motor 5/5 strength  PSYCHIATRIC: Normal insight and judgment. Cooperative with PE    Laboratory Data    Results from last 7 days   Lab Units 12/11/22 0456 12/10/22  0641 12/09/22  0523   WBC 10*3/mm3 8.85 8.36 9.01   HEMOGLOBIN g/dL 9.1* 8.6* 8.4*   HEMATOCRIT % 29.7* 27.5* 27.3*   PLATELETS 10*3/mm3 406 386 367     Results from last 7 days   Lab Units 12/11/22  0456   SODIUM mmol/L 131*   POTASSIUM mmol/L 4.1   CHLORIDE mmol/L 96*   CO2 mmol/L 26.0   BUN mg/dL 13   CREATININE mg/dL 0.52*   GLUCOSE mg/dL 110*   CALCIUM mg/dL 9.0     Results from last 7 days   Lab Units 12/09/22  0523   ALK PHOS U/L 96   BILIRUBIN mg/dL 0.4   ALT (SGPT) U/L 23   AST (SGOT) U/L 17     Results from last 7 days   Lab Units 12/08/22  0722   SED RATE mm/hr 87*     Results from last 7 days   Lab Units 12/08/22  0722   CRP mg/dL 10.34*                 Estimated Creatinine Clearance: 116.1 mL/min (A) (by C-G formula based on SCr of 0.52 mg/dL (L)).      Microbiology:  Microbiology Results (last 10 days)     Procedure Component Value - Date/Time    MRSA Screen, PCR (Inpatient) - Swab, Nares [[id number]]  (Normal) Collected: 12/09/22 2073     Lab Status: Final result Specimen: Swab from Nares Updated: 12/09/22 1424     MRSA PCR Negative    Narrative:      The negative predictive value of this diagnostic test is high and should only be used to consider de-escalating anti-MRSA therapy. A positive result may indicate colonization with MRSA and must be correlated clinically.  MRSA Negative    Body Fluid Culture - Body Fluid, Lung, Left Lower Lobe [[id number]]  (Abnormal) Collected: 12/08/22 1143    Lab Status: Preliminary result Specimen: Body Fluid from Lung, Left Lower Lobe Updated: 12/11/22 0936     Body Fluid Culture Moderate growth (3+) Streptococcus intermedius     Gram Stain Many (4+) WBCs seen      No organisms seen    Narrative:      Refer to previous wound culture collected on 12/08/2022 1138 for MICs    Anaerobic Culture - Body Fluid, Lung, Left Lower Lobe [[id number]]  (Normal) Collected: 12/08/22 1143    Lab Status: Preliminary result Specimen: Body Fluid from Lung, Left Lower Lobe Updated: 12/11/22 0820     Anaerobic Culture No anaerobes isolated at 3 days    AFB Culture - Body Fluid, Pleural Cavity [[id number]] Collected: 12/08/22 1143    Lab Status: Preliminary result Specimen: Body Fluid from Pleural Cavity Updated: 12/10/22 1134     AFB Stain No acid fast bacilli seen on concentrated smear    Body Fluid Culture - Body Fluid, Pleural Cavity [[id number]]  (Abnormal)  (Susceptibility) Collected: 12/08/22 1138    Lab Status: Preliminary result Specimen: Body Fluid from Pleural Cavity Updated: 12/11/22 0937     Body Fluid Culture Moderate growth (3+) Streptococcus intermedius     Gram Stain Moderate (3+) WBCs seen      Moderate (3+) Gram positive cocci in pairs and clusters    Narrative:      Penicillin and ceftriaxone to follow     Susceptibility      Streptococcus intermedius      FARHEEN (Preliminary)      Vancomycin Susceptible                           Anaerobic Culture - Body Fluid, Lung, Left Lower Lobe [[id number]]  (Normal) Collected:  12/08/22 1138    Lab Status: Preliminary result Specimen: Body Fluid from Lung, Left Lower Lobe Updated: 12/11/22 0818     Anaerobic Culture No anaerobes isolated at 3 days                Radiology:  XR Chest 1 View    Result Date: 12/12/2022  1.  Persistent airspace disease within the left lower chest. 2.  Subcutaneous air along the left lateral chest wall extending into the neck.  This report was finalized on 12/12/2022 7:18 AM by Remington Sabillon MD.      XR Chest 1 View    Result Date: 12/11/2022  Small bore left-sided chest tube projects unchanged. Dense opacity in the left midlung field is similar to comparison. There is no significant effusion or distinct pneumothorax. The right lung remains clear. Unchanged heart and mediastinal contours.  This report was finalized on 12/11/2022 8:17 AM by Rhett Raygoza.      XR Chest 1 View    Result Date: 12/10/2022  Mildly decreased loculated left pleural effusion with improved aeration of the left lung base.  This report was finalized on 12/10/2022 8:53 AM by Mauro Baron.      XR Chest 1 View    Result Date: 12/9/2022  1. New small bore right-sided chest tube likely within the posterior left pleural space. Decreased left-sided airspace disease compared to the prior exam.  This report was finalized on 12/9/2022 7:28 AM by Gio Barney MD.      CT Guided Chest Tube    Result Date: 12/8/2022  Impression:                                                              Successful CT guided aspiration of a left lung mass that turned out to be a lung abscess, likely related to the history of prior aspiration.  Successful CT-guided placement of an 8.5 [place] drainage catheter in the left empyema.  Thank you for the opportunity to assist in the care of your patient.  This report was finalized on 12/8/2022 11:58 AM by Ollie Segura MD.      CT Guided Abscess Drain Lung    Result Date: 12/8/2022  Impression:                                                               Successful CT guided aspiration of a left lung mass that turned out to be a lung abscess, likely related to the history of prior aspiration.  Successful CT-guided placement of an 8.5 [place] drainage catheter in the left empyema.  Thank you for the opportunity to assist in the care of your patient.  This report was finalized on 12/8/2022 11:58 AM by Ollie Segura MD.    His chest CT scan today reveals marked improvement in the left posterior chest fluid collection      Impression:   1. Strep intermedius/?Yeast left empyema-he has 2 loculated areas of empyema in the left hemithorax with the largest area posteriorly. I will add fluconazole therapy. Streptococcus intermedius is now reported to be resistant to ceftriaxone and penicillin.  2. Aspiration pneumonia/lipoid pneumonia  3. Anemia of chronic disease  4. Essential hypertension  5. Benign prostatic hyperplasia  6. Penicillin (diarrhea) and sulfa (rash) allergies.     Streptococcus intermedius is generally sensitive to both ceftriaxone and penicillin.  This isolate is reportedly resistant to both of these antibiotics.  I am unsure if this is accurate information.  I will switch him to vancomycin/ertapenem for the time being.  I may ask for repeat ceftriaxone and penicillin sensitivity testing.  I have also called the microbiology lab and asked them for linezolid, daptomycin, and ertapenem sensitivities.    PLAN/RECOMMENDATIONS:  1.  Discontinue ceftriaxone  2.  Discontinue metronidazole  3.  Invanz 1 g IV daily  4.  Intravenous vancomycin-pharmacy to dose.  5.  Fluconazole 400 mg by mouth daily     I spent over 35 minutes on his complex care today.  I discussed his complex case with Dr. Muñoz today  I discussed his complex situation with Dr. Jeffers earlier today.  He will arrange for removal of the posterior empyema drainage catheter.  He contacted interventional radiology today and was told that aspiration/drainage of the anterior fluid collection was not an  option.        George Underwood MD  12/12/2022  08:17 EST

## 2022-12-12 NOTE — CASE MANAGEMENT/SOCIAL WORK
Continued Stay Note  [facility]     Patient Name: Rhett Isaacs  MRN: [id number]  Today's Date: 12/12/2022    Admit Date: 12/8/2022    Plan: Home   Discharge Plan     Row Name 12/12/22 1112       Plan    Plan Home    Plan Comments Discussed patien tin MDR.  Patient still has CT to suction.  May need decortication.  His plan is to return home at discharge.  Per ID note, he will go to their office daily for outpatient IV antiobiotics.  CM will continue to follow.    Final Discharge Disposition Code 01 - home or self-care               Discharge Codes    No documentation.               Expected Discharge Date and Time     Expected Discharge Date Expected Discharge Time    Dec 16, 2022             Michelle Steward RN

## 2022-12-12 NOTE — PROGRESS NOTES
Pulmonary Hospital Follow-up     Hospital:  LOS: 4 days   Mr. Rhett Isaacs, 72 y.o. male is followed for:   Bronchial pneumonia            History of present illness:     72-year-old male with past medical history of BPH, hypertension and dyslipidemia.  Patient presented to hospital with left lingular pneumonia and initially underwent bronchoscopy without any endobronchial lesions noted.  However patient continued to have symptoms of fatigue, poor appetite and weight loss and has lost about 36 pounds of weight in about 2 months timeframe.  Patient subsequently was referred to IR for CT-guided biopsy.  During that biopsy in the left upper lobe there was greenish-yellow purulent material was suctioned and on CT scan review seems like there was worsening of left lower lobe effusion loculated component as well so pleural drain was placed and patient was admitted to the hospital.  Subsequently patient has required multiple tPA and dornase alpha installations with improvement in pleural effusion.  Repeat CT scan done showed significant improvement in lower lobe drainage without any significant effusion left.  There is subcutaneous emphysema noted chest wall tracking all the way up in the neck area.  Looks like subcutaneous emphysema has been persisting for past couple of days on chest x-ray as well.  No obvious pneumothorax noted currently.    Subjective   Interval History:  No significant output noted from chest tube.  Patient is on room air currently.  States that he has been doing well.  His diarrhea has improved.  Denies any chest pain.                 The patient's past medical, surgical and social history were reviewed and updated in Epic as appropriate.       Objective     Infusions:     Medications:  atorvastatin, 20 mg, Oral, Daily  cefTRIAXone, 2 g, Intravenous, Q24H  finasteride, 5 mg, Oral, Daily  lisinopril, 40 mg, Oral, Q24H  metoprolol succinate XL, 100 mg, Oral, Daily  metroNIDAZOLE, 500 mg, Oral,  Q8H  sodium chloride, 10 mL, Intravenous, Q12H        Vital Sign Min/Max for last 24 hours  Temp  Min: 96 °F (35.6 °C)  Max: 97.8 °F (36.6 °C)   BP  Min: 102/58  Max: 107/72   Pulse  Min: 85  Max: 97   Resp  Min: 15  Max: 18   SpO2  Min: 95 %  Max: 96 %   No data recorded       Input/Output for last 24 hour shift  12/11 0701 - 12/12 0700  In: 1987 [P.O.:1987]  Out: 355 [Urine:325; Drains:30]      Objective  General Appearance: Awake, alert, in no acute distress  Head:    Atraumatic, Normocephalic, without obvious abnormality.   Lungs:   B/L Breath sounds present with decreased breath sounds on bases, no wheezing heard, occ crackles.  Chest tube in place  Heart: S1 and S2 present, no murmur  Abdomen: Soft, nontender, no guarding or rigidity, bowel sounds positive.  Extremities:  no cyanosis or clubbing,  no edema, warm to touch.  Neurologic:  Moving all four extremities. Good strength bilaterally.   Psychological: Normal affect, Cooperative    Results from last 7 days   Lab Units 12/12/22  0850 12/11/22  0456 12/10/22  0641   WBC 10*3/mm3 8.08 8.85 8.36   HEMOGLOBIN g/dL 10.0* 9.1* 8.6*   PLATELETS 10*3/mm3 453* 406 386     Results from last 7 days   Lab Units 12/12/22  0850 12/11/22  0456 12/10/22  0641   SODIUM mmol/L 133* 131* 131*   POTASSIUM mmol/L 4.0 4.1 3.9   CO2 mmol/L 27.0 26.0 29.0   BUN mg/dL 11 13 8   CREATININE mg/dL 0.63* 0.52* 0.61*   MAGNESIUM mg/dL 2.0  --   --    PHOSPHORUS mg/dL 3.0  --   --    GLUCOSE mg/dL 142* 110* 137*     Estimated Creatinine Clearance: 95.9 mL/min (A) (by C-G formula based on SCr of 0.63 mg/dL (L)).          Images:   Chest CT scan reviewed personally.    I reviewed the patient's results and images.     Assessment & Plan   Impression        Bronchial pneumonia    Benign hypertrophy of prostate    Mixed hyperlipidemia    Essential hypertension, benign    Chest tube in place    Weight loss    Empyema lung (HCC)    Severe malnutrition (HCC)       Plan        1.  Patient  presenting here with left-sided empyema with loculated pleural effusion.  Left lower lobe pleural effusion has been completely drained out.  I think we can safely discontinue chest tube now.  There is still a loculated component in the left anterior chest wall which has not improved.  Will discuss with interventional neurology to see if they can aspirate it further.  Patient clinically is doing better and I think we can get by with simple aspiration.  If that does not work then will recommend treatment with antibiotics and follow-up closely with repeat CT imaging to make sure this is improving if not then patient will need decortication.  Since clinically he is doing well we can monitor him for now.  Discussed with ID team.  Will discuss with interventional etiology as well.    2.  Subcutaneous emphysema after chest tube placement.  Will monitor for now.  Hopefully after removal of chest tube we will see improvement in that.  No obvious pneumothorax noted.    Will continue to follow along    Time spent 30 min (exclusive of procedure time)  including high complexity decision making to assess, manipulate, and support vital organ system failure in this individual who has impairment of one or more vital organ systems such that there is a high probability of imminent or life threatening deterioration in the patient’s condition.      Omari Jeffers MD, [facility]  Pulmonary, Critical care and Sleep Medicine

## 2022-12-13 ENCOUNTER — APPOINTMENT (OUTPATIENT)
Dept: GENERAL RADIOLOGY | Facility: HOSPITAL | Age: 72
End: 2022-12-13

## 2022-12-13 ENCOUNTER — READMISSION MANAGEMENT (OUTPATIENT)
Dept: CALL CENTER | Facility: HOSPITAL | Age: 72
End: 2022-12-13

## 2022-12-13 VITALS
HEIGHT: 69 IN | DIASTOLIC BLOOD PRESSURE: 80 MMHG | TEMPERATURE: 97.5 F | HEART RATE: 82 BPM | WEIGHT: 143 LBS | BODY MASS INDEX: 21.18 KG/M2 | RESPIRATION RATE: 16 BRPM | SYSTOLIC BLOOD PRESSURE: 126 MMHG | OXYGEN SATURATION: 96 %

## 2022-12-13 LAB
BACTERIA FLD CULT: ABNORMAL
BACTERIA SPEC ANAEROBE CULT: NORMAL
BACTERIA SPEC ANAEROBE CULT: NORMAL
CRP SERPL-MCNC: 4.45 MG/DL (ref 0–0.5)
GRAM STN SPEC: ABNORMAL
GRAM STN SPEC: ABNORMAL

## 2022-12-13 PROCEDURE — 71045 X-RAY EXAM CHEST 1 VIEW: CPT

## 2022-12-13 PROCEDURE — C1751 CATH, INF, PER/CENT/MIDLINE: HCPCS

## 2022-12-13 PROCEDURE — 02HV33Z INSERTION OF INFUSION DEVICE INTO SUPERIOR VENA CAVA, PERCUTANEOUS APPROACH: ICD-10-PCS | Performed by: INTERNAL MEDICINE

## 2022-12-13 PROCEDURE — 86140 C-REACTIVE PROTEIN: CPT | Performed by: INTERNAL MEDICINE

## 2022-12-13 PROCEDURE — 99239 HOSP IP/OBS DSCHRG MGMT >30: CPT | Performed by: INTERNAL MEDICINE

## 2022-12-13 PROCEDURE — 97116 GAIT TRAINING THERAPY: CPT

## 2022-12-13 PROCEDURE — 99232 SBSQ HOSP IP/OBS MODERATE 35: CPT | Performed by: INTERNAL MEDICINE

## 2022-12-13 PROCEDURE — 25010000002 CEFTRIAXONE PER 250 MG: Performed by: INTERNAL MEDICINE

## 2022-12-13 PROCEDURE — C1894 INTRO/SHEATH, NON-LASER: HCPCS

## 2022-12-13 RX ORDER — SODIUM CHLORIDE 0.9 % (FLUSH) 0.9 %
10 SYRINGE (ML) INJECTION EVERY 12 HOURS SCHEDULED
Status: DISCONTINUED | OUTPATIENT
Start: 2022-12-13 | End: 2022-12-13 | Stop reason: HOSPADM

## 2022-12-13 RX ORDER — FLUCONAZOLE 200 MG/1
400 TABLET ORAL EVERY 24 HOURS
Qty: 58 TABLET | Refills: 0 | Status: SHIPPED | OUTPATIENT
Start: 2022-12-13 | End: 2023-01-11

## 2022-12-13 RX ORDER — SODIUM CHLORIDE 0.9 % (FLUSH) 0.9 %
10 SYRINGE (ML) INJECTION AS NEEDED
Status: DISCONTINUED | OUTPATIENT
Start: 2022-12-13 | End: 2022-12-13 | Stop reason: HOSPADM

## 2022-12-13 RX ADMIN — LISINOPRIL 40 MG: 40 TABLET ORAL at 09:46

## 2022-12-13 RX ADMIN — ATORVASTATIN CALCIUM 20 MG: 20 TABLET, FILM COATED ORAL at 09:46

## 2022-12-13 RX ADMIN — SODIUM CHLORIDE 2 G: 900 INJECTION INTRAVENOUS at 13:12

## 2022-12-13 RX ADMIN — FINASTERIDE 5 MG: 5 TABLET, FILM COATED ORAL at 09:46

## 2022-12-13 RX ADMIN — METOPROLOL SUCCINATE 100 MG: 100 TABLET, EXTENDED RELEASE ORAL at 09:46

## 2022-12-13 NOTE — THERAPY TREATMENT NOTE
Patient Name: Rhett Isaacs  : 1950    MRN: [id number]                              Today's Date: 2022       Admit Date: 2022    Visit Dx: No diagnosis found.  Patient Active Problem List   Diagnosis   • Benign hypertrophy of prostate   • Mixed hyperlipidemia   • Essential hypertension, benign   • Lung mass   • Bronchial pneumonia   • Chest tube in place   • Weight loss   • Empyema lung (HCC)   • Severe malnutrition (HCC)     Past Medical History:   Diagnosis Date   • Acute bronchitis    • Acute prostatitis    • Acute serous otitis media    • Benign prostatic hyperplasia    • Cancer (HCC) 2015    Basil cell carcinoma   • Cataract     still have them- bilat   • Colon polyp 10/2017   • Diabetes mellitus ([facility]) 1967    doesnt checks sugar   • Diverticulosis 2004    Found in first colonoscopy   • Dysuria    • Fatigue    • GERD (gastroesophageal reflux disease)    • Hyperlipidemia    • Hypertension    • Impacted cerumen    • Prostatitis    • PVC (premature ventricular contraction)    • Wears glasses     readers     Past Surgical History:   Procedure Laterality Date   • ADENOIDECTOMY     • BASAL CELL CARCINOMA EXCISION     • BRONCHOSCOPY N/A 2022    Procedure: BRONCHOSCOPY WITH ENDOBRONCHIAL ULTRASOUND WITH FLUOROSCOPY;  Surgeon: Jeremias Hughes MD;  Location: [facility] ENDOSCOPY;  Service: Pulmonary;  Laterality: N/A;  EBUS balloon removed and intact   • COLONOSCOPY  10/23/2017   • EYE SURGERY  2015    Basil cell carcenoma   • TONSILLECTOMY     • WISDOM TOOTH EXTRACTION        General Information     Row Name 22 112          Physical Therapy Time and Intention    Document Type therapy note (daily note)  -CM     Mode of Treatment individual therapy;physical therapy  -CM     Row Name 22 1122          General Information    Patient Profile Reviewed yes  -CM     Existing Precautions/Restrictions fall  -CM     Barriers to Rehab none identified  -CM     Row  Name 12/13/22 1122          Cognition    Orientation Status (Cognition) oriented x 4  -CM     Row Name 12/13/22 1122          Safety Issues, Functional Mobility    Safety Issues Affecting Function (Mobility) awareness of need for assistance;insight into deficits/self-awareness;safety precaution awareness  -CM     Impairments Affecting Function (Mobility) balance;endurance/activity tolerance;strength  -CM           User Key  (r) = Recorded By, (t) = Taken By, (c) = Cosigned By    Initials Name Provider Type    Lisandra Bustos PT Physical Therapist               Mobility     Row Name 12/13/22 1123          Bed Mobility    Bed Mobility supine-sit  -CM     Supine-Sit [place] (Bed Mobility) modified independence  -CM     Assistive Device (Bed Mobility) bed rails;head of bed elevated  -CM     Comment, (Bed Mobility) patient demonstrates adequate sequencing without cues  -CM     Row Name 12/13/22 1123          Sit-Stand Transfer    Sit-Stand [place] (Transfers) contact guard  -CM     Assistive Device (Sit-Stand Transfers) walker, front-wheeled  -CM     Comment, (Sit-Stand Transfer) STSx2 performed. Once without FWW and once with FWW. CGA with each repetition  -CM     Row Name 12/13/22 1123          Gait/Stairs (Locomotion)    [place] Level (Gait) contact guard;minimum assist (75% patient effort)  -CM     Assistive Device (Gait) walker, front-wheeled  -CM     Distance in Feet (Gait) 30+200  -CM     Deviations/Abnormal Patterns (Gait) gait speed decreased;stride length decreased  -CM     Comment, (Gait/Stairs) Patient ambulated once in room and once in hallway. In room he used no AD and demonstrated unsteadiness with 1 LOB requiring Sylvia to recover. With addition of FWW he was able to ambulate in hallway with CGA with no LOB. Patient denies SOA or dizziness with ambulation. VSS with activity.  -CM           User Key  (r) = Recorded By, (t) = Taken By, (c) = Cosigned By    Initials Name Provider Type     Lisandra Bustos, PT Physical Therapist               Obj/Interventions     Row Name 12/13/22 1127          Balance    Balance Assessment sitting static balance;standing static balance;standing dynamic balance  -CM     Static Sitting Balance independent  -CM     Position, Sitting Balance unsupported;sitting edge of bed  -CM     Static Standing Balance standby assist  -CM     Dynamic Standing Balance contact guard  -CM     Position/Device Used, Standing Balance supported;walker, front-wheeled  -CM           User Key  (r) = Recorded By, (t) = Taken By, (c) = Cosigned By    Initials Name Provider Type    Lisandra Bustos, PT Physical Therapist               Goals/Plan    No documentation.                Clinical Impression     Row Name 12/13/22 1128          Pain    Pretreatment Pain Rating 0/10 - no pain  -CM     Posttreatment Pain Rating 0/10 - no pain  -CM     Row Name 12/13/22 1128          Plan of Care Review    Plan of Care Reviewed With patient;spouse  -CM     Progress improving  -CM     Outcome Evaluation Patient showing improvement with ambulation distance today. He ambulated 30' no AD with Sylvia and 200' with RW and CGA. He is mostly limited in his mobility by balance deficits. Will continue with current POC including D/C home with assist.  -CM     Row Name 12/13/22 1128          Vital Signs    Pre Systolic BP Rehab 113  -CM     Pre Treatment Diastolic BP 77  -CM     Pretreatment Heart Rate (beats/min) 90  -CM     Posttreatment Heart Rate (beats/min) 94  -CM     Pre SpO2 (%) 96  -CM     O2 Delivery Pre Treatment room air  -CM     Post SpO2 (%) 96  -CM     O2 Delivery Post Treatment room air  -CM     Pre Patient Position Supine  -CM     Post Patient Position Supine  -CM     Row Name 12/13/22 1128          Positioning and Restraints    Pre-Treatment Position in bed  -CM     Post Treatment Position bed  -CM     In Bed fowlers;call light within reach;encouraged to call for assist;with  family/caregiver  -CM           User Key  (r) = Recorded By, (t) = Taken By, (c) = Cosigned By    Initials Name Provider Type    Lisandra Bustos PT Physical Therapist               Outcome Measures    No documentation.                              Physical Therapy Education     Title: PT OT SLP Therapies (In Progress)     Topic: Physical Therapy (In Progress)     Point: Mobility training (Done)     Learning Progress Summary           Patient Acceptance, E, VU by CM at 12/13/2022 1130    Comment: Patient and wife educated on use of FWW at discharge due to current balance deficits. Both agreeable stating he has one at home.    Acceptance, E, VU by CM at 12/9/2022 0944   Significant Other Acceptance, E, VU by CM at 12/13/2022 1130    Comment: Patient and wife educated on use of FWW at discharge due to current balance deficits. Both agreeable stating he has one at home.    Acceptance, E, VU by CM at 12/9/2022 0944                   Point: Home exercise program (Not Started)     Learner Progress:  Not documented in this visit.          Point: Body mechanics (Not Started)     Learner Progress:  Not documented in this visit.          Point: Precautions (Done)     Learning Progress Summary           Patient Acceptance, E, VU by CM at 12/13/2022 1130    Comment: Patient and wife educated on use of FWW at discharge due to current balance deficits. Both agreeable stating he has one at home.    Acceptance, E, VU by CM at 12/9/2022 0944   Significant Other Acceptance, E, VU by CM at 12/13/2022 1130    Comment: Patient and wife educated on use of FWW at discharge due to current balance deficits. Both agreeable stating he has one at home.    Acceptance, E, VU by CM at 12/9/2022 0944                               User Key     Initials Effective Dates Name Provider Type Discipline    LILY 09/22/22 -  Lisandra Allen PT Physical Therapist PT              PT Recommendation and Plan  Planned Therapy Interventions (PT):  balance training, bed mobility training, gait training, home exercise program, postural re-education, patient/family education, neuromuscular re-education, motor coordination training, ROM (range of motion), stair training, strengthening, transfer training  Plan of Care Reviewed With: patient, spouse  Progress: improving  Outcome Evaluation: Patient showing improvement with ambulation distance today. He ambulated 30' no AD with Sylvia and 200' with RW and CGA. He is mostly limited in his mobility by balance deficits. Will continue with current POC including D/C home with assist.     Time Calculation:    PT Charges     Row Name 12/13/22 1134             Time Calculation    Start Time 1055  -CM      PT Received On 12/13/22  -CM      PT Goal Re-Cert Due Date 12/19/22  -CM         Timed Charges    58632 - Gait Training Minutes  15  -CM         Total Minutes    Timed Charges Total Minutes 15  -CM       Total Minutes 15  -CM            User Key  (r) = Recorded By, (t) = Taken By, (c) = Cosigned By    Initials Name Provider Type    Lisandra Bustos, PT Physical Therapist              Therapy Charges for Today     Code Description Service Date Service Provider Modifiers Qty    60982671792 HC GAIT TRAINING EA 15 MIN 12/13/2022 Lisandra Allen, PT GP 1          PT G-Codes  Outcome Measure Options: AM-PAC 6 Clicks Daily Activity (OT)  AM-PAC 6 Clicks Score (PT): 21  AM-PAC 6 Clicks Score (OT): 20  PT Discharge Summary  Anticipated Discharge Disposition (PT): home with assist    Lisandra Allen, VIRGIL  12/13/2022

## 2022-12-13 NOTE — PLAN OF CARE
Goal Outcome Evaluation:  Plan of Care Reviewed With: patient, spouse        Progress: improving  Outcome Evaluation: Patient showing improvement with ambulation distance today. He ambulated 30' no AD with Sylvia and 200' with RW and CGA. He is mostly limited in his mobility by balance deficits. Will continue with current POC including D/C home with assist.

## 2022-12-13 NOTE — PROGRESS NOTES
INFECTIOUS DISEASE Progress Note    Rhett Isaacs  1950  [id number]      Admission Date: 12/8/2022      Requesting Provider: Jeremias Hughes MD  Evaluating Physician: George Underwood MD    Reason for Consultation: empyema    History of present illness:    12/9/22: Patient is a 72 y.o. male with h/o BPH, HTN, and HLD who was admitted to [facility] ED from interventional radiology after chest tube placement.  The patient had an aspiration event after vomiting in his sleep in September 2022.  He was on 2 round of antibiotics and improved.  A CT scan on 10/18 showed a 6.3 cm mass along the left anterior pleural margin of DIEGO with loculated pleural effusion.  He was referred to pulmonology and underwent a bronchoscopy by Dr. Hughes on 11/9.  The pathology showed lipoid pneumonia.  All cultures including AFB were negative.  He underwent a CT-guided aspiration of left lung mass on 12/8 which showed that it was an abscess.  He also had a chest tube placed in the left empyema.  Pleural fluid had 89% neutrophils with cell count pending.  Cultures are negative to date and pending.  He is afebrile.  Pertinent labs were CRP 10.34, ESR 87, Ferritin 869, WBC 9500 with 67% neutrophils, and creatinine 0.55. A CXR showed a new small bore right-sided chest tube in left posterior pleural space with decreased left-sided airspace disease. He is currently on Zosyn and Linezolid.  ID was asked to evaluate and manage his antibiotic therapy. The anterior fluid collection was drained with aspiration by interventional radiology and a chest tube was placed in the posterior collection.  He is now draining thick purulent material from the drainage catheter.  Pulmonary medicine has placed alteplase and Pulmozyme to assist with drainage. Gram stain of his pleural fluid reveals moderate white blood cells with moderate gram-positive cocci in pairs and clusters.  His nasal MRSA PCR is negative.    On examination, the patient was very  uncomfortable as Alteplase and Dornase alpha was injected into the pleural cavity and drain clamped.  His wife indicates that he has progressively worsened over the last several weeks with anorexia, weight loss, malaise, and fatigue.  He has lost over 30 pounds.      12/10/22: He has remained afebrile over the last 24 hours. His white blood cell count today is 8.4. He continues to complain of malaise and fatigue.  He has anorexia.  He complains of a metallic taste from the metronidazole. His empyema cultures are growing gram-positive cocci.    12/11/2022: His empyema cultures have grown Streptococcus intermedius.  The isolate is sensitive to vancomycin.  E-strips are being utilized to obtain additional sensitivity testing-I discussed this with the microbiology lab today.  He now feels better.  He has an improved appetite and increased nutritional intake.  He has remained afebrile.  His white blood cell count is 8.9.  He has decreased chest tube output.  His tPA/dornase has been discontinued.  Pulmonary medicine has ordered a follow-up chest CT scan.  His chest x-ray today reveals a dense residual opacity in the left midlung field which is similar to previous x-rays.    12/12/22: He remains afebrile. Chest CT scan today reveals significant improvement in the posterior empyema.  He denies increased cough and sputum production.  His appetite is improved.  He denies nausea, vomiting, and diarrhea. His pleural fluid fungal stain is reported as positive for yeast. His Streptococcus intermedius is now reported to be resistant to ceftriaxone and penicillin.     12/13/22:  He has decreased cough and sputum production.  He denies increased dyspnea and pleuritic chest pain.  Overall, he feels better.His C-reactive protein today has decreased to 4.45 from 10.34 on 12/8. He denies nausea, vomiting, and diarrhea.    Past Medical History:   Diagnosis Date   • Acute bronchitis    • Acute prostatitis    • Acute serous otitis media     • Benign prostatic hyperplasia    • Cancer (HCC) 09/2015    Basil cell carcinoma   • Cataract     still have them- bilat   • Colon polyp 10/2017   • Diabetes mellitus (HCC) 06/1967    doesnt checks sugar   • Diverticulosis 2004    Found in first colonoscopy   • Dysuria    • Fatigue    • GERD (gastroesophageal reflux disease) 2016   • Hyperlipidemia    • Hypertension    • Impacted cerumen    • Prostatitis    • PVC (premature ventricular contraction)    • Wears glasses     readers       Past Surgical History:   Procedure Laterality Date   • ADENOIDECTOMY  1956   • BASAL CELL CARCINOMA EXCISION  2015   • BRONCHOSCOPY N/A 11/9/2022    Procedure: BRONCHOSCOPY WITH ENDOBRONCHIAL ULTRASOUND WITH FLUOROSCOPY;  Surgeon: Jeremias Hughes MD;  Location: [facility] ENDOSCOPY;  Service: Pulmonary;  Laterality: N/A;  EBUS balloon removed and intact   • COLONOSCOPY  10/23/2017   • EYE SURGERY  September 2015    Basil cell carcenoma   • TONSILLECTOMY     • WISDOM TOOTH EXTRACTION         Family History   Problem Relation Age of Onset   • Alcohol abuse Father    • Hyperlipidemia Mother    • Hypertension Brother        Social History     Socioeconomic History   • Marital status:    Tobacco Use   • Smoking status: Never   • Smokeless tobacco: Never   Vaping Use   • Vaping Use: Never used   Substance and Sexual Activity   • Alcohol use: No   • Drug use: No   • Sexual activity: Never       Allergies   Allergen Reactions   • Penicillins Diarrhea   • Cardizem [Diltiazem Hcl] Rash   • Sulfa Antibiotics Rash         Medication:    Current Facility-Administered Medications:   •  acetaminophen (TYLENOL) tablet 650 mg, 650 mg, Oral, Q4H PRN, 650 mg at 12/12/22 1340 **OR** acetaminophen (TYLENOL) 160 MG/5ML solution 650 mg, 650 mg, Oral, Q4H PRN **OR** acetaminophen (TYLENOL) suppository 650 mg, 650 mg, Rectal, Q4H PRN, Kaleb Chapman DO  •  atorvastatin (LIPITOR) tablet 20 mg, 20 mg, Oral, Daily, Kaleb Chapman DO, 20 mg  at 12/12/22 0822  •  ertapenem (INVanz) 1 g/100 mL 0.9% NS VTB (mbp), 1 g, Intravenous, Q24H, George Underwood MD, 1 g at 12/12/22 2254  •  finasteride (PROSCAR) tablet 5 mg, 5 mg, Oral, Daily, Kaleb Chapman DO, 5 mg at 12/12/22 0822  •  fluconazole (DIFLUCAN) tablet 400 mg, 400 mg, Oral, Q24H, George Underwood MD, 400 mg at 12/12/22 2254  •  lisinopril (PRINIVIL,ZESTRIL) tablet 40 mg, 40 mg, Oral, Q24H, Kaleb Chapman DO, 40 mg at 12/12/22 0822  •  metoprolol succinate XL (TOPROL-XL) 24 hr tablet 100 mg, 100 mg, Oral, Daily, Kaleb Chapman DO, 100 mg at 12/12/22 0822  •  ondansetron (ZOFRAN) tablet 4 mg, 4 mg, Oral, Q6H PRN **OR** ondansetron (ZOFRAN) injection 4 mg, 4 mg, Intravenous, Q6H PRN, Kaleb Chapman DO  •  Pharmacy to dose vancomycin, , Does not apply, Continuous PRN, George Underwood MD  •  sodium chloride 0.9 % flush 10 mL, 10 mL, Intravenous, Q12H, Kaleb Chapman DO, 10 mL at 12/12/22 2254  •  sodium chloride 0.9 % flush 10 mL, 10 mL, Intravenous, PRN, Kaleb Chapman DO  •  sodium chloride 0.9 % infusion 40 mL, 40 mL, Intravenous, PRN, Kaleb Chapman DO  •  traMADol (ULTRAM) tablet 50 mg, 50 mg, Oral, Q6H PRN, Kaleb Chapman DO, 50 mg at 12/10/22 2058  •  vancomycin (VANCOCIN) 1000 mg/200 mL dextrose 5% IVPB, 1,000 mg, Intravenous, Q12H, Anna Davis, [facility]    Antibiotics:  Anti-Infectives (From admission, onward)    Ordered     Dose/Rate Route Frequency Start Stop    12/12/22 2113  vancomycin (VANCOCIN) 1000 mg/200 mL dextrose 5% IVPB        Ordering Provider: Anna Davis RPH    1,000 mg  over 60 Minutes Intravenous Every 12 Hours 12/13/22 1000 12/22/22 2159 12/12/22 2113  vancomycin 1250 mg/250 mL 0.9% NS IVPB ([facility])        Ordering Provider: Anna Davis RPH    20 mg/kg × 64.9 kg  over 90 Minutes Intravenous Once 12/12/22 2200 12/13/22 0101    12/12/22 2103  Pharmacy to dose vancomycin        Ordering Provider:  George Underwood MD     Does not apply Continuous PRN 22  fluconazole (DIFLUCAN) tablet 400 mg        Ordering Provider: George Underwood MD    400 mg Oral Every 24 Hours 22  ertapenem (INVanz) 1 g/100 mL 0.9% NS VTB (mbp)        Ordering Provider: George Underwood MD    1 g  over 30 Minutes Intravenous Every 24 Hours 22 1335  piperacillin-tazobactam (ZOSYN) 4.5 g in iso-osmotic dextrose 100 mL IVPB (premix)        Ordering Provider: Jeremias Hughes MD    4.5 g  over 30 Minutes Intravenous Once 22 1430 22 1606            Review of Systems:  See HPI    Physical Exam:   Vital Signs  Temp (24hrs), Av.2 °F (36.2 °C), Min:96.6 °F (35.9 °C), Max:98 °F (36.7 °C)    Temp  Min: 96.6 °F (35.9 °C)  Max: 98 °F (36.7 °C)  BP  Min: 103/65  Max: 120/79  Pulse  Min: 79  Max: 97  Resp  Min: 16  Max: 18  SpO2  Min: 95 %  Max: 97 %    GENERAL: Awake and alert, in moderate distress. Frail appearing.   HEENT: Normocephalic, atraumatic.  PERRL. EOMI. No conjunctival injection. No icterus. Oropharynx clear without evidence of thrush or exudate.  NECK: Supple  HEART: RRR; No murmur, rubs, gallops.   LUNGS: diminished in left lung field, scattered rales in right lung field without wheezing. Normal respiratory effort. Nonlabored. His left chest tube is now out.  ABDOMEN: Soft, nontender, nondistended.  No rebound or guarding. NO mass or HSM.  EXT:  No cyanosis, clubbing or edema. No cord.  :  Without Rivera catheter.  MSK: No joint effusions or erythema  SKIN: Warm and dry without cutaneous eruptions on Inspection/palpation.    NEURO: Oriented to PPT.  Motor 5/5 strength  PSYCHIATRIC: Normal insight and judgment. Cooperative with PE    Laboratory Data    Results from last 7 days   Lab Units 22  0850 22  0456 12/10/22  0641   WBC 10*3/mm3 8.08 8.85 8.36   HEMOGLOBIN g/dL 10.0* 9.1*  8.6*   HEMATOCRIT % 33.4* 29.7* 27.5*   PLATELETS 10*3/mm3 453* 406 386     Results from last 7 days   Lab Units 12/12/22  0850   SODIUM mmol/L 133*   POTASSIUM mmol/L 4.0   CHLORIDE mmol/L 97*   CO2 mmol/L 27.0   BUN mg/dL 11   CREATININE mg/dL 0.63*   GLUCOSE mg/dL 142*   CALCIUM mg/dL 9.6     Results from last 7 days   Lab Units 12/09/22  0523   ALK PHOS U/L 96   BILIRUBIN mg/dL 0.4   ALT (SGPT) U/L 23   AST (SGOT) U/L 17     Results from last 7 days   Lab Units 12/08/22  0722   SED RATE mm/hr 87*     Results from last 7 days   Lab Units 12/13/22  0446   CRP mg/dL 4.45*                 Estimated Creatinine Clearance: 95.9 mL/min (A) (by C-G formula based on SCr of 0.63 mg/dL (L)).      Microbiology:  Microbiology Results (last 10 days)     Procedure Component Value - Date/Time    MRSA Screen, PCR (Inpatient) - Swab, Nares [[id number]]  (Normal) Collected: 12/09/22 1209    Lab Status: Final result Specimen: Swab from Nares Updated: 12/09/22 1424     MRSA PCR Negative    Narrative:      The negative predictive value of this diagnostic test is high and should only be used to consider de-escalating anti-MRSA therapy. A positive result may indicate colonization with MRSA and must be correlated clinically.  MRSA Negative    Body Fluid Culture - Body Fluid, Lung, Left Lower Lobe [[id number]]  (Abnormal) Collected: 12/08/22 1143    Lab Status: Final result Specimen: Body Fluid from Lung, Left Lower Lobe Updated: 12/12/22 0943     Body Fluid Culture Moderate growth (3+) Streptococcus intermedius     Gram Stain Many (4+) WBCs seen      No organisms seen    Narrative:      Refer to previous wound culture collected on 12/08/2022 1138 for MICs    Anaerobic Culture - Body Fluid, Lung, Left Lower Lobe [[id number]]  (Normal) Collected: 12/08/22 1143    Lab Status: Preliminary result Specimen: Body Fluid from Lung, Left Lower Lobe Updated: 12/11/22 0820     Anaerobic Culture No anaerobes isolated at 3 days    AFB Culture - Body  Fluid, Pleural Cavity [[id number]] Collected: 12/08/22 1143    Lab Status: Preliminary result Specimen: Body Fluid from Pleural Cavity Updated: 12/10/22 1134     AFB Stain No acid fast bacilli seen on concentrated smear    Fungus Smear - Body Fluid, Pleural Cavity [[id number]]  (Abnormal) Collected: 12/08/22 1143    Lab Status: Final result Specimen: Body Fluid from Pleural Cavity Updated: 12/12/22 1357     Fungal Stain Moderate (3+) Yeast     Comment: Smear read by pathologist        Body Fluid Culture - Body Fluid, Pleural Cavity [[id number]]  (Abnormal)  (Susceptibility) Collected: 12/08/22 1138    Lab Status: Final result Specimen: Body Fluid from Pleural Cavity Updated: 12/12/22 0943     Body Fluid Culture Moderate growth (3+) Streptococcus intermedius     Gram Stain Moderate (3+) WBCs seen      Moderate (3+) Gram positive cocci in pairs and clusters    Susceptibility      Streptococcus intermedius      FARHEEN Not Specified      Ceftriaxone  Resistant     Penicillin G  Resistant     Vancomycin Susceptible                              Anaerobic Culture - Body Fluid, Lung, Left Lower Lobe [[id number]]  (Normal) Collected: 12/08/22 1138    Lab Status: Preliminary result Specimen: Body Fluid from Lung, Left Lower Lobe Updated: 12/11/22 0818     Anaerobic Culture No anaerobes isolated at 3 days                Radiology:  CT Chest Without Contrast Diagnostic    Result Date: 12/12/2022  1. Left pleural drain remains in place in the posterior pleural space. No significant residual undrained collection here. 2. Small residual anterior pleural fluid collection, with maximal dimensions of 3.4 x 1.0 x 3.3 cm. 3. Residual left lung scarring/atelectasis, although markedly improved from 12/08/2022, prior to drainage. No new chest disease is seen elsewhere. 4. Moderately extensive left sided subcutaneous emphysema as on concurrent chest radiograph.  This report was finalized on 12/12/2022 11:25 PM by Dr. Jacob Hernandez MD.      XR Chest  1 View    Result Date: 12/12/2022  1.  Persistent airspace disease within the left lower chest. 2.  Subcutaneous air along the left lateral chest wall extending into the neck.  This report was finalized on 12/12/2022 7:18 AM by Remington Sabillon MD.      XR Chest 1 View    Result Date: 12/11/2022  Small bore left-sided chest tube projects unchanged. Dense opacity in the left midlung field is similar to comparison. There is no significant effusion or distinct pneumothorax. The right lung remains clear. Unchanged heart and mediastinal contours.  This report was finalized on 12/11/2022 8:17 AM by Rhett Raygoza.      XR Chest 1 View    Result Date: 12/10/2022  Mildly decreased loculated left pleural effusion with improved aeration of the left lung base.  This report was finalized on 12/10/2022 8:53 AM by Mauro Baron.      XR Chest 1 View    Result Date: 12/9/2022  1. New small bore right-sided chest tube likely within the posterior left pleural space. Decreased left-sided airspace disease compared to the prior exam.  This report was finalized on 12/9/2022 7:28 AM by Gio Barney MD.      CT Guided Chest Tube    Result Date: 12/8/2022  Impression:                                                              Successful CT guided aspiration of a left lung mass that turned out to be a lung abscess, likely related to the history of prior aspiration.  Successful CT-guided placement of an 8.5 Greek drainage catheter in the left empyema.  Thank you for the opportunity to assist in the care of your patient.  This report was finalized on 12/8/2022 11:58 AM by Ollie Segura MD.      CT Guided Abscess Drain Lung    Result Date: 12/8/2022  Impression:                                                              Successful CT guided aspiration of a left lung mass that turned out to be a lung abscess, likely related to the history of prior aspiration.  Successful CT-guided placement of an 8.5 Greek drainage catheter in the  left empyema.  Thank you for the opportunity to assist in the care of your patient.  This report was finalized on 12/8/2022 11:58 AM by Ollie Segura MD.    His chest CT scan today reveals marked improvement in the left posterior chest fluid collection      Impression:   1. Strep intermedius/?Yeast left empyema-he has 2 loculated areas of empyema in the left hemithorax with the largest area posteriorly.  Streptococcus intermedius is now reported to be resistant to ceftriaxone and penicillin. He is now on oral fluconazole and intravenous vancomycin/Invanz.  2. Aspiration pneumonia/lipoid pneumonia  3. Anemia of chronic disease  4. Essential hypertension  5. Benign prostatic hyperplasia  6. Penicillin (diarrhea) and sulfa (rash) allergies.     Streptococcus intermedius is generally sensitive to both ceftriaxone and penicillin.  This isolate is reportedly resistant to both of these antibiotics.  I am unsure if this is accurate information. I had some of his additional sensitivity data unmasked by the microbiology lab and the isolate is actually sensitive to ampicillin which is discordant with his penicillin and ceftriaxone resistance. I have asked the microbiology lab to repeat his Streptococcus intermedius ceftriaxone sensitivity and also obtain Zyvox and Invanz sensitivity testing.  I also had the head of the microbiology lab here review his fungal stains.  She thinks that this is likely bacterial rather than fungal and will have the slides reviewed by the pathologist.  I doubt that he has a fungal empyema.    PLAN/RECOMMENDATIONS:  1.  Discharge home today  2.  Discontinue Invanz  3.  Discontinue vancomycin  4.  Discontinue Fluconazole   5.  PICC line placement  6.  Repeat Streptococcus intermedius ceftriaxone sensitivity  7.  Zyvox and Invanz Streptococcus intermedius sensitivity testing  8.  Reassessment of the fungal stain on the pleural fluid-I discussed this with Tatiana Hernandez in the lab  9.  Follow-up with  me tomorrow Wednesday 12/14 at 9:30-this appointment has been made  10.  Rocephin 2 g IV ×1 prior to discharge today    I spent over 45 minutes on his complex care today.  I discussed his complex situation with ID clinical pharmacy in detail today.  I discussed his extremely complex microbiology and sensitivity situation with Tatiana Hernandez in the microbiology lab today  I discussed his extremely complex microbiology with the microbiology lab in [place] today  I discussed his situation in detail with Dr. Muñoz today  I discussed the situation at length with the patient himself today.  I coordinated his care.  I will see him in follow-up tomorrow and decide on further antibiotic therapy at that time.      George Underwood MD  12/13/2022  06:54 EST

## 2022-12-13 NOTE — DISCHARGE SUMMARY
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          Hospital Course:  Rhett Isaacs is a 72 y.o. male  nonsmoker w/ PMH of HTN, HLD, BPH and severe GERD symptoms occasional nocturnal vomiting/reflux, who has been evaluated since 2022 for LT lung lesion now s/p needle aspiration with concern for abscess and subsequent chest tube placement     Assessment/Plan     LT lung lesion  Loculated LT pleural effusion/ s/p aspiration and chest tube  Lipoid pneumonia?  -s/p bronch w/ Bx 22 - path c/w lipoid pna, cx data negative  -chest tube placed by IR 22 w/ milky white drainage  -cultures sent, thus far gram positive cocci   -SLP consulted and have signed off  -ID is following and managing the antibiotic treatment  -Pulmonology following and they plan to pull the chest tube out as the drainage has stopped.  However, there is a loculated compartment that may require drainage by IR.     Severe GERD w/ persistent symptoms  Intolerance to PPIs  Abnormal weight  loss  New microcytic anemia  -approx 40lb weight loss over 3 months  -needs EGD to r/o GI malignancy, pt/wife prefer opt f/u w/ Dr. Torres  - TSH wnl, CRP elevated, iron profile c/w AOCD     Mild hyponatremia of unclear significance  -poor oral intake for weeks/months  -hold HCTZ  -- Na stable, will monitor for now        HTN  HLD  BPH  -home atorvastatin, finasteride, lisinopril, metoprolol xl         Discharge Follow Up Recommendations for outpatient labs/diagnostics:       Day of Discharge     HPI:   Resting in bed, no acute distress, comfortable and no specific complaint. No fever or chills. No CP or palpitaion, no SOB. Very eager to go home.    Review of Systems  As above    Vital Signs:   Temp:  [97 °F (36.1 °C)-98 °F (36.7 °C)] 97.5 °F (36.4 °C)  Heart Rate:  [79-98] 82  Resp:  [16] 16  BP: (113-126)/(71-80) 126/80      Physical Exam:  Constitutional: No acute distress, looks comfortable  HENT: NCAT, mucous membranes moist  Respiratory: Clear to auscultation bilaterally, respiratory effort normal, left CT in place.   Cardiovascular: RRR, no murmurs, rubs, or gallops  Gastrointestinal: Positive bowel sounds, soft, nontender, nondistended  Musculoskeletal: No bilateral ankle edema  Psychiatric: Appropriate affect, cooperative  Neurologic: Awake, alert, oriented x3, no focality appreciated, speech clear  Skin: No rashes  Pertinent  and/or Most Recent Results     LAB RESULTS:      Lab 12/13/22  0446 12/12/22  0850 12/11/22  0456 12/10/22  0641 12/09/22  0523 12/08/22  0722   WBC  --  8.08 8.85 8.36 9.01 9.52   HEMOGLOBIN  --  10.0* 9.1* 8.6* 8.4* 9.4*   HEMATOCRIT  --  33.4* 29.7* 27.5* 27.3* 29.7*   PLATELETS  --  453* 406 386 367 390   NEUTROS ABS  --  4.77 5.33 4.75 5.40 6.38   IMMATURE GRANS (ABS)  --   --  0.21* 0.20* 0.27* 0.18*   LYMPHS ABS  --   --  2.12 2.22 2.00 1.75   MONOS ABS  --   --  1.05* 1.03* 1.21* 1.13*   EOS ABS  --  0.08 0.12 0.14 0.12 0.06   MCV  --  81.3 79.6 79.3 79.1 79.0   SED RATE  --    --   --   --   --  87*   CRP 4.45*  --   --   --   --  10.34*   PROTIME  --   --   --   --   --  15.3*         Lab 12/12/22  0850 12/11/22  0456 12/10/22  0641 12/09/22  0523 12/08/22  0722   SODIUM 133* 131* 131* 128* 130*   POTASSIUM 4.0 4.1 3.9 4.4 4.0   CHLORIDE 97* 96* 94* 94* 93*   CO2 27.0 26.0 29.0 27.0 25.0   ANION GAP 9.0 9.0 8.0 7.0 12.0   BUN 11 13 8 7* 10   CREATININE 0.63* 0.52* 0.61* 0.74* 0.55*   EGFR 100.4 106.4 101.4 95.7 104.6   GLUCOSE 142* 110* 137* 123* 131*   CALCIUM 9.6 9.0 9.2 9.1 9.4   MAGNESIUM 2.0  --   --   --   --    PHOSPHORUS 3.0  --   --   --   --    TSH  --   --   --   --  1.410         Lab 12/09/22  0523   TOTAL PROTEIN 7.3   ALBUMIN 2.60*   GLOBULIN 4.7   ALT (SGPT) 23   AST (SGOT) 17   BILIRUBIN 0.4   ALK PHOS 96         Lab 12/08/22 0722   PROTIME 15.3*   INR 1.22*             Lab 12/08/22  0722   IRON 19*   IRON SATURATION 7*   TIBC 259*   TRANSFERRIN 174*   FERRITIN 869.20*         Brief Urine Lab Results       None          Microbiology Results (last 10 days)       Procedure Component Value - Date/Time    MRSA Screen, PCR (Inpatient) - Swab, Nares [[id number]]  (Normal) Collected: 12/09/22 1209    Lab Status: Final result Specimen: Swab from Nares Updated: 12/09/22 1424     MRSA PCR Negative    Narrative:      The negative predictive value of this diagnostic test is high and should only be used to consider de-escalating anti-MRSA therapy. A positive result may indicate colonization with MRSA and must be correlated clinically.  MRSA Negative    Body Fluid Culture - Body Fluid, Lung, Left Lower Lobe [[id number]]  (Abnormal) Collected: 12/08/22 1143    Lab Status: Edited Result - FINAL Specimen: Body Fluid from Lung, Left Lower Lobe Updated: 12/13/22 1330     Body Fluid Culture Moderate growth (3+) Streptococcus intermedius     Gram Stain Many (4+) WBCs seen      No organisms seen    Narrative:      Refer to previous wound culture collected on 12/08/2022 1138 for MICs     Anaerobic Culture - Body Fluid, Lung, Left Lower Lobe [[id number]]  (Normal) Collected: 12/08/22 1143    Lab Status: Final result Specimen: Body Fluid from Lung, Left Lower Lobe Updated: 12/13/22 0712     Anaerobic Culture No anaerobes isolated at 5 days    AFB Culture - Body Fluid, Pleural Cavity [[id number]] Collected: 12/08/22 1143    Lab Status: Preliminary result Specimen: Body Fluid from Pleural Cavity Updated: 12/10/22 1134     AFB Stain No acid fast bacilli seen on concentrated smear    Body Fluid Culture - Body Fluid, Pleural Cavity [[id number]]  (Abnormal)  (Susceptibility) Collected: 12/08/22 1138    Lab Status: Preliminary result Specimen: Body Fluid from Pleural Cavity Updated: 12/13/22 1334     Body Fluid Culture Moderate growth (3+) Streptococcus intermedius     Gram Stain Moderate (3+) WBCs seen      Moderate (3+) Gram positive cocci in pairs and clusters    Narrative:      12/13: Repeating MICs per Dr. Underwood.     Susceptibility        Streptococcus intermedius      FARHEEN Not Specified      Ceftriaxone  Resistant     Penicillin G  Resistant     Vancomycin Susceptible                              Anaerobic Culture - Body Fluid, Lung, Left Lower Lobe [[id number]]  (Normal) Collected: 12/08/22 1138    Lab Status: Final result Specimen: Body Fluid from Lung, Left Lower Lobe Updated: 12/13/22 0710     Anaerobic Culture No anaerobes isolated at 5 days            CT Chest Without Contrast Diagnostic    Result Date: 12/12/2022  DATE OF EXAM: 12/11/2022 10:41 PM  PROCEDURE: CT CHEST WO CONTRAST DIAGNOSTIC-  INDICATIONS: Empyema  COMPARISON: 12/08/2022 chest CT scan, 12/11/2022 chest radiograph.  TECHNIQUE: Routine transaxial slices were obtained through the chest without the administration of intravenous contrast. Reconstructed coronal and sagittal images were also obtained. Automated exposure control and iterative construction methods were used.  The radiation dose reduction device was turned on for each scan  per the ALARA (As Low as Reasonably Achievable) protocol.  FINDINGS: Diffuse left chest subcutaneous emphysema is again seen as on concurrent chest radiograph. Small bore drain is seen in the residual left-sided pleural collection which now appears effectively drained, with residual pleural thickening and some air but no evidence of residual drainable fluid.  The anterior collection contains air and a small amount of fluid, maximal diameter of the residual anterior pleural fluid collection approximately 3.4 x 1.0 x 3.3 cm. No significant pleural fluid collection is seen elsewhere. No inflammatory changes are seen of the chest wall itself. Mediastinal window images show only shotty nodes, and no significant pericardial or pleural effusion. Images of the lungs show moderate residual subpleural scarring/atelectasis of the lingula and left lower lobe, but markedly improved from the 12/08/2022 predrainage chest CT scan. No significant right lung disease is identified.  Included images of the upper abdomen show mild fatty liver change. Spleen is not enlarged. No significant abnormalities are seen of the pancreatic tail adrenal glands, or left upper renal pole. There is a 2 mm right upper renal pole calculus. Bony structures appear to be intact.       1. Left pleural drain remains in place in the posterior pleural space. No significant residual undrained collection here. 2. Small residual anterior pleural fluid collection, with maximal dimensions of 3.4 x 1.0 x 3.3 cm. 3. Residual left lung scarring/atelectasis, although markedly improved from 12/08/2022, prior to drainage. No new chest disease is seen elsewhere. 4. Moderately extensive left sided subcutaneous emphysema as on concurrent chest radiograph.  This report was finalized on 12/12/2022 11:25 PM by Dr. Jacob Hernandez MD.      XR Chest 1 View    Result Date: 12/13/2022   DATE OF EXAM: 12/13/2022 11:52 AM  PROCEDURE: XR CHEST 1 VW-  INDICATIONS: verify PICC  COMPARISON:  Previous same day and prior  TECHNIQUE: Single view  FINDINGS:  Study is limited by overlying support and monitoring apparatus  Tip of the right upper extremity PICC line projects in expected position overlying the SVC.  Heart and mediastinal contours are stable. Consolidation at the left base appears stable. Subcutaneous emphysema overlying the base of the neck and the left chest again noted. No definite pneumothorax is identified. Osseous structures are stable      IMPRESSION : PICC line projects in adequate position  No significant change in left basilar airspace disease[  This report was finalized on 12/13/2022 12:13 PM by Pablo Thomas.      XR Chest 1 View    Result Date: 12/13/2022  DATE OF EXAM: 12/13/2022 9:30 AM  PROCEDURE: XR CHEST 1 VW-  INDICATIONS: verify PICC.  COMPARISON: Chest x-ray 12/13/2022 7:59 AM  TECHNIQUE: Single frontal view of the chest.  FINDINGS: Interval placement of a right upper extremity PICC line with the tip appearing curled/kinked within the mid SVC, likely entering the azygos vein. No significant interval change otherwise.      Interval placement of a right upper extremity PICC line with the tip appearing curled/kinked within the mid SVC, likely entering the azygos vein. No significant interval change otherwise.  This report was finalized on 12/13/2022 10:03 AM by Enio Garcia MD.      XR Chest 1 View    Result Date: 12/13/2022  DATE OF EXAM: 12/13/2022 7:43 AM  PROCEDURE: XR CHEST 1 VW-  INDICATIONS: empyema, post chest tube removal.  COMPARISON: Chest x-ray 12/13/2022  TECHNIQUE: Single frontal view of the chest.  FINDINGS: Stable cardiomediastinal silhouette within normal limits. Redemonstration of patchy parenchymal and coarse interstitial opacities in the mid and lower left lung, grossly unchanged from prior. There is similar mild elevation of the left hemidiaphragm. The right lung is clear. No evidence of new or worsening airspace disease. There is similar biapical  pleuroparenchymal scarring/thickening. No definite pneumothorax. No significant pleural effusion. There is similar left chest wall left lower neck subcutaneous emphysema.      No significant interval change. No pneumothorax.  This report was finalized on 12/13/2022 8:43 AM by Enio Garcia MD.      XR Chest 1 View    Result Date: 12/13/2022   DATE OF EXAM: 12/13/2022 4:03 AM  PROCEDURE: XR CHEST 1 VW-  INDICATIONS: Chest tube  COMPARISON: 12/12/2022 and prior  TECHNIQUE: Portable Chest  FINDINGS:  Study is limited by overlying support and monitoring apparatus.  There has been interval removal of the left-sided chest tube. Linear interface is at the left apex are strongly favored represent artifact such as a skinfold. There is slight limitations secondary to subcutaneous emphysema overlying the base of the neck and the chest limiting evaluation.  Pleural-parenchymal changes at the left lung base are noted with consolidation in the left midlung zone and left lung base. Overall this appears to be improved from the comparison. The heart and mediastinal contours are stable in appearance. Right lung is grossly clear. There is evidence of old granulomatous disease. Osseous structures are unremarkable      IMPRESSION :  1. Interval removal left-sided chest tube. 2. Linear interfaces in the left lung are favored to be artifactual. A pneumothorax is not considered likely. 3. Pleural-parenchymal changes with consolidation at the left base with slight interval improvement from prior study.  Recommend a repeat chest radiograph to reevaluate linear interfaces. Findings were called to the nursing station at the time of interpretation.[  This report was finalized on 12/13/2022 7:04 AM by Pablo Thomas.      XR Chest 1 View    Result Date: 12/12/2022  DATE OF EXAM: 12/12/2022 2:50 AM  PROCEDURE: XR CHEST 1 VW-  INDICATIONS: Chest tube  COMPARISON: 12/11/2022  TECHNIQUE: Single radiographic AP view of the chest was obtained.   FINDINGS: There remains a pigtail catheter in left lower chest. There is subcutaneous air along the left lateral chest wall. This extends into the neck. There remains airspace disease in left lower chest. The right lung seems clear. A pneumothorax is not definitely confirmed.      1.  Persistent airspace disease within the left lower chest. 2.  Subcutaneous air along the left lateral chest wall extending into the neck.  This report was finalized on 12/12/2022 7:18 AM by Remington Sabillon MD.      XR Chest 1 View    Result Date: 12/11/2022  DATE OF EXAM: 12/11/2022 2:25 AM  PROCEDURE: XR CHEST 1 VW-  INDICATIONS: Chest tube  COMPARISON: One day prior  TECHNIQUE: Single radiographic AP view of the chest was obtained.  FINDINGS: Small bore left-sided chest tube projects unchanged. Dense opacity in the left midlung field is similar to comparison. There is no significant effusion or distinct pneumothorax. The right lung remains clear. Unchanged heart and mediastinal contours.      Small bore left-sided chest tube projects unchanged. Dense opacity in the left midlung field is similar to comparison. There is no significant effusion or distinct pneumothorax. The right lung remains clear. Unchanged heart and mediastinal contours.  This report was finalized on 12/11/2022 8:17 AM by Rhett Raygoza.      XR Chest 1 View    Result Date: 12/10/2022  DATE OF EXAM: 12/10/2022 5:12 AM  PROCEDURE: XR CHEST 1 VW-  INDICATIONS: Empyema  COMPARISON: 12/9/2022  TECHNIQUE: Single radiographic AP view of the chest was obtained.  FINDINGS: Left pleural catheter is again seen. Mildly decreased loculated left pleural effusion with improved aeration of the left lung base. Unchanged cardiac silhouette. No pneumothorax. No acute osseous abnormalities. Visualized upper abdomen is unremarkable.      Mildly decreased loculated left pleural effusion with improved aeration of the left lung base.  This report was finalized on 12/10/2022 8:53 AM by Mauro  Loraine.      XR Chest 1 View    Result Date: 12/9/2022  EXAMINATION: XR CHEST 1 VW-  DATE OF EXAM: 12/9/2022 2:37 AM  INDICATION: Empyema, pneumonia  COMPARISON: Chest radiograph dated 11/09/2022  TECHNIQUE: Portable AP view of the chest was obtained.  FINDINGS: The cardiac silhouette is within normal limits. There is aortic arch atherosclerotic calcification. Pulmonary vascularity appears normal. There is a smallbore chest tube projecting at the left upper quadrant, likely within the posterior aspect of the thoracic cavity. There is decreased airspace consolidation within the left lung compared to the prior examination. There is no evidence of pneumothorax. There are degenerative changes of the thoracic spine.      1. New small bore right-sided chest tube likely within the posterior left pleural space. Decreased left-sided airspace disease compared to the prior exam.  This report was finalized on 12/9/2022 7:28 AM by Gio Barney MD.      CT Guided Chest Tube    Result Date: 12/8/2022  CT GUIDED ABSCESS DRAIN LUNG-, CT GUIDED CHEST TUBE PLACEMENT-                                                         History: left lung mass; R91.8-Other nonspecific abnormal finding of lung field    : Ollie Segura MD.                                                                            Modality: Computed tomography  Radiation dose for this procedure was 463 mGy-cm.           DOSE REDUCTION: The examination was performed according to departmental dose-optimization program which includes automated exposure control, adjustment of the mA and/or kV according to patient size and/or use of iterative reconstruction technique.                                                                                         SEDATION: Moderate sedation was administered. 2 milligram of Versed and 150 micrograms of fentanyl IV was used for moderate sedation monitored under my direction. Total intra service time of  sedation was 35 minutes. The patient's vital signs were monitored throughout the procedure and recorded in the patient's medical record by the nurse.  Anesthesia: Lidocaine, local infiltration.  Estimated blood loss:  < 5 cc.          Technique: A thorough discussion of the risks, benefits, and alternatives of the procedure, and if applicable, moderate sedation, was carried out with the patient. They were encouraged to ask any questions. Any questions were answered. They verbalized understanding. A written informed consent was then signed.   A multi-component timeout was performed prior to starting the procedure using the departmental protocol.  The procedure room personnel used personal protective equipment. The operators used sterile gloves and if indicated, sterile gowns. The surgical site was prepped with chlorhexidine gluconate  and draped in the maximal applicable sterile fashion.  The patient was positioned supine on the CT table. A preliminary CT was performed to assess the target and determine a safe access site, distance and angle to the target. A left anterior intercostal intercostal access site was selected. The site was sterilely prepped with chlorhexidine gluconate and draped. After local anesthesia infiltration, a dermatotomy was performed.  Using aseptic precautions, under CT guidance, the target lesion was accessed with a 17-gauge guide. The lesion is apparently necrotic on the CT. Therefore I aspirated first and recovered approximately 13 cc of thick yellowish-greenish pus. The specimen was labeled and sent to the lab. The guide was withdrawn and an aseptic dressing applied.  The left pleural effusion seen on 10/26/2022 chest CT has progressed into a large left posterior/lateral loculated fluid collection, most likely an empyema with thick walls. I discussed the case with Dr. Barksdale and we agreed to place a small bore pleural drain. The patient was rolled up on the right side. Using CT guidance,  after local anesthesia and dermatotomy, an 18-gauge Chiba needle was advanced into the collection with recovery of pus. Over a guidewire, an 8.5 Bulgarian locking pigtail catheter was advanced into the empyema, the pigtail locked and the catheter connected to a Pleur-evac. The catheter was secured to skin with nonabsorbable suture and stay fix.  The patient tolerated the procedure well. After recovery, the patient was discharged from the department in stable condition. The agreed-upon plan is to admit the patient to the hospitalist service with follow-up by pulmonary service.           Complications: None immediate. There is replacement of the pus in the left lingular lobe lung abscess by air. The abscess appears much smaller. The pigtail catheter is in good position.  Cores: Number of Cores if obtained: 2  Specimen: The specimen was labeled and sent to the lab in appropriate carriers & containers if such was requested by the ordering provider.                                                               Impression:                                                              Successful CT guided aspiration of a left lung mass that turned out to be a lung abscess, likely related to the history of prior aspiration.  Successful CT-guided placement of an 8.5 Bulgarian drainage catheter in the left empyema.  Thank you for the opportunity to assist in the care of your patient.  This report was finalized on 12/8/2022 11:58 AM by Ollie Segura MD.      CT Guided Abscess Drain Lung    Result Date: 12/8/2022  CT GUIDED ABSCESS DRAIN LUNG-, CT GUIDED CHEST TUBE PLACEMENT-                                                         History: left lung mass; R91.8-Other nonspecific abnormal finding of lung field    : Ollie Segura MD.                                                                            Modality: Computed tomography  Radiation dose for this procedure was 463 mGy-cm.           DOSE REDUCTION: The  examination was performed according to departmental dose-optimization program which includes automated exposure control, adjustment of the mA and/or kV according to patient size and/or use of iterative reconstruction technique.                                                                                         SEDATION: Moderate sedation was administered. 2 milligram of Versed and 150 micrograms of fentanyl IV was used for moderate sedation monitored under my direction. Total intra service time of sedation was 35 minutes. The patient's vital signs were monitored throughout the procedure and recorded in the patient's medical record by the nurse.  Anesthesia: Lidocaine, local infiltration.  Estimated blood loss:  < 5 cc.          Technique: A thorough discussion of the risks, benefits, and alternatives of the procedure, and if applicable, moderate sedation, was carried out with the patient. They were encouraged to ask any questions. Any questions were answered. They verbalized understanding. A written informed consent was then signed.   A multi-component timeout was performed prior to starting the procedure using the departmental protocol.  The procedure room personnel used personal protective equipment. The operators used sterile gloves and if indicated, sterile gowns. The surgical site was prepped with chlorhexidine gluconate  and draped in the maximal applicable sterile fashion.  The patient was positioned supine on the CT table. A preliminary CT was performed to assess the target and determine a safe access site, distance and angle to the target. A left anterior intercostal intercostal access site was selected. The site was sterilely prepped with chlorhexidine gluconate and draped. After local anesthesia infiltration, a dermatotomy was performed.  Using aseptic precautions, under CT guidance, the target lesion was accessed with a 17-gauge guide. The lesion is apparently necrotic on the CT. Therefore I  aspirated first and recovered approximately 13 cc of thick yellowish-greenish pus. The specimen was labeled and sent to the lab. The guide was withdrawn and an aseptic dressing applied.  The left pleural effusion seen on 10/26/2022 chest CT has progressed into a large left posterior/lateral loculated fluid collection, most likely an empyema with thick walls. I discussed the case with Dr. Barksdale and we agreed to place a small bore pleural drain. The patient was rolled up on the right side. Using CT guidance, after local anesthesia and dermatotomy, an 18-gauge Chiba needle was advanced into the collection with recovery of pus. Over a guidewire, an 8.5 Vincentian locking pigtail catheter was advanced into the empyema, the pigtail locked and the catheter connected to a Pleur-evac. The catheter was secured to skin with nonabsorbable suture and stay fix.  The patient tolerated the procedure well. After recovery, the patient was discharged from the department in stable condition. The agreed-upon plan is to admit the patient to the hospitalist service with follow-up by pulmonary service.           Complications: None immediate. There is replacement of the pus in the left lingular lobe lung abscess by air. The abscess appears much smaller. The pigtail catheter is in good position.  Cores: Number of Cores if obtained: 2  Specimen: The specimen was labeled and sent to the lab in appropriate carriers & containers if such was requested by the ordering provider.                                                               Impression:                                                              Successful CT guided aspiration of a left lung mass that turned out to be a lung abscess, likely related to the history of prior aspiration.  Successful CT-guided placement of an 8.5 Vincentian drainage catheter in the left empyema.  Thank you for the opportunity to assist in the care of your patient.  This report was finalized on 12/8/2022 11:58  AM by Ollie Segura MD.                      Pending Labs       Order Current Status    Fungus Culture - Body Fluid, Chest, Left In process    AFB Culture - Body Fluid, Pleural Cavity Preliminary result    Fungus Smear - Body Fluid, Pleural Cavity Edited          Discharge Details        Discharge Medications        New Medications        Instructions Start Date   cefTRIAXone  Commonly known as: ROCEPHIN   2 g, Intravenous, Every 24 Hours   Start Date: December 14, 2022     fluconazole 200 MG tablet  Commonly known as: DIFLUCAN   400 mg, Oral, Every 24 Hours             Changes to Medications        Instructions Start Date   metoprolol succinate  MG 24 hr tablet  Commonly known as: TOPROL-XL  What changed: how to take this   TAKE 1 TABLET BY MOUTH EVERY DAY      simvastatin 40 MG tablet  Commonly known as: ZOCOR  What changed: when to take this   TAKE 1 TABLET BY MOUTH EVERY DAY AT NIGHT             Continue These Medications        Instructions Start Date   benzonatate 100 MG capsule  Commonly known as: TESSALON   100 mg, Oral, 3 Times Daily PRN      finasteride 5 MG tablet  Commonly known as: PROSCAR   5 mg, Oral, Daily      hydroCHLOROthiazide 25 MG tablet  Commonly known as: HYDRODIURIL   25 mg, Oral, Daily      quinapril 40 MG tablet  Commonly known as: ACCUPRIL   TAKE 1 TABLET BY MOUTH EVERY DAY      traMADol 50 MG tablet  Commonly known as: ULTRAM   50 mg, Oral, Every 6 Hours PRN               Allergies   Allergen Reactions   • Penicillins Diarrhea   • Cardizem [Diltiazem Hcl] Rash   • Sulfa Antibiotics Rash         Discharge Disposition:  Home or Self Care    Diet:  Hospital:  Diet Order   Procedures   • Diet: Regular/House Diet; Texture: Regular Texture (IDDSI 7); Fluid Consistency: Thin (IDDSI 0)       Activity:  Activity Instructions       Activity as Tolerated              Restrictions or Other Recommendations:         CODE STATUS:    Code Status and Medical Interventions:   Ordered at: 12/08/22  1258     Code Status (Patient has no pulse and is not breathing):    CPR (Attempt to Resuscitate)     Medical Interventions (Patient has pulse or is breathing):    Full Support       No future appointments.    Additional Instructions for the Follow-ups that You Need to Schedule       Discharge Follow-up with PCP   As directed       Currently Documented PCP:    Elieser Delvalle MD    PCP Phone Number:    [phone]     Follow Up Details: within one week         Discharge Follow-up with Specified Provider: id tomorrow at 9:30 AM for continuation of IV antibiotics.   As directed      To: id tomorrow at 9:30 AM for continuation of IV antibiotics.                       Shamar Muñoz MD  12/13/22      Time Spent on Discharge:  I spent  35 minutes on this discharge activity which included: face-to-face encounter with the patient, reviewing the data in the system, coordination of the care with the nursing staff as well as consultants, documentation, and entering orders.

## 2022-12-13 NOTE — PLAN OF CARE
Goal Outcome Evaluation:    Patient is on room air.  Runs SR-ST on monitor.  Very little drainage, which has been unchanged since the beginning of shift, on dressing.  No complaints of pain, just tenderness.

## 2022-12-13 NOTE — PROGRESS NOTES
Pulmonary Hospital Follow-up     Hospital:  LOS: 5 days   Mr. Rhett Isaacs, 72 y.o. male is followed for:   Bronchial pneumonia            History of present illness:     72-year-old male with past medical history of BPH, hypertension and dyslipidemia.  Patient presented to hospital with left lingular pneumonia and initially underwent bronchoscopy without any endobronchial lesions noted.  However patient continued to have symptoms of fatigue, poor appetite and weight loss and has lost about 36 pounds of weight in about 2 months timeframe.  Patient subsequently was referred to IR for CT-guided biopsy.  During that biopsy in the left upper lobe there was greenish-yellow purulent material was suctioned and on CT scan review seems like there was worsening of left lower lobe effusion loculated component as well so pleural drain was placed and patient was admitted to the hospital.  Subsequently patient has required multiple tPA and dornase alpha installations with improvement in pleural effusion.  Repeat CT scan done showed significant improvement in lower lobe drainage without any significant effusion left.  There is subcutaneous emphysema noted chest wall tracking all the way up in the neck area.  Looks like subcutaneous emphysema has been persisting for past couple of days on chest x-ray as well.  Chest tube was discontinued on 12/12.    Subjective   Interval History:  Overnight no acute events. No complaints of chest pain. No new fevers. Wanting to go home and just got a PICC line as well.                The patient's past medical, surgical and social history were reviewed and updated in Epic as appropriate.       Objective     Infusions:     Medications:  atorvastatin, 20 mg, Oral, Daily  cefTRIAXone, 2 g, Intravenous, Q24H  finasteride, 5 mg, Oral, Daily  fluconazole, 400 mg, Oral, Q24H  lisinopril, 40 mg, Oral, Q24H  metoprolol succinate XL, 100 mg, Oral, Daily  sodium chloride, 10 mL, Intravenous,  "Q12H  sodium chloride, 10 mL, Intravenous, Q12H        Vital Sign Min/Max for last 24 hours  Temp  Min: 97 °F (36.1 °C)  Max: 98 °F (36.7 °C)   BP  Min: 106/71  Max: 126/80   Pulse  Min: 79  Max: 98   Resp  Min: 16  Max: 16   SpO2  Min: 95 %  Max: 97 %   No data recorded       Input/Output for last 24 hour shift  12/12 0701 - 12/13 0700  In: 1050 [P.O.:1050]  Out: 400 [Urine:400]      Objective:  Vital signs: (most recent): Blood pressure 126/80, pulse 82, temperature 97.5 °F (36.4 °C), temperature source Oral, resp. rate 16, height 175.3 cm (69.02\"), weight 64.9 kg (143 lb), SpO2 96 %.            General Appearance: Awake, alert, in no acute distress  Lungs:   B/L Breath sounds present with decreased breath sounds on bases, no wheezing heard, occ crackles.  Heart: S1 and S2 present, no murmur  Extremities:  no cyanosis or clubbing,  no edema, warm to touch.  Neurologic:  Moving all four extremities. Good strength bilaterally.   Psychological: Normal affect, Cooperative    Results from last 7 days   Lab Units 12/12/22  0850 12/11/22  0456 12/10/22  0641   WBC 10*3/mm3 8.08 8.85 8.36   HEMOGLOBIN g/dL 10.0* 9.1* 8.6*   PLATELETS 10*3/mm3 453* 406 386     Results from last 7 days   Lab Units 12/12/22  0850 12/11/22  0456 12/10/22  0641   SODIUM mmol/L 133* 131* 131*   POTASSIUM mmol/L 4.0 4.1 3.9   CO2 mmol/L 27.0 26.0 29.0   BUN mg/dL 11 13 8   CREATININE mg/dL 0.63* 0.52* 0.61*   MAGNESIUM mg/dL 2.0  --   --    PHOSPHORUS mg/dL 3.0  --   --    GLUCOSE mg/dL 142* 110* 137*     Estimated Creatinine Clearance: 95.9 mL/min (A) (by C-G formula based on SCr of 0.63 mg/dL (L)).          Images:   CXR reviewed and no significant change in left lowe lung opacity. SQ emphysema improving.     I reviewed the patient's results and images.     Assessment & Plan   Impression        Bronchial pneumonia    Benign hypertrophy of prostate    Mixed hyperlipidemia    Essential hypertension, benign    Chest tube in place    Weight loss   "  Empyema lung (HCC)    Severe malnutrition (HCC)       Plan        1.  Patient presenting here with left-sided empyema with loculated pleural effusion.  Left lower lobe pleural effusion has been completely drained out. Chest tube was discontinued yesterday. Discussed with interventional radiology and they can not offer to aspirate it further. Discussed with ID team. I think current plan will be to continue outpatient antibiotics per ID and have follow up imaging in 2-3 weeks and effusions have not completely resolved then will need decortication. Pt and his wife are agreeable with this plan. Pt states that he is feeling better overall. CRP improving as well.     2.  Subcutaneous emphysema after chest tube placement.  Improving slowly. No worsening noted.     3. Continue IS and flutter as needed.     4. Encouraged oral intake.       Omari Jeffers MD, FCCP  Pulmonary, Critical care and Sleep Medicine

## 2022-12-13 NOTE — PROGRESS NOTES
"Pharmacy Consult-Vancomycin Dosing  Rhett Isaacs is a  72 y.o. male receiving vancomycin therapy.     Indication: Empyema   Consulting Provider: Dr. Underwood   ID Consult: Yes     Goal AUC: 400 - 600 mg/L*hr    Current Antimicrobial Therapy  Anti-Infectives (From admission, onward)      Ordered     Dose/Rate Route Frequency Start Stop    12/12/22 2103  Pharmacy to dose vancomycin        Ordering Provider: George Underwood MD     Does not apply Continuous PRN 12/12/22 2102 12/22/22 2101 12/12/22 2004  fluconazole (DIFLUCAN) tablet 400 mg        Ordering Provider: George Underwood MD    400 mg Oral Every 24 Hours 12/12/22 2100 01/11/23 2059 12/12/22 2022  ertapenem (INVanz) 1 g/100 mL 0.9% NS VTB (mbp)        Ordering Provider: George Underwood MD    1 g  over 30 Minutes Intravenous Every 24 Hours 12/12/22 2100 01/11/23 2059 12/08/22 1335  piperacillin-tazobactam (ZOSYN) 4.5 g in iso-osmotic dextrose 100 mL IVPB (premix)        Ordering Provider: Jeremias Hughes MD    4.5 g  over 30 Minutes Intravenous Once 12/08/22 1430 12/08/22 1606            Allergies  Allergies as of 12/08/2022 - Reviewed 12/08/2022   Allergen Reaction Noted    Penicillins Diarrhea 09/13/2016    Cardizem [diltiazem hcl] Rash 09/13/2016    Sulfa antibiotics Rash 09/13/2016       Labs    Results from last 7 days   Lab Units 12/12/22  0850 12/11/22  0456 12/10/22  0641   BUN mg/dL 11 13 8   CREATININE mg/dL 0.63* 0.52* 0.61*       Results from last 7 days   Lab Units 12/12/22  0850 12/11/22  0456 12/10/22  0641   WBC 10*3/mm3 8.08 8.85 8.36       Evaluation of Dosing     Last Dose Received in the ED/Outside Facility: N/A  Is Patient on Dialysis or Renal Replacement: N/A    Ht - 175.3 cm (69.02\")  Wt - 64.9 kg (143 lb)    Estimated Creatinine Clearance: 95.9 mL/min (A) (by C-G formula based on SCr of 0.63 mg/dL (L)).    Intake & Output (last 3 days)         12/10 0701  12/11 0700 12/11 0701  12/12 0700 12/12 0701  12/13 0700 "    P.O. 1050 1987 1050    IV Piggyback       Total Intake(mL/kg) 1050 (16.2) 1987 (30.6) 1050 (16.2)    Urine (mL/kg/hr) 1100 (0.7) 325 (0.2)     Drains 30 30     Stool  0     Total Output 1130 355     Net -80 +1632 +1050           Urine Unmeasured Occurrence  2 x     Stool Unmeasured Occurrence  2 x             Microbiology and Radiology  Microbiology Results (last 10 days)       Procedure Component Value - Date/Time    MRSA Screen, PCR (Inpatient) - Swab, Nares [[id number]]  (Normal) Collected: 12/09/22 1209    Lab Status: Final result Specimen: Swab from Nares Updated: 12/09/22 1424     MRSA PCR Negative    Narrative:      The negative predictive value of this diagnostic test is high and should only be used to consider de-escalating anti-MRSA therapy. A positive result may indicate colonization with MRSA and must be correlated clinically.  MRSA Negative    Body Fluid Culture - Body Fluid, Lung, Left Lower Lobe [[id number]]  (Abnormal) Collected: 12/08/22 1143    Lab Status: Final result Specimen: Body Fluid from Lung, Left Lower Lobe Updated: 12/12/22 0943     Body Fluid Culture Moderate growth (3+) Streptococcus intermedius     Gram Stain Many (4+) WBCs seen      No organisms seen    Narrative:      Refer to previous wound culture collected on 12/08/2022 1138 for MICs    Anaerobic Culture - Body Fluid, Lung, Left Lower Lobe [[id number]]  (Normal) Collected: 12/08/22 1143    Lab Status: Preliminary result Specimen: Body Fluid from Lung, Left Lower Lobe Updated: 12/11/22 0820     Anaerobic Culture No anaerobes isolated at 3 days    AFB Culture - Body Fluid, Pleural Cavity [[id number]] Collected: 12/08/22 1143    Lab Status: Preliminary result Specimen: Body Fluid from Pleural Cavity Updated: 12/10/22 1134     AFB Stain No acid fast bacilli seen on concentrated smear    Fungus Smear - Body Fluid, Pleural Cavity [[id number]]  (Abnormal) Collected: 12/08/22 1143    Lab Status: Final result Specimen: Body Fluid from  Pleural Cavity Updated: 12/12/22 1357     Fungal Stain Moderate (3+) Yeast     Comment: Smear read by pathologist        Body Fluid Culture - Body Fluid, Pleural Cavity [[id number]]  (Abnormal)  (Susceptibility) Collected: 12/08/22 1138    Lab Status: Final result Specimen: Body Fluid from Pleural Cavity Updated: 12/12/22 0943     Body Fluid Culture Moderate growth (3+) Streptococcus intermedius     Gram Stain Moderate (3+) WBCs seen      Moderate (3+) Gram positive cocci in pairs and clusters    Susceptibility        Streptococcus intermedius      FARHEEN Not Specified      Ceftriaxone  Resistant      Penicillin G  Resistant      Vancomycin Susceptible                              Anaerobic Culture - Body Fluid, Lung, Left Lower Lobe [[id number]]  (Normal) Collected: 12/08/22 1138    Lab Status: Preliminary result Specimen: Body Fluid from Lung, Left Lower Lobe Updated: 12/11/22 0818     Anaerobic Culture No anaerobes isolated at 3 days            Reported Vancomycin Levels                         Insight[facility] AUC Calculation:    Current AUC: -- mg/L*hr    Predicted Steady State AUC on Current Dose: -- mg/L*hr  _________________________________    Predicted Steady State AUC on New Dose: 528 mg/L*hr    Assessment/Plan:    1. Pharmacy to dose vancomycin for empyema. Goal -600 mg/L*hr.  2. Will give patient a loading dose of vancomycin 1250mg (~20 mg/kg) IV on 12/12 @ 2200 and initiate a maintenance dose of vancomycin 1,000 mg  IV Q12hr to start on 12/13 @ 1000.  3. Assess clearance by vancomycin level on 12/14 @ 0600, prior to 4th overall dose.  4. Pharmacy will continue to monitor renal function, cultures and sensitivities, and clinical status to adjust regimen as necessary.    Anna Davis, Memo  12/12/2022   21:14 EST

## 2022-12-14 ENCOUNTER — TRANSITIONAL CARE MANAGEMENT TELEPHONE ENCOUNTER (OUTPATIENT)
Dept: CALL CENTER | Facility: HOSPITAL | Age: 72
End: 2022-12-14

## 2022-12-14 LAB
BACTERIA FLD CULT: ABNORMAL
GIE STN SPEC: NORMAL
GRAM STN SPEC: ABNORMAL
GRAM STN SPEC: ABNORMAL

## 2022-12-14 NOTE — OUTREACH NOTE
Prep Survey    Flowsheet Row Responses   [facility] patient discharged from? [place]   Is LACE score < 7 ? No   Eligibility [facility]   Date of Admission 12/08/22   Date of Discharge 12/13/22   Discharge Disposition Home or Self Care   Discharge diagnosis LT lung lesion,   Loculated LT pleural effusion s/p aspiration and chest tube,    Lipoid pneumonia   Does the patient have one of the following disease processes/diagnoses(primary or secondary)? Pneumonia   Does the patient have Home health ordered? No   Is there a DME ordered? No   Prep survey completed? Yes          DINA DELCID - Registered Nurse

## 2022-12-14 NOTE — OUTREACH NOTE
Call Center TCM Note    Flowsheet Row Responses   [facility] patient discharged from? [place]   Does the patient have one of the following disease processes/diagnoses(primary or secondary)? Pneumonia   TCM attempt successful? Yes   Call start time 1427   Call end time 1436   Discharge diagnosis LT lung lesion,   Loculated LT pleural effusion s/p aspiration and chest tube,    Lipoid pneumonia   Meds reviewed with patient/caregiver? Yes   Is the patient having any side effects they believe may be caused by any medication additions or changes? No   Does the patient have all medications ordered at discharge? Yes   Prescription comments Pt spoke with ID today (12/14/22) - pt is receiving IV antibiotics - ID is following    Is the patient taking all medications as directed (includes completed medication regime)? Yes   Does the patient have an appointment with their PCP within 7 days of discharge? No appointments available   Nursing Interventions Routed TCM call to PCP office, PCP office requested to make appointment - message sent   Has home health visited the patient within 72 hours of discharge? N/A   Pulse Ox monitoring Intermittent   Pulse Ox device source Patient   O2 Sat comments 97% RA    O2 Sat: education provided Sat levels, Monitoring frequency, When to seek care   Did the patient receive a copy of their discharge instructions? Yes   Nursing interventions Reviewed instructions with patient   What is the patient's perception of their health status since discharge? Improving   If the patient is a current smoker, are they able to teach back resources for cessation? Not a smoker   Is the patient/caregiver able to teach back the hierarchy of who to call/visit for symptoms/problems? PCP, Specialist, Home health nurse, Urgent Care, ED, 911 Yes   Is the patient/caregiver able to teach back signs and symptoms of worsening condition: Fever/chills, Shortness of breath, Chest pain   Is the patient/caregiver able to  teach back importance of completing antibiotic course of treatment? Yes   TCM call completed? Yes   Call end time 1718          Loida Chauhan RN    12/14/2022, 14:37 EST

## 2022-12-18 ENCOUNTER — LAB (OUTPATIENT)
Dept: LAB | Facility: HOSPITAL | Age: 72
End: 2022-12-18

## 2022-12-18 ENCOUNTER — TRANSCRIBE ORDERS (OUTPATIENT)
Dept: LAB | Facility: HOSPITAL | Age: 72
End: 2022-12-18

## 2022-12-21 LAB
FUNGUS WND CULT: NORMAL
MYCOBACTERIUM SPEC CULT: NORMAL
NIGHT BLUE STAIN TISS: NORMAL

## 2022-12-23 ENCOUNTER — READMISSION MANAGEMENT (OUTPATIENT)
Dept: CALL CENTER | Facility: HOSPITAL | Age: 72
End: 2022-12-23

## 2022-12-23 DIAGNOSIS — J90 PLEURAL EFFUSION, LEFT: Primary | ICD-10-CM

## 2022-12-23 NOTE — OUTREACH NOTE
COPD/PN Week 2 Survey    Flowsheet Row Responses   [facility] patient discharged from? [place]   Does the patient have one of the following disease processes/diagnoses(primary or secondary)? Pneumonia   Week 2 attempt successful? Yes   Call start time 1022   Call end time 1028   List who call center can speak with pt   Meds reviewed with patient/caregiver? Yes   Comments regarding appointments Pt did see pcp on December 22, 2022.   Does the patient have a primary care provider?  Yes   Does the patient have an appointment with their PCP or specialist within 7 days of discharge? Yes   Has the patient kept scheduled appointments due by today? Yes   Pulse Ox monitoring Intermittent   Pulse Ox device source Patient   O2 Sat comments Pt reports 99% on ra.   What is the patient's perception of their health status since discharge? Improving   Week 2 call completed? Yes   Wrap up additional comments Pt reports improving. Pt has followed up with pcp. Pt did cancel infusion on this day due to the weather. Pt reports appetit improving as well.          GARDENIA DELCID - Registered Nurse

## 2022-12-25 ENCOUNTER — LAB (OUTPATIENT)
Dept: LAB | Facility: HOSPITAL | Age: 72
End: 2022-12-25

## 2022-12-25 ENCOUNTER — TRANSCRIBE ORDERS (OUTPATIENT)
Dept: LAB | Facility: HOSPITAL | Age: 72
End: 2022-12-25

## 2022-12-25 DIAGNOSIS — J69.0 PNEUMONITIS DUE TO INHALATION OF FOOD OR VOMITUS: Primary | ICD-10-CM

## 2022-12-25 DIAGNOSIS — J86.9 EMPYEMA OF PLEURA: ICD-10-CM

## 2022-12-25 DIAGNOSIS — B95.4 BACTERIAL INFECTION DUE TO STREPTOCOCCUS, GROUP G: ICD-10-CM

## 2023-01-01 ENCOUNTER — TRANSCRIBE ORDERS (OUTPATIENT)
Dept: LAB | Facility: HOSPITAL | Age: 73
End: 2023-01-01
Payer: MEDICARE

## 2023-01-01 ENCOUNTER — LAB (OUTPATIENT)
Dept: LAB | Facility: HOSPITAL | Age: 73
End: 2023-01-01
Payer: MEDICARE

## 2023-01-01 DIAGNOSIS — B95.4 BACTERIAL INFECTION DUE TO STREPTOCOCCUS, GROUP G: ICD-10-CM

## 2023-01-01 DIAGNOSIS — J86.9 EMPYEMA OF PLEURA: ICD-10-CM

## 2023-01-01 DIAGNOSIS — J69.0 PNEUMONITIS DUE TO INHALATION OF FOOD OR VOMITUS: Primary | ICD-10-CM

## 2023-01-01 DIAGNOSIS — J69.0 PNEUMONITIS DUE TO INHALATION OF FOOD OR VOMITUS: ICD-10-CM

## 2023-01-01 LAB
ALBUMIN SERPL-MCNC: 3.6 G/DL (ref 3.5–5.2)
ALBUMIN/GLOB SERPL: 0.9 G/DL
ALP SERPL-CCNC: 95 U/L (ref 39–117)
ALT SERPL W P-5'-P-CCNC: 7 U/L (ref 1–41)
ANION GAP SERPL CALCULATED.3IONS-SCNC: 7 MMOL/L (ref 5–15)
AST SERPL-CCNC: 11 U/L (ref 1–40)
BASOPHILS # BLD AUTO: 0.02 10*3/MM3 (ref 0–0.2)
BASOPHILS NFR BLD AUTO: 0.5 % (ref 0–1.5)
BILIRUB SERPL-MCNC: 0.3 MG/DL (ref 0–1.2)
BUN SERPL-MCNC: 9 MG/DL (ref 8–23)
BUN/CREAT SERPL: 12.7 (ref 7–25)
CALCIUM SPEC-SCNC: 9.4 MG/DL (ref 8.6–10.5)
CHLORIDE SERPL-SCNC: 100 MMOL/L (ref 98–107)
CO2 SERPL-SCNC: 29 MMOL/L (ref 22–29)
CREAT SERPL-MCNC: 0.71 MG/DL (ref 0.76–1.27)
CRP SERPL-MCNC: 0.55 MG/DL (ref 0–0.5)
DEPRECATED RDW RBC AUTO: 65 FL (ref 37–54)
EGFRCR SERPLBLD CKD-EPI 2021: 96.9 ML/MIN/1.73
EOSINOPHIL # BLD AUTO: 0.28 10*3/MM3 (ref 0–0.4)
EOSINOPHIL NFR BLD AUTO: 6.5 % (ref 0.3–6.2)
ERYTHROCYTE [DISTWIDTH] IN BLOOD BY AUTOMATED COUNT: 21 % (ref 12.3–15.4)
ERYTHROCYTE [SEDIMENTATION RATE] IN BLOOD: 88 MM/HR (ref 0–20)
GLOBULIN UR ELPH-MCNC: 4 GM/DL
GLUCOSE SERPL-MCNC: 94 MG/DL (ref 65–99)
HCT VFR BLD AUTO: 36.4 % (ref 37.5–51)
HGB BLD-MCNC: 11.2 G/DL (ref 13–17.7)
IMM GRANULOCYTES # BLD AUTO: 0.27 10*3/MM3 (ref 0–0.05)
IMM GRANULOCYTES NFR BLD AUTO: 6.3 % (ref 0–0.5)
LYMPHOCYTES # BLD AUTO: 1.47 10*3/MM3 (ref 0.7–3.1)
LYMPHOCYTES NFR BLD AUTO: 34.3 % (ref 19.6–45.3)
MCH RBC QN AUTO: 26.5 PG (ref 26.6–33)
MCHC RBC AUTO-ENTMCNC: 30.8 G/DL (ref 31.5–35.7)
MCV RBC AUTO: 86.3 FL (ref 79–97)
MONOCYTES # BLD AUTO: 0.65 10*3/MM3 (ref 0.1–0.9)
MONOCYTES NFR BLD AUTO: 15.2 % (ref 5–12)
NEUTROPHILS NFR BLD AUTO: 1.59 10*3/MM3 (ref 1.7–7)
NEUTROPHILS NFR BLD AUTO: 37.2 % (ref 42.7–76)
NRBC BLD AUTO-RTO: 0 /100 WBC (ref 0–0.2)
PLAT MORPH BLD: NORMAL
PLATELET # BLD AUTO: 205 10*3/MM3 (ref 140–450)
PMV BLD AUTO: 9.7 FL (ref 6–12)
POTASSIUM SERPL-SCNC: 4.1 MMOL/L (ref 3.5–5.2)
PROT SERPL-MCNC: 7.6 G/DL (ref 6–8.5)
RBC # BLD AUTO: 4.22 10*6/MM3 (ref 4.14–5.8)
RBC MORPH BLD: NORMAL
SODIUM SERPL-SCNC: 136 MMOL/L (ref 136–145)
WBC MORPH BLD: NORMAL
WBC NRBC COR # BLD: 4.28 10*3/MM3 (ref 3.4–10.8)

## 2023-01-01 PROCEDURE — 80053 COMPREHEN METABOLIC PANEL: CPT

## 2023-01-01 PROCEDURE — 85007 BL SMEAR W/DIFF WBC COUNT: CPT

## 2023-01-01 PROCEDURE — 85025 COMPLETE CBC W/AUTO DIFF WBC: CPT

## 2023-01-01 PROCEDURE — 86140 C-REACTIVE PROTEIN: CPT

## 2023-01-01 PROCEDURE — 85652 RBC SED RATE AUTOMATED: CPT

## 2023-01-01 PROCEDURE — 36415 COLL VENOUS BLD VENIPUNCTURE: CPT

## 2023-01-09 ENCOUNTER — APPOINTMENT (OUTPATIENT)
Dept: CT IMAGING | Facility: HOSPITAL | Age: 73
End: 2023-01-09

## 2023-01-09 ENCOUNTER — TRANSCRIBE ORDERS (OUTPATIENT)
Dept: LAB | Facility: HOSPITAL | Age: 73
End: 2023-01-09
Payer: MEDICARE

## 2023-01-09 DIAGNOSIS — B95.4 BACTERIAL INFECTION DUE TO STREPTOCOCCUS, GROUP G: ICD-10-CM

## 2023-01-09 DIAGNOSIS — J86.9 EMPYEMA OF PLEURA: ICD-10-CM

## 2023-01-09 DIAGNOSIS — J69.0 PNEUMONITIS DUE TO INHALATION OF FOOD OR VOMITUS: Primary | ICD-10-CM

## 2023-01-18 ENCOUNTER — HOSPITAL ENCOUNTER (OUTPATIENT)
Dept: CT IMAGING | Facility: HOSPITAL | Age: 73
Discharge: HOME | End: 2023-01-18
Admitting: INTERNAL MEDICINE
Payer: MEDICARE

## 2023-01-18 DIAGNOSIS — J90 PLEURAL EFFUSION, LEFT: ICD-10-CM

## 2023-01-18 PROCEDURE — 25010000002 IOPAMIDOL 61 % SOLUTION: Performed by: INTERNAL MEDICINE

## 2023-01-18 PROCEDURE — 71260 CT THORAX DX C+: CPT

## 2023-01-18 RX ADMIN — IOPAMIDOL 80 ML: 612 INJECTION, SOLUTION INTRAVENOUS at 10:24

## 2023-01-20 ENCOUNTER — DOCUMENTATION (OUTPATIENT)
Dept: PULMONOLOGY | Facility: CLINIC | Age: 73
End: 2023-01-20
Payer: MEDICARE

## 2023-01-20 LAB
FUNGUS WND CULT: NORMAL
MYCOBACTERIUM SPEC CULT: NORMAL
NIGHT BLUE STAIN TISS: NORMAL

## 2023-01-20 NOTE — PROGRESS NOTES
Chest CT scan reviewed and compared with older films from December.  Chest CT scan has shown significant improvement in the left pleural effusion.  Mild pleural thickening present.  Previously noted patchy consolidation in left lung have all resolved.  Lymph nodes borderline.  No other untoward findings noted.  Patient is still continuing to follow with infectious disease and has been on oral antibiotics now.  Reviewed with him that CT scan shows significant improvement.  He was worried that he will need surgery but I do not see an indication for that.  Advised that will be around if he needs me from any pulmonary reasons otherwise he will continue follow-up with primary care and infectious disease for now.  He had no further questions.

## 2023-02-20 ENCOUNTER — TRANSCRIBE ORDERS (OUTPATIENT)
Dept: LAB | Facility: HOSPITAL | Age: 73
End: 2023-02-20
Payer: MEDICARE

## 2023-02-20 ENCOUNTER — LAB (OUTPATIENT)
Dept: LAB | Facility: HOSPITAL | Age: 73
End: 2023-02-20
Payer: MEDICARE

## 2023-02-20 DIAGNOSIS — R19.7 DIARRHEA OF PRESUMED INFECTIOUS ORIGIN: Primary | ICD-10-CM

## 2023-02-20 LAB
C DIFF GDH + TOXINS A+B STL QL IA.RAPID: POSITIVE
C DIFF TOX GENS STL QL NAA+PROBE: DETECTED

## 2023-02-20 PROCEDURE — 87493 C DIFF AMPLIFIED PROBE: CPT

## 2023-02-20 PROCEDURE — 87449 NOS EACH ORGANISM AG IA: CPT

## 2023-02-21 ENCOUNTER — TELEPHONE (OUTPATIENT)
Dept: FAMILY MEDICINE CLINIC | Facility: CLINIC | Age: 73
End: 2023-02-21

## 2023-02-21 RX ORDER — BENAZEPRIL HYDROCHLORIDE 10 MG/1
10 TABLET ORAL DAILY
Qty: 90 TABLET | Refills: 3 | Status: SHIPPED | OUTPATIENT
Start: 2023-02-21 | End: 2023-03-14 | Stop reason: SDUPTHER

## 2023-03-14 RX ORDER — BENAZEPRIL HYDROCHLORIDE 10 MG/1
10 TABLET ORAL DAILY
Qty: 90 TABLET | Refills: 3 | Status: SHIPPED | OUTPATIENT
Start: 2023-03-14 | End: 2023-04-03 | Stop reason: SDUPTHER

## 2023-04-03 RX ORDER — BENAZEPRIL HYDROCHLORIDE 10 MG/1
10 TABLET ORAL DAILY
Qty: 90 TABLET | Refills: 3 | Status: SHIPPED | OUTPATIENT
Start: 2023-04-03

## 2023-08-17 RX ORDER — SODIUM, POTASSIUM,MAG SULFATES 17.5-3.13G
SOLUTION, RECONSTITUTED, ORAL ORAL
Qty: 354 ML | Refills: 0 | Status: SHIPPED | OUTPATIENT
Start: 2023-08-17

## 2023-08-31 ENCOUNTER — OUTSIDE FACILITY SERVICE (OUTPATIENT)
Dept: GASTROENTEROLOGY | Facility: CLINIC | Age: 73
End: 2023-08-31
Payer: MEDICARE

## 2023-10-01 DIAGNOSIS — E78.5 HYPERLIPIDEMIA, UNSPECIFIED HYPERLIPIDEMIA TYPE: ICD-10-CM

## 2023-10-01 DIAGNOSIS — N40.1 BENIGN NON-NODULAR PROSTATIC HYPERPLASIA WITH LOWER URINARY TRACT SYMPTOMS: ICD-10-CM

## 2023-10-01 DIAGNOSIS — I10 ESSENTIAL HYPERTENSION: ICD-10-CM

## 2023-10-02 DIAGNOSIS — I10 ESSENTIAL HYPERTENSION: ICD-10-CM

## 2023-10-02 RX ORDER — SIMVASTATIN 40 MG
40 TABLET ORAL NIGHTLY
Qty: 90 TABLET | Refills: 3 | Status: SHIPPED | OUTPATIENT
Start: 2023-10-02

## 2023-10-02 RX ORDER — FINASTERIDE 5 MG/1
TABLET, FILM COATED ORAL
Qty: 90 TABLET | Refills: 3 | Status: SHIPPED | OUTPATIENT
Start: 2023-10-02

## 2023-10-02 RX ORDER — HYDROCHLOROTHIAZIDE 25 MG/1
TABLET ORAL
Qty: 90 TABLET | Refills: 3 | Status: SHIPPED | OUTPATIENT
Start: 2023-10-02

## 2023-10-02 RX ORDER — METOPROLOL SUCCINATE 100 MG/1
TABLET, EXTENDED RELEASE ORAL
Qty: 90 TABLET | Refills: 3 | Status: SHIPPED | OUTPATIENT
Start: 2023-10-02

## 2023-10-04 ENCOUNTER — LAB (OUTPATIENT)
Dept: FAMILY MEDICINE CLINIC | Facility: CLINIC | Age: 73
End: 2023-10-04
Payer: MEDICARE

## 2023-10-04 ENCOUNTER — OFFICE VISIT (OUTPATIENT)
Dept: FAMILY MEDICINE CLINIC | Facility: CLINIC | Age: 73
End: 2023-10-04
Payer: MEDICARE

## 2023-10-04 VITALS
WEIGHT: 162.2 LBS | DIASTOLIC BLOOD PRESSURE: 74 MMHG | SYSTOLIC BLOOD PRESSURE: 118 MMHG | BODY MASS INDEX: 24.02 KG/M2 | HEIGHT: 69 IN | HEART RATE: 63 BPM | OXYGEN SATURATION: 98 % | TEMPERATURE: 97.3 F | RESPIRATION RATE: 14 BRPM

## 2023-10-04 DIAGNOSIS — Z00.00 ENCOUNTER FOR SUBSEQUENT ANNUAL WELLNESS VISIT (AWV) IN MEDICARE PATIENT: Primary | ICD-10-CM

## 2023-10-04 DIAGNOSIS — Z00.00 ROUTINE GENERAL MEDICAL EXAMINATION AT A HEALTH CARE FACILITY: ICD-10-CM

## 2023-10-04 DIAGNOSIS — N13.8 BPH WITH OBSTRUCTION/LOWER URINARY TRACT SYMPTOMS: ICD-10-CM

## 2023-10-04 DIAGNOSIS — N40.1 BPH WITH OBSTRUCTION/LOWER URINARY TRACT SYMPTOMS: ICD-10-CM

## 2023-10-04 LAB
POC CREATININE URINE: 4.4
POC MICROALBUMIN URINE: 30

## 2023-10-04 PROCEDURE — 36415 COLL VENOUS BLD VENIPUNCTURE: CPT | Performed by: PHYSICIAN ASSISTANT

## 2023-10-04 NOTE — PROGRESS NOTES
The ABCs of the Annual Wellness Visit  Subsequent Medicare Wellness Visit    Chief Complaint   Patient presents with    Medicare Wellness-subsequent     UTD on colonoscopy, vaccines.      Subjective   History of Present Illness:  Rhett Isaacs is a 73 y.o. male who presents for a Subsequent Medicare Wellness Visit.    The following portions of the patient's history were reviewed and   updated as appropriate: allergies, current medications, past family history, past medical history, past social history, past surgical history, and problem list.    Compared to one year ago, the patient feels his physical   health is better.    Compared to one year ago, the patient feels his mental   health is better.    Recent Hospitalizations:  This patient has had a [facility] admission record on file within the last 365 days.    Current Medical Providers:  Patient Care Team:  Elieser Delvalle MD as PCP - General (Family Medicine)  Salvatore Torres MD as Consulting Physician (Gastroenterology)  Jeremias Hughes MD as Consulting Physician (Pulmonary Disease)    Outpatient Medications Prior to Visit   Medication Sig Dispense Refill    benazepril (Lotensin) 10 MG tablet Take 1 tablet by mouth Daily. 90 tablet 3    finasteride (PROSCAR) 5 MG tablet TAKE 1 TABLET BY MOUTH EVERY DAY 90 tablet 3    hydroCHLOROthiazide (HYDRODIURIL) 25 MG tablet TAKE 1 TABLET BY MOUTH EVERY DAY 90 tablet 3    lansoprazole (PREVACID SOLUTAB) 15 MG Tablet Delayed Release Dispersible disintegrating tablet Take 1 tablet by mouth Daily.      metoprolol succinate XL (TOPROL-XL) 100 MG 24 hr tablet TAKE 1 TABLET BY MOUTH EVERY DAY 90 tablet 3    simvastatin (ZOCOR) 40 MG tablet Take 1 tablet by mouth Every Night. 90 tablet 3    traMADol (ULTRAM) 50 MG tablet Take 1 tablet by mouth Every 6 (Six) Hours As Needed for Moderate Pain. 40 tablet 1    benzonatate (TESSALON) 100 MG capsule Take 1 capsule by mouth 3 (Three) Times a Day As Needed  "for Cough. 42 capsule 3    sodium-potassium-magnesium sulfates (Suprep Bowel Prep Kit) 17.5-3.13-1.6 GM/177ML solution oral solution Use as directed by provider for colonoscopy prep 354 mL 0     No facility-administered medications prior to visit.       Opioid medication/s are on active medication list.  and I have evaluated his active treatment plan and pain score trends (see table).  Vitals:    10/04/23 1438   PainSc: 0-No pain     I have reviewed the chart for potential of high risk medication and harmful drug interactions in the elderly.          Aspirin is not on active medication list.  Aspirin use is not indicated based on review of current medical condition/s. Risk of harm outweighs potential benefits.  .    Patient Active Problem List   Diagnosis    Benign hypertrophy of prostate    Mixed hyperlipidemia    Essential hypertension, benign    Lung mass    Bronchial pneumonia    Chest tube in place    Weight loss    Empyema lung    Severe malnutrition     Advance Care Planning  ACP discussion was held with the patient during this visit. Patient has an advance directive in EMR which is still valid.     Review of Systems   Constitutional: Negative.    HENT: Negative.     Eyes: Negative.    Respiratory: Negative.     Cardiovascular: Negative.    Gastrointestinal: Negative.    Endocrine: Negative.    Genitourinary: Negative.    Musculoskeletal: Negative.    Skin: Negative.    Allergic/Immunologic: Negative.    Neurological: Negative.    Hematological: Negative.    Psychiatric/Behavioral: Negative.     All other systems reviewed and are negative.      Objective      Vitals:    10/04/23 1438   BP: 118/74   Pulse: 63   Resp: 14   Temp: 97.3 °F (36.3 °C)   TempSrc: Infrared   SpO2: 98%   Weight: 73.6 kg (162 lb 3.2 oz)   Height: 175.3 cm (69.02\")   PainSc: 0-No pain     BMI Readings from Last 1 Encounters:   10/04/23 23.94 kg/m²   BMI is within normal parameters. No follow-up required.    Does the patient have evidence " of cognitive impairment? No    Physical Exam  Vitals and nursing note reviewed.   Constitutional:       General: He is not in acute distress.     Appearance: Normal appearance. He is well-developed. He is not ill-appearing, toxic-appearing or diaphoretic.   HENT:      Head: Normocephalic and atraumatic.      Right Ear: External ear normal.      Left Ear: External ear normal.   Eyes:      Conjunctiva/sclera: Conjunctivae normal.      Pupils: Pupils are equal, round, and reactive to light.   Neck:      Thyroid: No thyromegaly.      Vascular: No carotid bruit.   Cardiovascular:      Rate and Rhythm: Normal rate and regular rhythm.      Pulses: Normal pulses.      Heart sounds: Normal heart sounds. No murmur heard.  Pulmonary:      Effort: Pulmonary effort is normal. No respiratory distress.      Breath sounds: Normal breath sounds.   Abdominal:      General: Bowel sounds are normal.      Palpations: Abdomen is soft. There is no mass.      Tenderness: There is no abdominal tenderness.   Musculoskeletal:         General: No swelling. Normal range of motion.      Cervical back: Normal range of motion and neck supple.   Lymphadenopathy:      Cervical: No cervical adenopathy.   Skin:     General: Skin is warm and dry.      Findings: No lesion or rash.   Neurological:      Mental Status: He is alert and oriented to person, place, and time.      Cranial Nerves: No cranial nerve deficit.      Sensory: No sensory deficit.      Motor: No weakness.      Coordination: Coordination normal.      Gait: Gait normal.      Deep Tendon Reflexes: Reflexes are normal and symmetric.   Psychiatric:         Mood and Affect: Mood normal.         Behavior: Behavior normal.         Thought Content: Thought content normal.         Judgment: Judgment normal.               HEALTH RISK ASSESSMENT    Smoking Status:  Social History     Tobacco Use   Smoking Status Never   Smokeless Tobacco Never     Alcohol Consumption:  Social History     Substance  and Sexual Activity   Alcohol Use No     Fall Risk Screen:    JANI Fall Risk Assessment was completed, and patient is at LOW risk for falls.Assessment completed on:10/4/2023    Depression Screening:      10/4/2023     2:48 PM   PHQ-2/PHQ-9 Depression Screening   Little Interest or Pleasure in Doing Things 0-->not at all   Feeling Down, Depressed or Hopeless 0-->not at all   Trouble Falling or Staying Asleep, or Sleeping Too Much 0-->not at all   Feeling Tired or Having Little Energy 0-->not at all   Poor Appetite or Overeating 0-->not at all   Feeling Bad about Yourself - or that You are a Failure or Have Let Yourself or Your Family Down 0-->not at all   Trouble Concentrating on Things, Such as Reading the Newspaper or Watching Television 0-->not at all   Moving or Speaking So Slowly that Other People Could Have Noticed? Or the Opposite - Being So Fidgety 0-->not at all   Thoughts that You Would be Better Off Dead or of Hurting Yourself in Some Way 0-->not at all   PHQ-9: Brief Depression Severity Measure Score 0   If You Checked Off Any Problems, How Difficult Have These Problems Made It For You to Do Your Work, Take Care of Things at Home, or Get Along with Other People? not difficult at all       Health Habits and Functional and Cognitive Screening:      10/4/2023     2:41 PM   Functional & Cognitive Status   Do you have difficulty preparing food and eating? No   Do you have difficulty bathing yourself, getting dressed or grooming yourself? No   Do you have difficulty using the toilet? No   Do you have difficulty moving around from place to place? No   Do you have trouble with steps or getting out of a bed or a chair? No   Current Diet Well Balanced Diet   Dental Exam Not up to date   Eye Exam Up to date   Exercise (times per week) 0 times per week   Current Exercises Include No Regular Exercise   Do you need help using the phone?  No   Are you deaf or do you have serious difficulty hearing?  No   Do you need  help to go to places out of walking distance? No   Do you need help shopping? No   Do you need help preparing meals?  No   Do you need help with housework?  No   Do you need help with laundry? No   Do you need help taking your medications? No   Do you need help managing money? No   Do you ever drive or ride in a car without wearing a seat belt? No   Have you felt unusual stress, anger or loneliness in the last month? No   Who do you live with? Spouse   If you need help, do you have trouble finding someone available to you? No   Have you been bothered in the last four weeks by sexual problems? No   Do you have difficulty concentrating, remembering or making decisions? No       Age-appropriate Screening Schedule:  Refer to the list below for future screening recommendations based on patient's age, sex and/or medical conditions. Orders for these recommended tests are listed in the plan section. The patient has been provided with a written plan.    Health Maintenance   Topic Date Due    URINE MICROALBUMIN  Never done    DIABETIC FOOT EXAM  Never done    HEMOGLOBIN A1C  09/13/2016    TDAP/TD VACCINES (2 - Td or Tdap) 10/01/2022    LIPID PANEL  11/01/2022    COVID-19 Vaccine (4 - 2023-24 season) 11/13/2023 (Originally 9/1/2023)    INFLUENZA VACCINE  03/31/2024 (Originally 8/1/2023)    DIABETIC EYE EXAM  12/29/2023    ANNUAL WELLNESS VISIT  10/04/2024    COLORECTAL CANCER SCREENING  08/31/2033    HEPATITIS C SCREENING  Completed    Pneumococcal Vaccine 65+  Completed    ZOSTER VACCINE  Completed              Assessment & Plan     CMS Preventative Services Quick Reference  Risk Factors Identified During Encounter  Fall Risk-High or Moderate: Discussed Fall Prevention in the home  The above risks/problems have been discussed with the patient.  Follow up actions/plans if indicated are seen below in the Assessment/Plan Section.  Pertinent information has been shared with the patient in the After Visit Summary.    Diagnoses and  all orders for this visit:    1. Encounter for subsequent annual wellness visit (AWV) in Medicare patient (Primary)    2. Routine general medical examination at a health care facility  -     Comprehensive Metabolic Panel; Future  -     Hemoglobin A1c; Future  -     Lipid Panel; Future  -     TSH; Future  -     CBC & Differential; Future    3. BPH with obstruction/lower urinary tract symptoms  -     PSA DIAGNOSTIC; Future        Follow Up:  No follow-ups on file.     An After Visit Summary and PPPS were given to the patient.    Patient has been erroneously marked as diabetic. Based on the available clinical information, he does not have diabetes and should therefore be excluded from diabetic health maintenance and quality measures for the remainder of the reporting period.

## 2023-10-05 LAB
ALBUMIN SERPL-MCNC: 4.9 G/DL (ref 3.5–5.2)
ALBUMIN/GLOB SERPL: 2 G/DL
ALP SERPL-CCNC: 85 U/L (ref 39–117)
ALT SERPL-CCNC: 15 U/L (ref 1–41)
AST SERPL-CCNC: 17 U/L (ref 1–40)
BASOPHILS # BLD AUTO: 0.02 10*3/MM3 (ref 0–0.2)
BASOPHILS NFR BLD AUTO: 0.4 % (ref 0–1.5)
BILIRUB SERPL-MCNC: 0.6 MG/DL (ref 0–1.2)
BUN SERPL-MCNC: 18 MG/DL (ref 8–23)
BUN/CREAT SERPL: 19.8 (ref 7–25)
CALCIUM SERPL-MCNC: 10.6 MG/DL (ref 8.6–10.5)
CHLORIDE SERPL-SCNC: 99 MMOL/L (ref 98–107)
CHOLEST SERPL-MCNC: 139 MG/DL (ref 0–200)
CO2 SERPL-SCNC: 28.9 MMOL/L (ref 22–29)
CREAT SERPL-MCNC: 0.91 MG/DL (ref 0.76–1.27)
EGFRCR SERPLBLD CKD-EPI 2021: 88.4 ML/MIN/1.73
EOSINOPHIL # BLD AUTO: 0.11 10*3/MM3 (ref 0–0.4)
EOSINOPHIL NFR BLD AUTO: 2.1 % (ref 0.3–6.2)
ERYTHROCYTE [DISTWIDTH] IN BLOOD BY AUTOMATED COUNT: 13.6 % (ref 12.3–15.4)
GLOBULIN SER CALC-MCNC: 2.4 GM/DL
GLUCOSE SERPL-MCNC: 97 MG/DL (ref 65–99)
HBA1C MFR BLD: 5.7 % (ref 4.8–5.6)
HCT VFR BLD AUTO: 39.8 % (ref 37.5–51)
HDLC SERPL-MCNC: 41 MG/DL (ref 40–60)
HGB BLD-MCNC: 13.2 G/DL (ref 13–17.7)
IMM GRANULOCYTES # BLD AUTO: 0.05 10*3/MM3 (ref 0–0.05)
IMM GRANULOCYTES NFR BLD AUTO: 1 % (ref 0–0.5)
LDLC SERPL CALC-MCNC: 72 MG/DL (ref 0–100)
LYMPHOCYTES # BLD AUTO: 1.87 10*3/MM3 (ref 0.7–3.1)
LYMPHOCYTES NFR BLD AUTO: 36.5 % (ref 19.6–45.3)
MCH RBC QN AUTO: 27.7 PG (ref 26.6–33)
MCHC RBC AUTO-ENTMCNC: 33.2 G/DL (ref 31.5–35.7)
MCV RBC AUTO: 83.4 FL (ref 79–97)
MONOCYTES # BLD AUTO: 0.58 10*3/MM3 (ref 0.1–0.9)
MONOCYTES NFR BLD AUTO: 11.3 % (ref 5–12)
NEUTROPHILS # BLD AUTO: 2.49 10*3/MM3 (ref 1.7–7)
NEUTROPHILS NFR BLD AUTO: 48.7 % (ref 42.7–76)
NRBC BLD AUTO-RTO: 0 /100 WBC (ref 0–0.2)
PLATELET # BLD AUTO: 248 10*3/MM3 (ref 140–450)
POTASSIUM SERPL-SCNC: 4 MMOL/L (ref 3.5–5.2)
PROT SERPL-MCNC: 7.3 G/DL (ref 6–8.5)
PSA SERPL-MCNC: 1.16 NG/ML (ref 0–4)
RBC # BLD AUTO: 4.77 10*6/MM3 (ref 4.14–5.8)
SODIUM SERPL-SCNC: 138 MMOL/L (ref 136–145)
TRIGL SERPL-MCNC: 151 MG/DL (ref 0–150)
TSH SERPL DL<=0.005 MIU/L-ACNC: 1.73 UIU/ML (ref 0.27–4.2)
VLDLC SERPL CALC-MCNC: 26 MG/DL (ref 5–40)
WBC # BLD AUTO: 5.12 10*3/MM3 (ref 3.4–10.8)

## 2024-01-12 ENCOUNTER — OFFICE VISIT (OUTPATIENT)
Dept: FAMILY MEDICINE CLINIC | Facility: CLINIC | Age: 74
End: 2024-01-12
Payer: MEDICARE

## 2024-01-12 VITALS
HEART RATE: 64 BPM | DIASTOLIC BLOOD PRESSURE: 84 MMHG | RESPIRATION RATE: 14 BRPM | HEIGHT: 69 IN | SYSTOLIC BLOOD PRESSURE: 126 MMHG | BODY MASS INDEX: 23.79 KG/M2 | OXYGEN SATURATION: 98 % | TEMPERATURE: 97.8 F | WEIGHT: 160.6 LBS

## 2024-01-12 DIAGNOSIS — E11.9 CONTROLLED TYPE 2 DIABETES MELLITUS WITHOUT COMPLICATION, WITHOUT LONG-TERM CURRENT USE OF INSULIN: Primary | ICD-10-CM

## 2024-01-12 DIAGNOSIS — G89.29 CHRONIC LEFT SHOULDER PAIN: ICD-10-CM

## 2024-01-12 DIAGNOSIS — Z01.818 PRE-OPERATIVE CLEARANCE: ICD-10-CM

## 2024-01-12 DIAGNOSIS — K21.9 GASTROESOPHAGEAL REFLUX DISEASE WITHOUT ESOPHAGITIS: ICD-10-CM

## 2024-01-12 DIAGNOSIS — M25.512 CHRONIC LEFT SHOULDER PAIN: ICD-10-CM

## 2024-01-12 PROBLEM — E11.65 TYPE 2 DIABETES MELLITUS WITH HYPERGLYCEMIA, WITHOUT LONG-TERM CURRENT USE OF INSULIN: Status: RESOLVED | Noted: 2024-01-12 | Resolved: 2024-01-12

## 2024-01-12 PROBLEM — E11.65 TYPE 2 DIABETES MELLITUS WITH HYPERGLYCEMIA, WITHOUT LONG-TERM CURRENT USE OF INSULIN: Status: ACTIVE | Noted: 2024-01-12

## 2024-01-12 LAB
EXPIRATION DATE: NORMAL
HBA1C MFR BLD: 5.2 % (ref 4.5–5.7)
Lab: NORMAL
POC CREATININE URINE: 4.4
POC MICROALBUMIN URINE: 80

## 2024-01-12 RX ORDER — MECOBALAMIN 5000 MCG
15 TABLET,DISINTEGRATING ORAL DAILY
Qty: 30 CAPSULE | Refills: 6 | Status: SHIPPED | OUTPATIENT
Start: 2024-01-12

## 2024-01-12 NOTE — PROGRESS NOTES
Subjective   Rhett Isaacs is a 73 y.o. male  Cataract (Cataract surgery by Dr. Quintana on Rt eye Jan 31, Lt eye Dec 14. Fax clearance to [phone]), GERD (RF lansoprazole 15mg-has been buying OTC), and Diabetes (F/U. A1C 5.2%)  Left Shoulder Pain x 3 months    Pre Op Clearance    History of Present Illness  Patient is a pleasant 73-year-old white male who comes in for GERD, left shoulder pain.  Patient's GERD is controlled with Prevacid 50 mg every day needs refill he has been using over-the-counter like prescription version    Patient also complains of type 2 diabetes patient comes in today for follow-up with watching his diet and exercise A1c is 5.2 microalbumin within normal limits      Patient also comes in for preop clearance for cataracts by Dr. Quintana    Patient complaining of left shoulder pain since October, states he has trouble lifting arm above head has stiffness and pain in left shoulder. denies any injury or trauma to left shoulder ,can sleep on left shoulder at night but has limited range of motion      The following portions of the patient's history were reviewed and updated as appropriate: allergies, current medications, past social history and problem list    Review of Systems   Constitutional:  Negative for appetite change, diaphoresis, fatigue and unexpected weight change.   Eyes:  Negative for visual disturbance.   Respiratory:  Negative for chest tightness and shortness of breath.    Cardiovascular:  Negative for chest pain, palpitations and leg swelling.   Gastrointestinal:  Negative for diarrhea, nausea and vomiting.   Endocrine: Negative for polydipsia, polyphagia and polyuria.   Musculoskeletal:  Positive for arthralgias.   Skin:  Negative for color change.   Neurological:  Negative for dizziness, syncope, weakness, light-headedness and numbness.       Objective     Vitals:    01/12/24 1425   BP: 126/84   Pulse: 64   Resp: 14   Temp: 97.8 °F (36.6 °C)   SpO2: 98%       Physical  Exam  Vitals and nursing note reviewed.   Constitutional:       General: He is not in acute distress.     Appearance: Normal appearance. He is well-developed. He is not ill-appearing, toxic-appearing or diaphoretic.   Neck:      Vascular: No carotid bruit or JVD.   Cardiovascular:      Rate and Rhythm: Normal rate and regular rhythm.      Pulses: Normal pulses.      Heart sounds: Normal heart sounds. No murmur heard.  Pulmonary:      Effort: Pulmonary effort is normal. No respiratory distress.      Breath sounds: Normal breath sounds.   Abdominal:      Palpations: Abdomen is soft.      Tenderness: There is no abdominal tenderness.   Musculoskeletal:      Left shoulder: Tenderness, bony tenderness and crepitus present. Decreased range of motion.   Skin:     General: Skin is warm and dry.   Neurological:      Mental Status: He is alert.         Assessment & Plan     Diagnoses and all orders for this visit:    1. Controlled type 2 diabetes mellitus without complication, without long-term current use of insulin (Primary)    2. Gastroesophageal reflux disease without esophagitis    3. Pre-operative clearance    4. Chronic left shoulder pain  -     Ambulatory Referral to Physical Therapy Evaluate and treat    Other orders  -     lansoprazole (Prevacid) 15 MG capsule; Take 1 capsule by mouth Daily.  Dispense: 30 capsule; Refill: 6    #1 cleared for surgery without limitations    #2 refer to physical therapy for evaluation of shoulder    3.  Follow-up in 3 months to recheck blood sugar    I spent 15 minutes in patient care: Reviewing records prior to the visit, examining the patient, entering orders and documentation    Part of this note may be an electronic transcription/translation of spoken language to printed text using the Dragon Dictation System.

## 2024-04-12 ENCOUNTER — OFFICE VISIT (OUTPATIENT)
Dept: FAMILY MEDICINE CLINIC | Facility: CLINIC | Age: 74
End: 2024-04-12
Payer: MEDICARE

## 2024-04-12 VITALS
RESPIRATION RATE: 16 BRPM | SYSTOLIC BLOOD PRESSURE: 144 MMHG | TEMPERATURE: 98 F | HEART RATE: 75 BPM | WEIGHT: 160.2 LBS | DIASTOLIC BLOOD PRESSURE: 84 MMHG | BODY MASS INDEX: 23.73 KG/M2 | HEIGHT: 69 IN | OXYGEN SATURATION: 98 %

## 2024-04-12 DIAGNOSIS — I10 ESSENTIAL HYPERTENSION, BENIGN: Primary | ICD-10-CM

## 2024-04-12 DIAGNOSIS — G89.29 CHRONIC LEFT SHOULDER PAIN: ICD-10-CM

## 2024-04-12 DIAGNOSIS — R63.4 WEIGHT LOSS: ICD-10-CM

## 2024-04-12 DIAGNOSIS — M25.512 CHRONIC LEFT SHOULDER PAIN: ICD-10-CM

## 2024-04-12 PROCEDURE — 99214 OFFICE O/P EST MOD 30 MIN: CPT | Performed by: FAMILY MEDICINE

## 2024-04-12 PROCEDURE — 1160F RVW MEDS BY RX/DR IN RCRD: CPT | Performed by: FAMILY MEDICINE

## 2024-04-12 PROCEDURE — 3044F HG A1C LEVEL LT 7.0%: CPT | Performed by: FAMILY MEDICINE

## 2024-04-12 PROCEDURE — 1159F MED LIST DOCD IN RCRD: CPT | Performed by: FAMILY MEDICINE

## 2024-04-12 PROCEDURE — 3077F SYST BP >= 140 MM HG: CPT | Performed by: FAMILY MEDICINE

## 2024-04-12 PROCEDURE — 3079F DIAST BP 80-89 MM HG: CPT | Performed by: FAMILY MEDICINE

## 2024-04-13 NOTE — PROGRESS NOTES
Subjective   Rhett Isaacs is a 74 y.o. male      Chief Complaint  Hypertension  Review medications       HPI  Mr. Rhett Isaacs is a 74-year-old male who presents with a chief complaint of hypertension. The patient has longstanding hypertension and is due for a refill on his Lotensin. He is also on hydrochlorothiazide 25 mg and Toprol- mg daily. His blood pressure today is 144/84 mmHg.    He was diagnosed with adhesive capsulitis in his left arm by Jose in 10/2023. This discomfort persisted for approximately 2 to 3 months without any precipitating injury. He reports difficulty in raising his arm and was informed that it could be a rotator cuff issue. He was referred to physical therapy at [facility], where he now manages to place his hand behind his head. However, he has been experiencing pain in his right shoulder and bilateral wrists, which he attributes to chopping ice with a scraper several years ago. He has not sought orthopedic consultation for his shoulder. Despite his physical therapist's assessment that the issue was not rotator cuff-related, he continues to perform shoulder exercises. He has been self-medicating with Advil, 600 mg, and Tylenol, but reports minimal relief.    He also reports knee pain and a sensation of tightness. Despite being able to fully bend his knee, he occasionally experiences a sensation of tightness. He denies any history of arthritis but suspects that something is impacting his joints. He has been taking Omega 3 XL, Osteo Bi-Flex, and [place] Omega-3. He is also taking vitamin D3 supplements.    Supplemental Information  He was in the hospital for 5 to 6 days due to loss of appetite and lost 40 pounds. He was around 180 pounds but has gained a little bit back. He had a mass in his lung when this all first started. He went to the walk-in clinic. They took an x-ray and found the mass in there. He got lung cancer. He has never smoked. After going through a bronchoscopy, a  needle biopsy was done. He was told he does not have lung cancer, but he had strep 3 in his lungs. He was in the hospital for 5 to 6 days. They put a chest tube in there. It was draining on it. Dr. Arriaga came in and pulled the tube out. He felt good. He had a number of CT scans. He was in the infusion lab for 2 months, 7 days a week on ceftriaxone for 2 months. After he got out of that, he was put on penicillin for 2 months. He was supposed to take it for 2 months, but he got C. difficile. He was just on straight penicillin because the amoxicillin is such a broad-spectrum that it always gives him diarrhea. He was on Silverio probiotic. He had cataract surgery in both eyes. His vision is fuzzy. He needs to see Dr. Rashaun Quintana. He was told he has mild macular degeneration.      The following portions of the patient's history were reviewed and updated as appropriate: allergies, current medications, past social history and problem list.    Review of Systems   Constitutional:  Negative for fatigue and unexpected weight change.   Respiratory:  Negative for cough, chest tightness and shortness of breath.    Cardiovascular:  Negative for chest pain, palpitations and leg swelling.   Gastrointestinal:  Negative for nausea.   Musculoskeletal:  Positive for arthralgias.   Skin:  Negative for color change and rash.   Neurological:  Negative for dizziness, syncope, weakness and headaches.         Objective         Vitals:    04/12/24 1416   BP: 144/84   Pulse: 75   Resp: 16   Temp: 98 °F (36.7 °C)   SpO2: 98%         Physical Exam  Vitals and nursing note reviewed.   Constitutional:       General: He is not in acute distress.     Appearance: He is well-developed. He is not diaphoretic.   HENT:      Head: Normocephalic.   Neck:      Vascular: No JVD.   Cardiovascular:      Rate and Rhythm: Normal rate and regular rhythm.      Heart sounds: Normal heart sounds. No murmur heard.     Comments: No edema  Pulmonary:      Effort:  Pulmonary effort is normal. No respiratory distress.      Breath sounds: Normal breath sounds.   Chest:      Chest wall: No tenderness.   Abdominal:      General: There is no distension.      Palpations: Abdomen is soft.      Tenderness: There is no abdominal tenderness.   Skin:     General: Skin is warm and dry.      Capillary Refill: Capillary refill takes less than 2 seconds.   Neurological:      Mental Status: He is alert and oriented to person, place, and time.              No results were obtained or interpreted today.      Assessment & Plan     Problems Addressed this Visit          Cardiac and Vasculature    Essential hypertension, benign - Primary       Endocrine and Metabolic    Weight loss     Other Visit Diagnoses       Chronic left shoulder pain              Diagnoses         Codes Comments    Essential hypertension, benign    -  Primary ICD-10-CM: I10  ICD-9-CM: 401.1     Weight loss     ICD-10-CM: R63.4  ICD-9-CM: 783.21     Chronic left shoulder pain     ICD-10-CM: M25.512, G89.29  ICD-9-CM: 719.41, 338.29                Plan    Continue current medications without changes.    Continue full activities without restriction.    Encourage appropriate nutrition.       Transcribed from ambient dictation for R Jose Delvalle MD by Eugene Lucas  04/12/24   21:27 EDT    Patient or patient representative verbalized consent to the visit recording.  I have personally performed the services described in this document as transcribed by the above individual, and it is both accurate and complete.   Answers submitted by the patient for this visit:  Primary Reason for Visit (Submitted on 4/11/2024)  What is the primary reason for your visit?: Other  Other (Submitted on 4/11/2024)  Please describe your symptoms.: Frozen shoulder, sore wrists, & knnees  Have you had these symptoms before?: No  How long have you been having these symptoms?: Greater than 2 weeks  Please list any medications you are currently  taking for this condition.: Tylenol & Advil  Please describe any probable cause for these symptoms. : Old age

## 2024-05-07 RX ORDER — BENAZEPRIL HYDROCHLORIDE 10 MG/1
10 TABLET ORAL DAILY
Qty: 90 TABLET | Refills: 3 | Status: SHIPPED | OUTPATIENT
Start: 2024-05-07

## 2024-07-02 ENCOUNTER — TELEPHONE (OUTPATIENT)
Dept: FAMILY MEDICINE CLINIC | Facility: CLINIC | Age: 74
End: 2024-07-02

## 2024-07-02 NOTE — TELEPHONE ENCOUNTER
Jimenez Arguellorosibel, patient's spouse, and Mabel Morgan, [id number]  URL: [email or web address]  [facility]. Bindu Alba, PT, DPT

## 2024-07-02 NOTE — TELEPHONE ENCOUNTER
Caller: Jennifer Isaacs    Relationship: Emergency Contact    Best call back number:[phone]     What form or medical record are you requesting: SIGNED FORMS FOR HANDICAPPED PLACARD    Who is requesting this form or medical record from you: THE PATIENT'S WIFE    How would you like to receive the form or medical records (pick-up, mail, fax): MAIL TO HOME ADDRESS    Timeframe paperwork needed: AS SOON AS POSSIBLE

## 2024-07-29 RX ORDER — MECOBALAMIN 5000 MCG
15 TABLET,DISINTEGRATING ORAL DAILY
Qty: 90 CAPSULE | Refills: 3 | Status: SHIPPED | OUTPATIENT
Start: 2024-07-29

## 2024-08-20 NOTE — TELEPHONE ENCOUNTER
August 20, 2024      [facility]  [facility] [facility] [address]  Phone: [phone]  Fax: [phone]       Patient: Cely Alexander   YOB: 1965  Date of Visit: 08/20/2024    To Whom It May Concern:    Vini Alexander  was at [facility] on 08/20/2024. The patient may return to work/school on 08/28/2024 with no restrictions. If you have any questions or concerns, or if I can be of further assistance, please do not hesitate to contact me.    Sincerely,    Angelica Hogan NP      Quinapril 40mg is on backorder.    Please send a different medication in. Thanks!    [facility]   (4) no impairment

## 2024-09-01 DIAGNOSIS — N40.1 BENIGN NON-NODULAR PROSTATIC HYPERPLASIA WITH LOWER URINARY TRACT SYMPTOMS: ICD-10-CM

## 2024-09-01 DIAGNOSIS — I10 ESSENTIAL HYPERTENSION: ICD-10-CM

## 2024-09-01 DIAGNOSIS — E78.5 HYPERLIPIDEMIA, UNSPECIFIED HYPERLIPIDEMIA TYPE: ICD-10-CM

## 2024-09-03 RX ORDER — SIMVASTATIN 40 MG
40 TABLET ORAL
Qty: 90 TABLET | Refills: 3 | Status: SHIPPED | OUTPATIENT
Start: 2024-09-03

## 2024-09-03 RX ORDER — METOPROLOL SUCCINATE 100 MG/1
TABLET, EXTENDED RELEASE ORAL
Qty: 90 TABLET | Refills: 3 | Status: SHIPPED | OUTPATIENT
Start: 2024-09-03

## 2024-09-03 RX ORDER — HYDROCHLOROTHIAZIDE 25 MG/1
TABLET ORAL
Qty: 90 TABLET | Refills: 3 | Status: SHIPPED | OUTPATIENT
Start: 2024-09-03

## 2024-09-03 RX ORDER — FINASTERIDE 5 MG/1
TABLET, FILM COATED ORAL
Qty: 90 TABLET | Refills: 3 | Status: SHIPPED | OUTPATIENT
Start: 2024-09-03

## 2024-11-12 ENCOUNTER — OFFICE VISIT (OUTPATIENT)
Dept: FAMILY MEDICINE CLINIC | Facility: CLINIC | Age: 74
End: 2024-11-12
Payer: MEDICARE

## 2024-11-12 ENCOUNTER — LAB (OUTPATIENT)
Dept: FAMILY MEDICINE CLINIC | Facility: CLINIC | Age: 74
End: 2024-11-12
Payer: MEDICARE

## 2024-11-12 VITALS
HEART RATE: 55 BPM | RESPIRATION RATE: 14 BRPM | OXYGEN SATURATION: 98 % | TEMPERATURE: 97.6 F | DIASTOLIC BLOOD PRESSURE: 82 MMHG | WEIGHT: 159.2 LBS | SYSTOLIC BLOOD PRESSURE: 126 MMHG | HEIGHT: 69 IN | BODY MASS INDEX: 23.58 KG/M2

## 2024-11-12 DIAGNOSIS — N40.1 BPH WITH OBSTRUCTION/LOWER URINARY TRACT SYMPTOMS: ICD-10-CM

## 2024-11-12 DIAGNOSIS — N13.8 BPH WITH OBSTRUCTION/LOWER URINARY TRACT SYMPTOMS: ICD-10-CM

## 2024-11-12 DIAGNOSIS — Z00.00 ROUTINE GENERAL MEDICAL EXAMINATION AT A HEALTH CARE FACILITY: ICD-10-CM

## 2024-11-12 DIAGNOSIS — Z00.00 ENCOUNTER FOR SUBSEQUENT ANNUAL WELLNESS VISIT (AWV) IN MEDICARE PATIENT: Primary | ICD-10-CM

## 2024-11-12 LAB
ALBUMIN SERPL-MCNC: 4.8 G/DL (ref 3.5–5.2)
ALBUMIN/GLOB SERPL: 1.6 G/DL
ALP SERPL-CCNC: 118 U/L (ref 39–117)
ALT SERPL W P-5'-P-CCNC: 15 U/L (ref 1–41)
ANION GAP SERPL CALCULATED.3IONS-SCNC: 10 MMOL/L (ref 5–15)
AST SERPL-CCNC: 17 U/L (ref 1–40)
BILIRUB SERPL-MCNC: 0.4 MG/DL (ref 0–1.2)
BUN SERPL-MCNC: 20 MG/DL (ref 8–23)
BUN/CREAT SERPL: 20.6 (ref 7–25)
CALCIUM SPEC-SCNC: 10.4 MG/DL (ref 8.6–10.5)
CHLORIDE SERPL-SCNC: 98 MMOL/L (ref 98–107)
CHOLEST SERPL-MCNC: 128 MG/DL (ref 0–200)
CO2 SERPL-SCNC: 29 MMOL/L (ref 22–29)
CREAT SERPL-MCNC: 0.97 MG/DL (ref 0.76–1.27)
DEPRECATED RDW RBC AUTO: 42.7 FL (ref 37–54)
EGFRCR SERPLBLD CKD-EPI 2021: 81.4 ML/MIN/1.73
ERYTHROCYTE [DISTWIDTH] IN BLOOD BY AUTOMATED COUNT: 14.3 % (ref 12.3–15.4)
GLOBULIN UR ELPH-MCNC: 3 GM/DL
GLUCOSE SERPL-MCNC: 93 MG/DL (ref 65–99)
HCT VFR BLD AUTO: 42.8 % (ref 37.5–51)
HDLC SERPL-MCNC: 37 MG/DL (ref 40–60)
HGB BLD-MCNC: 14 G/DL (ref 13–17.7)
LDLC SERPL CALC-MCNC: 71 MG/DL (ref 0–100)
LDLC/HDLC SERPL: 1.89 {RATIO}
MCH RBC QN AUTO: 27 PG (ref 26.6–33)
MCHC RBC AUTO-ENTMCNC: 32.7 G/DL (ref 31.5–35.7)
MCV RBC AUTO: 82.6 FL (ref 79–97)
PLATELET # BLD AUTO: 241 10*3/MM3 (ref 140–450)
PMV BLD AUTO: 11.2 FL (ref 6–12)
POTASSIUM SERPL-SCNC: 4.2 MMOL/L (ref 3.5–5.2)
PROT SERPL-MCNC: 7.8 G/DL (ref 6–8.5)
PSA SERPL-MCNC: 1.09 NG/ML (ref 0–4)
RBC # BLD AUTO: 5.18 10*6/MM3 (ref 4.14–5.8)
SODIUM SERPL-SCNC: 137 MMOL/L (ref 136–145)
TRIGL SERPL-MCNC: 106 MG/DL (ref 0–150)
TSH SERPL DL<=0.05 MIU/L-ACNC: 1.79 UIU/ML (ref 0.27–4.2)
VLDLC SERPL-MCNC: 20 MG/DL (ref 5–40)
WBC NRBC COR # BLD AUTO: 5.23 10*3/MM3 (ref 3.4–10.8)

## 2024-11-12 PROCEDURE — 1126F AMNT PAIN NOTED NONE PRSNT: CPT | Performed by: PHYSICIAN ASSISTANT

## 2024-11-12 PROCEDURE — 36415 COLL VENOUS BLD VENIPUNCTURE: CPT | Performed by: FAMILY MEDICINE

## 2024-11-12 PROCEDURE — G0439 PPPS, SUBSEQ VISIT: HCPCS | Performed by: PHYSICIAN ASSISTANT

## 2024-11-12 PROCEDURE — 96160 PT-FOCUSED HLTH RISK ASSMT: CPT | Performed by: PHYSICIAN ASSISTANT

## 2024-11-12 PROCEDURE — 3074F SYST BP LT 130 MM HG: CPT | Performed by: PHYSICIAN ASSISTANT

## 2024-11-12 PROCEDURE — 1170F FXNL STATUS ASSESSED: CPT | Performed by: PHYSICIAN ASSISTANT

## 2024-11-12 PROCEDURE — 3044F HG A1C LEVEL LT 7.0%: CPT | Performed by: PHYSICIAN ASSISTANT

## 2024-11-12 PROCEDURE — 84443 ASSAY THYROID STIM HORMONE: CPT | Performed by: PHYSICIAN ASSISTANT

## 2024-11-12 PROCEDURE — 85027 COMPLETE CBC AUTOMATED: CPT | Performed by: PHYSICIAN ASSISTANT

## 2024-11-12 PROCEDURE — 84153 ASSAY OF PSA TOTAL: CPT | Performed by: PHYSICIAN ASSISTANT

## 2024-11-12 PROCEDURE — 99397 PER PM REEVAL EST PAT 65+ YR: CPT | Performed by: PHYSICIAN ASSISTANT

## 2024-11-12 PROCEDURE — 80061 LIPID PANEL: CPT | Performed by: PHYSICIAN ASSISTANT

## 2024-11-12 PROCEDURE — 3079F DIAST BP 80-89 MM HG: CPT | Performed by: PHYSICIAN ASSISTANT

## 2024-11-12 PROCEDURE — 80053 COMPREHEN METABOLIC PANEL: CPT | Performed by: PHYSICIAN ASSISTANT

## 2024-11-12 RX ORDER — CHOLECALCIFEROL (VITAMIN D3) 50 MCG
1 TABLET ORAL DAILY
COMMUNITY

## 2024-11-12 RX ORDER — VIT C/B6/B5/MAGNESIUM/HERB 173 50-5-6-5MG
1 CAPSULE ORAL DAILY
COMMUNITY

## 2024-11-12 NOTE — PROGRESS NOTES
The ABCs of the Annual Wellness Visit  Subsequent Medicare Wellness Visit    Chief Complaint   Patient presents with    Medicare Wellness-subsequent     Fasting for labs. UTD on colonoscopy.      Subjective   History of Present Illness:  Rhett Isaacs is a 74 y.o. male who presents for a Subsequent Medicare Wellness Visit.  History of Present Illness      The following portions of the patient's history were reviewed and   updated as appropriate: allergies, current medications, past family history, past medical history, past social history, past surgical history, and problem list.    Compared to one year ago, the patient feels his physical   health is better.    Compared to one year ago, the patient feels his mental   health is better.    Recent Hospitalizations:  He was not admitted to the hospital during the last year.       Current Medical Providers:  Patient Care Team:  Elieser Delvalle MD as PCP - General (Family Medicine)  Salvatore Torres MD as Consulting Physician (Gastroenterology)  Jeremias Hughes MD as Consulting Physician (Pulmonary Disease)    Outpatient Medications Prior to Visit   Medication Sig Dispense Refill    benazepril (LOTENSIN) 10 MG tablet TAKE 1 TABLET BY MOUTH EVERY DAY 90 tablet 3    Cholecalciferol (Vitamin D3) 50 MCG (2000 UT) tablet Take 1 tablet by mouth Daily.      finasteride (PROSCAR) 5 MG tablet TAKE 1 TABLET BY MOUTH EVERY DAY 90 tablet 3    hydroCHLOROthiazide 25 MG tablet TAKE 1 TABLET BY MOUTH EVERY DAY 90 tablet 3    lansoprazole (PREVACID) 15 MG capsule TAKE 1 CAPSULE BY MOUTH EVERY DAY 90 capsule 3    Magnesium 250 MG capsule Take 1 capsule by mouth Daily.      metoprolol succinate XL (TOPROL-XL) 100 MG 24 hr tablet TAKE 1 TABLET BY MOUTH EVERY DAY 90 tablet 3    Multiple Vitamins-Minerals (PRESERVISION AREDS 2 PO) Take  by mouth Daily.      simvastatin (ZOCOR) 40 MG tablet TAKE 1 TABLET BY MOUTH EVERY DAY AT NIGHT 90 tablet 3    Turmeric 500 MG  "capsule Take 1 capsule by mouth Daily.       No facility-administered medications prior to visit.       No opioid medication identified on active medication list. I have reviewed chart for other potential  high risk medication/s and harmful drug interactions in the elderly.        Aspirin is not on active medication list.  Aspirin use is not indicated based on review of current medical condition/s. Risk of harm outweighs potential benefits.  .    Patient Active Problem List   Diagnosis    Benign hypertrophy of prostate    Mixed hyperlipidemia    Essential hypertension, benign    Lung mass    Bronchial pneumonia    Chest tube in place    Weight loss    Empyema lung    Severe malnutrition     Advance Care Planning  ACP discussion was held with the patient during this visit. Patient has an advance directive in EMR which is still valid.     Review of Systems      Objective      Vitals:    24 0944   BP: 126/82   Pulse: 55   Resp: 14   Temp: 97.6 °F (36.4 °C)   TempSrc: Infrared   SpO2: 98%   Weight: 72.2 kg (159 lb 3.2 oz)   Height: 175.3 cm (69.02\")   PainSc: 0-No pain     BMI Readings from Last 1 Encounters:   24 23.50 kg/m²   BMI is within normal parameters. No follow-up required.    Does the patient have evidence of cognitive impairment? {Yes/No:31956}    Physical Exam  Physical Exam         Results             HEALTH RISK ASSESSMENT    Smoking Status:  Social History     Tobacco Use   Smoking Status Never   Smokeless Tobacco Never     Alcohol Consumption:  Social History     Substance and Sexual Activity   Alcohol Use No     Fall Risk Screen:    STEADI Fall Risk Assessment was completed, and patient is at LOW risk for falls.Assessment completed on:2024    Depression Screenin/12/2024     2:19 PM   PHQ-2/PHQ-9 Depression Screening   Retired Little Interest or Pleasure in Doing Things 0-->not at all   Retired Feeling Down, Depressed or Hopeless 0-->not at all   Retired PHQ-9: Brief Depression " Severity Measure Score 0       Health Habits and Functional and Cognitive Screenin/12/2024     9:44 AM   Functional & Cognitive Status   Do you have difficulty preparing food and eating? No   Do you have difficulty bathing yourself, getting dressed or grooming yourself? No   Do you have difficulty using the toilet? No   Do you have difficulty moving around from place to place? No   Do you have trouble with steps or getting out of a bed or a chair? No   Current Diet Well Balanced Diet   Dental Exam Up to date   Eye Exam Up to date   Exercise (times per week) 1 times per week   Current Exercises Include Yard Work   Do you need help using the phone?  No   Are you deaf or do you have serious difficulty hearing?  No   Do you need help to go to places out of walking distance? No   Do you need help shopping? No   Do you need help preparing meals?  No   Do you need help with housework?  No   Do you need help with laundry? No   Do you need help taking your medications? No   Do you need help managing money? No   Do you ever drive or ride in a car without wearing a seat belt? No   Have you felt unusual stress, anger or loneliness in the last month? No   Who do you live with? Spouse   If you need help, do you have trouble finding someone available to you? No   Have you been bothered in the last four weeks by sexual problems? No   Do you have difficulty concentrating, remembering or making decisions? No       Age-appropriate Screening Schedule:  Refer to the list below for future screening recommendations based on patient's age, sex and/or medical conditions. Orders for these recommended tests are listed in the plan section. The patient has been provided with a written plan.    Health Maintenance   Topic Date Due    DIABETIC FOOT EXAM  Never done    TDAP/TD VACCINES (2 - Td or Tdap) 10/01/2022    HEMOGLOBIN A1C  2024    COVID-19 Vaccine ( season) 2024    ANNUAL WELLNESS VISIT  10/04/2024    LIPID  PANEL  10/04/2024    DIABETIC EYE EXAM  01/04/2025    INFLUENZA VACCINE  03/31/2025 (Originally 8/1/2024)    RSV Vaccine - Adults (1 - 1-dose 75+ series) 04/12/2025 (Originally 4/2/2024)    COLORECTAL CANCER SCREENING  08/31/2033    HEPATITIS C SCREENING  Completed    Pneumococcal Vaccine 65+  Completed    ZOSTER VACCINE  Completed    URINE MICROALBUMIN  Discontinued              Assessment & Plan     CMS Preventative Services Quick Reference  Risk Factors Identified During Encounter  {Medicare Wellness Risk Factors:76265}  The above risks/problems have been discussed with the patient.  Follow up actions/plans if indicated are seen below in the Assessment/Plan Section.  Pertinent information has been shared with the patient in the After Visit Summary.    Diagnoses and all orders for this visit:    1. Encounter for subsequent annual wellness visit (AWV) in Medicare patient [Z00.00 (ICD-10-CM)] (Primary)    2. Routine general medical examination at a health care facility  -     Comprehensive Metabolic Panel; Future  -     Lipid Panel; Future  -     TSH; Future  -     CBC (No Diff); Future    3. BPH with obstruction/lower urinary tract symptoms  -     PSA DIAGNOSTIC; Future      Assessment & Plan      Follow Up:  No follow-ups on file.     An After Visit Summary and PPPS were given to the patient.           Patient or patient representative verbalized consent for the use of Ambient Listening during the visit with  WINSTON Flores for chart documentation. 11/12/2024  09:56 EST    Preventive medicine discussed, diet, exercise, healthy living discussed at length.  Discussed nutrition, physical activity, healthy weight, injury prevention, misuse of tobacco, alcohol and drugs, dental health, mental health, immunizations, screening    Part of this note may be an electronic transcription/translation of spoken language to printed text using the Dragon Dictation System.     Patient has been erroneously marked as  diabetic. Based on the available clinical information, he does not have diabetes and should therefore be excluded from diabetic health maintenance and quality measures for the remainder of the reporting period.

## 2024-11-12 NOTE — PROGRESS NOTES
The ABCs of the Annual Wellness Visit  Subsequent Medicare Wellness Visit    Chief Complaint   Patient presents with    Medicare Wellness-subsequent     Fasting for labs. UTD on colonoscopy.      Subjective   History of Present Illness:  Rhett Isaacs is a 74 y.o. male who presents for a Subsequent Medicare Wellness Visit.  History of Present Illness  Patient is a 74-year-old white male comes in for subsequent wellness examination along with preventive medical examination denies any problems or complaints    The following portions of the patient's history were reviewed and   updated as appropriate: allergies, current medications, past family history, past medical history, past social history, past surgical history, and problem list.    Compared to one year ago, the patient feels his physical   health is better.    Compared to one year ago, the patient feels his mental   health is better.    Recent Hospitalizations:  He was not admitted to the hospital during the last year.       Current Medical Providers:  Patient Care Team:  Elieser Delvalle MD as PCP - General (Family Medicine)  Salvatore Torres MD as Consulting Physician (Gastroenterology)  Jeremias Hughes MD as Consulting Physician (Pulmonary Disease)    Outpatient Medications Prior to Visit   Medication Sig Dispense Refill    benazepril (LOTENSIN) 10 MG tablet TAKE 1 TABLET BY MOUTH EVERY DAY 90 tablet 3    Cholecalciferol (Vitamin D3) 50 MCG (2000 UT) tablet Take 1 tablet by mouth Daily.      finasteride (PROSCAR) 5 MG tablet TAKE 1 TABLET BY MOUTH EVERY DAY 90 tablet 3    hydroCHLOROthiazide 25 MG tablet TAKE 1 TABLET BY MOUTH EVERY DAY 90 tablet 3    lansoprazole (PREVACID) 15 MG capsule TAKE 1 CAPSULE BY MOUTH EVERY DAY 90 capsule 3    Magnesium 250 MG capsule Take 1 capsule by mouth Daily.      metoprolol succinate XL (TOPROL-XL) 100 MG 24 hr tablet TAKE 1 TABLET BY MOUTH EVERY DAY 90 tablet 3    Multiple Vitamins-Minerals  "(PRESERVISION AREDS 2 PO) Take  by mouth Daily.      simvastatin (ZOCOR) 40 MG tablet TAKE 1 TABLET BY MOUTH EVERY DAY AT NIGHT 90 tablet 3    Turmeric 500 MG capsule Take 1 capsule by mouth Daily.       No facility-administered medications prior to visit.       No opioid medication identified on active medication list. I have reviewed chart for other potential  high risk medication/s and harmful drug interactions in the elderly.        Aspirin is not on active medication list.  Aspirin use is not indicated based on review of current medical condition/s. Risk of harm outweighs potential benefits.  .    Patient Active Problem List   Diagnosis    Benign hypertrophy of prostate    Mixed hyperlipidemia    Essential hypertension, benign    Lung mass    Bronchial pneumonia    Chest tube in place    Weight loss    Empyema lung    Severe malnutrition     Advance Care Planning  ACP discussion was held with the patient during this visit. Patient has an advance directive in EMR which is still valid.     Review of Systems   Constitutional: Negative.    HENT: Negative.     Eyes: Negative.    Respiratory: Negative.     Cardiovascular: Negative.    Gastrointestinal: Negative.    Endocrine: Negative.    Genitourinary: Negative.    Musculoskeletal: Negative.    Skin: Negative.    Allergic/Immunologic: Negative.    Neurological: Negative.    Hematological: Negative.    Psychiatric/Behavioral: Negative.     All other systems reviewed and are negative.        Objective      Vitals:    11/12/24 0944   BP: 126/82   Pulse: 55   Resp: 14   Temp: 97.6 °F (36.4 °C)   TempSrc: Infrared   SpO2: 98%   Weight: 72.2 kg (159 lb 3.2 oz)   Height: 175.3 cm (69.02\")   PainSc: 0-No pain     BMI Readings from Last 1 Encounters:   11/12/24 23.50 kg/m²   BMI is within normal parameters. No follow-up required.    Does the patient have evidence of cognitive impairment? No    Physical Exam  Vitals and nursing note reviewed.   Constitutional:       General: " He is not in acute distress.     Appearance: Normal appearance. He is well-developed. He is not ill-appearing, toxic-appearing or diaphoretic.   HENT:      Head: Normocephalic and atraumatic.      Right Ear: External ear normal.      Left Ear: External ear normal.   Eyes:      Conjunctiva/sclera: Conjunctivae normal.      Pupils: Pupils are equal, round, and reactive to light.   Neck:      Thyroid: No thyromegaly.      Vascular: No carotid bruit.   Cardiovascular:      Rate and Rhythm: Normal rate and regular rhythm.      Pulses: Normal pulses.      Heart sounds: Normal heart sounds. No murmur heard.  Pulmonary:      Effort: Pulmonary effort is normal. No respiratory distress.      Breath sounds: Normal breath sounds.   Abdominal:      General: Bowel sounds are normal.      Palpations: Abdomen is soft. There is no mass.      Tenderness: There is no abdominal tenderness.   Musculoskeletal:         General: No swelling. Normal range of motion.      Cervical back: Normal range of motion and neck supple.   Lymphadenopathy:      Cervical: No cervical adenopathy.   Skin:     General: Skin is warm and dry.      Findings: No lesion or rash.   Neurological:      Mental Status: He is alert and oriented to person, place, and time.      Cranial Nerves: No cranial nerve deficit.      Sensory: No sensory deficit.      Motor: No weakness.      Coordination: Coordination normal.      Gait: Gait normal.      Deep Tendon Reflexes: Reflexes are normal and symmetric.   Psychiatric:         Mood and Affect: Mood normal.         Behavior: Behavior normal.         Thought Content: Thought content normal.         Judgment: Judgment normal.       Physical Exam         Results             HEALTH RISK ASSESSMENT    Smoking Status:  Social History     Tobacco Use   Smoking Status Never   Smokeless Tobacco Never     Alcohol Consumption:  Social History     Substance and Sexual Activity   Alcohol Use No     Fall Risk Screen:    STEADI Fall Risk  Assessment was completed, and patient is at LOW risk for falls.Assessment completed on:2024    Depression Screenin/12/2024     2:19 PM   PHQ-2/PHQ-9 Depression Screening   Retired Little Interest or Pleasure in Doing Things 0-->not at all   Retired Feeling Down, Depressed or Hopeless 0-->not at all   Retired PHQ-9: Brief Depression Severity Measure Score 0       Health Habits and Functional and Cognitive Screenin/12/2024     9:44 AM   Functional & Cognitive Status   Do you have difficulty preparing food and eating? No   Do you have difficulty bathing yourself, getting dressed or grooming yourself? No   Do you have difficulty using the toilet? No   Do you have difficulty moving around from place to place? No   Do you have trouble with steps or getting out of a bed or a chair? No   Current Diet Well Balanced Diet   Dental Exam Up to date   Eye Exam Up to date   Exercise (times per week) 1 times per week   Current Exercises Include Yard Work   Do you need help using the phone?  No   Are you deaf or do you have serious difficulty hearing?  No   Do you need help to go to places out of walking distance? No   Do you need help shopping? No   Do you need help preparing meals?  No   Do you need help with housework?  No   Do you need help with laundry? No   Do you need help taking your medications? No   Do you need help managing money? No   Do you ever drive or ride in a car without wearing a seat belt? No   Have you felt unusual stress, anger or loneliness in the last month? No   Who do you live with? Spouse   If you need help, do you have trouble finding someone available to you? No   Have you been bothered in the last four weeks by sexual problems? No   Do you have difficulty concentrating, remembering or making decisions? No       Age-appropriate Screening Schedule:  Refer to the list below for future screening recommendations based on patient's age, sex and/or medical conditions. Orders for these  recommended tests are listed in the plan section. The patient has been provided with a written plan.    Health Maintenance   Topic Date Due    TDAP/TD VACCINES (2 - Td or Tdap) 10/01/2022    COVID-19 Vaccine (4 - 2024-25 season) 09/01/2024    ANNUAL WELLNESS VISIT  10/04/2024    LIPID PANEL  10/04/2024    INFLUENZA VACCINE  03/31/2025 (Originally 8/1/2024)    RSV Vaccine - Adults (1 - 1-dose 75+ series) 04/12/2025 (Originally 4/2/2024)    COLORECTAL CANCER SCREENING  08/31/2033    HEPATITIS C SCREENING  Completed    Pneumococcal Vaccine 65+  Completed    ZOSTER VACCINE  Completed    URINE MICROALBUMIN  Discontinued              Assessment & Plan     CMS Preventative Services Quick Reference  Risk Factors Identified During Encounter  Inactivity/Sedentary: Patient was advised to exercise at least 150 minutes a week per CDC recommendations.  The above risks/problems have been discussed with the patient.  Follow up actions/plans if indicated are seen below in the Assessment/Plan Section.  Pertinent information has been shared with the patient in the After Visit Summary.    Diagnoses and all orders for this visit:    1. Encounter for subsequent annual wellness visit (AWV) in Medicare patient [Z00.00 (ICD-10-CM)] (Primary)    2. Routine general medical examination at a health care facility  -     Comprehensive Metabolic Panel; Future  -     Lipid Panel; Future  -     TSH; Future  -     CBC (No Diff); Future    3. BPH with obstruction/lower urinary tract symptoms  -     PSA DIAGNOSTIC; Future    Preventive medicine discussed, diet, exercise, healthy living discussed at length.  Discussed nutrition, physical activity, healthy weight, injury prevention, misuse of tobacco, alcohol and drugs, dental health, mental health, immunizations, screening    Part of this note may be an electronic transcription/translation of spoken language to printed text using the Dragon Dictation System.     Assessment & Plan      Follow Up:  No  follow-ups on file.     An After Visit Summary and PPPS were given to the patient.    Preventive medicine discussed, diet, exercise, healthy living discussed at length.  Discussed nutrition, physical activity, healthy weight, injury prevention, misuse of tobacco, alcohol and drugs, dental health, mental health, immunizations, screening    Part of this note may be an electronic transcription/translation of spoken language to printed text using the Dragon Dictation System.          Patient or patient representative verbalized consent for the use of Ambient Listening during the visit with  WINSTON Flores for chart documentation. 11/12/2024  11:57 EST

## 2025-05-07 RX ORDER — BENAZEPRIL HYDROCHLORIDE 10 MG/1
10 TABLET ORAL DAILY
Qty: 90 TABLET | Refills: 3 | Status: SHIPPED | OUTPATIENT
Start: 2025-05-07

## 2025-08-18 RX ORDER — MECOBALAMIN 5000 MCG
15 TABLET,DISINTEGRATING ORAL DAILY
Qty: 90 CAPSULE | Refills: 3 | Status: SHIPPED | OUTPATIENT
Start: 2025-08-18

## (undated) DEVICE — SINGLE USE BIOPSY VALVE MAJ-210: Brand: SINGLE USE BIOPSY VALVE (STERILE)

## (undated) DEVICE — Device: Brand: SINGLE USE ASPIRATION NEEDLE NA-U401SX

## (undated) DEVICE — BOWL UTIL STRL 32OZ

## (undated) DEVICE — TRAP,MUCUS SPECIMEN,40CC: Brand: MEDLINE

## (undated) DEVICE — FRCP BX RADJAW4 PULM WO NDL STD1.8X2 100

## (undated) DEVICE — ST EXT MICROBORE FIX M LL 38IN

## (undated) DEVICE — SINGLE USE SUCTION VALVE MAJ-209: Brand: SINGLE USE SUCTION VALVE (STERILE)

## (undated) DEVICE — Device: Brand: BALLOON

## (undated) DEVICE — ST NDL BRONCH ASP VIZISHOT 2 FLX 19GA

## (undated) DEVICE — SOL IRR NACL 0.9PCT BT 1000ML

## (undated) DEVICE — LUER-LOK 360°: Brand: CONNECTA, LUER-LOK